# Patient Record
Sex: MALE | Race: WHITE | Employment: OTHER | URBAN - METROPOLITAN AREA
[De-identification: names, ages, dates, MRNs, and addresses within clinical notes are randomized per-mention and may not be internally consistent; named-entity substitution may affect disease eponyms.]

---

## 2023-01-09 ENCOUNTER — HOSPITAL ENCOUNTER (OUTPATIENT)
Age: 83
Setting detail: SPECIMEN
Discharge: HOME | End: 2023-01-09

## 2023-01-09 LAB
ALBUMIN SERPL-MCNC: 2.8 GM/DL (ref 3.4–5)
ALP BLD-CCNC: 138 IU/L (ref 40–129)
ALT SERPL-CCNC: 89 U/L (ref 10–40)
ANION GAP SERPL CALCULATED.3IONS-SCNC: 16 MMOL/L (ref 4–16)
AST SERPL-CCNC: 52 IU/L (ref 15–37)
BILIRUB SERPL-MCNC: 0.7 MG/DL (ref 0–1)
BILIRUBIN DIRECT: 0.2 MG/DL (ref 0–0.3)
BILIRUBIN, INDIRECT: 0.5 MG/DL (ref 0–0.7)
BUN BLDV-MCNC: 31 MG/DL (ref 6–23)
CALCIUM SERPL-MCNC: 8.7 MG/DL (ref 8.3–10.6)
CHLORIDE BLD-SCNC: 98 MMOL/L (ref 99–110)
CHOLESTEROL: 87 MG/DL
CO2: 20 MMOL/L (ref 21–32)
CREAT SERPL-MCNC: 1 MG/DL (ref 0.9–1.3)
ESTIMATED AVERAGE GLUCOSE: 146 MG/DL
GFR SERPL CREATININE-BSD FRML MDRD: >60 ML/MIN/1.73M2
GLUCOSE BLD-MCNC: 82 MG/DL (ref 70–99)
HBA1C MFR BLD: 6.7 % (ref 4.2–6.3)
HCT VFR BLD CALC: 41.3 % (ref 42–52)
HDLC SERPL-MCNC: 40 MG/DL
HEMOGLOBIN: 13 GM/DL (ref 13.5–18)
LDL CHOLESTEROL CALCULATED: 34 MG/DL
MCH RBC QN AUTO: 31.3 PG (ref 27–31)
MCHC RBC AUTO-ENTMCNC: 31.5 % (ref 32–36)
MCV RBC AUTO: 99.3 FL (ref 78–100)
PDW BLD-RTO: 13.7 % (ref 11.7–14.9)
PLATELET # BLD: 176 K/CU MM (ref 140–440)
PMV BLD AUTO: 10.1 FL (ref 7.5–11.1)
POTASSIUM SERPL-SCNC: 4.8 MMOL/L (ref 3.5–5.1)
RBC # BLD: 4.16 M/CU MM (ref 4.6–6.2)
SODIUM BLD-SCNC: 134 MMOL/L (ref 135–145)
TOTAL PROTEIN: 5.6 GM/DL (ref 6.4–8.2)
TRIGL SERPL-MCNC: 66 MG/DL
WBC # BLD: 4.7 K/CU MM (ref 4–10.5)

## 2023-01-09 PROCEDURE — 80061 LIPID PANEL: CPT

## 2023-01-09 PROCEDURE — 82248 BILIRUBIN DIRECT: CPT

## 2023-01-09 PROCEDURE — 83036 HEMOGLOBIN GLYCOSYLATED A1C: CPT

## 2023-01-09 PROCEDURE — 36415 COLL VENOUS BLD VENIPUNCTURE: CPT

## 2023-01-09 PROCEDURE — 80053 COMPREHEN METABOLIC PANEL: CPT

## 2023-01-09 PROCEDURE — 85027 COMPLETE CBC AUTOMATED: CPT

## 2023-01-11 ENCOUNTER — HOSPITAL ENCOUNTER (OUTPATIENT)
Age: 83
Setting detail: SPECIMEN
Discharge: HOME | End: 2023-01-11

## 2023-01-11 LAB
ALBUMIN SERPL-MCNC: 3.2 GM/DL (ref 3.4–5)
ALP BLD-CCNC: 137 IU/L (ref 40–129)
ALT SERPL-CCNC: 72 U/L (ref 10–40)
AST SERPL-CCNC: 39 IU/L (ref 15–37)
BILIRUB SERPL-MCNC: 0.7 MG/DL (ref 0–1)
BILIRUBIN DIRECT: 0.2 MG/DL (ref 0–0.3)
BILIRUBIN, INDIRECT: 0.5 MG/DL (ref 0–0.7)
GAMMA GLUTAMYL TRANSFERASE: 39 IU/L (ref 8–61)
TOTAL PROTEIN: 5.7 GM/DL (ref 6.4–8.2)

## 2023-01-11 PROCEDURE — 36415 COLL VENOUS BLD VENIPUNCTURE: CPT

## 2023-01-11 PROCEDURE — 80076 HEPATIC FUNCTION PANEL: CPT

## 2023-01-11 PROCEDURE — 82977 ASSAY OF GGT: CPT

## 2023-01-19 ENCOUNTER — HOSPITAL ENCOUNTER (OUTPATIENT)
Age: 83
Setting detail: SPECIMEN
Discharge: HOME | End: 2023-01-19
Payer: MEDICARE

## 2023-01-19 LAB
ALBUMIN SERPL-MCNC: 3.3 GM/DL (ref 3.4–5)
ALP BLD-CCNC: 124 IU/L (ref 40–129)
ALT SERPL-CCNC: 44 U/L (ref 10–40)
AST SERPL-CCNC: 36 IU/L (ref 15–37)
BILIRUB SERPL-MCNC: 0.6 MG/DL (ref 0–1)
BILIRUBIN DIRECT: 0.2 MG/DL (ref 0–0.3)
BILIRUBIN, INDIRECT: 0.4 MG/DL (ref 0–0.7)
TOTAL PROTEIN: 5.9 GM/DL (ref 6.4–8.2)

## 2023-01-19 PROCEDURE — 36415 COLL VENOUS BLD VENIPUNCTURE: CPT

## 2023-01-19 PROCEDURE — 80076 HEPATIC FUNCTION PANEL: CPT

## 2023-01-27 ENCOUNTER — APPOINTMENT (OUTPATIENT)
Dept: CT IMAGING | Age: 83
DRG: 689 | End: 2023-01-27
Payer: MEDICARE

## 2023-01-27 ENCOUNTER — APPOINTMENT (OUTPATIENT)
Dept: GENERAL RADIOLOGY | Age: 83
DRG: 689 | End: 2023-01-27
Payer: MEDICARE

## 2023-01-27 ENCOUNTER — HOSPITAL ENCOUNTER (INPATIENT)
Age: 83
LOS: 6 days | Discharge: NURSING, REHAB OR LONG-TERM CARE | DRG: 689 | End: 2023-02-02
Attending: INTERNAL MEDICINE
Payer: MEDICARE

## 2023-01-27 DIAGNOSIS — R53.1 GENERALIZED WEAKNESS: ICD-10-CM

## 2023-01-27 DIAGNOSIS — R31.9 URINARY TRACT INFECTION WITH HEMATURIA, SITE UNSPECIFIED: Primary | ICD-10-CM

## 2023-01-27 DIAGNOSIS — R33.9 URINARY RETENTION: ICD-10-CM

## 2023-01-27 DIAGNOSIS — N39.0 URINARY TRACT INFECTION WITH HEMATURIA, SITE UNSPECIFIED: Primary | ICD-10-CM

## 2023-01-27 DIAGNOSIS — S09.90XA CLOSED HEAD INJURY, INITIAL ENCOUNTER: ICD-10-CM
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
## 2023-01-27 LAB
ALBUMIN SERPL-MCNC: 3.4 GM/DL (ref 3.4–5)
ALP BLD-CCNC: 114 IU/L (ref 40–129)
ALT SERPL-CCNC: 39 U/L (ref 10–40)
AMMONIA: 25 UMOL/L (ref 16–60)
ANION GAP SERPL CALCULATED.3IONS-SCNC: 11 MMOL/L (ref 4–16)
AST SERPL-CCNC: 35 IU/L (ref 15–37)
BACTERIA: ABNORMAL /HPF
BASOPHILS ABSOLUTE: 0 K/CU MM
BASOPHILS RELATIVE PERCENT: 0.4 % (ref 0–1)
BILIRUB SERPL-MCNC: 0.6 MG/DL (ref 0–1)
BILIRUBIN URINE: NEGATIVE MG/DL
BLOOD, URINE: ABNORMAL
BUN BLDV-MCNC: 81 MG/DL (ref 6–23)
CALCIUM SERPL-MCNC: 9.1 MG/DL (ref 8.3–10.6)
CHLORIDE BLD-SCNC: 102 MMOL/L (ref 99–110)
CLARITY: CLEAR
CO2: 25 MMOL/L (ref 21–32)
COLOR: YELLOW
CREAT SERPL-MCNC: 1.4 MG/DL (ref 0.9–1.3)
DIFFERENTIAL TYPE: ABNORMAL
EKG ATRIAL RATE: 66 BPM
EKG DIAGNOSIS: NORMAL
EKG P AXIS: 74 DEGREES
EKG P-R INTERVAL: 228 MS
EKG Q-T INTERVAL: 494 MS
EKG QRS DURATION: 180 MS
EKG QTC CALCULATION (BAZETT): 517 MS
EKG R AXIS: -61 DEGREES
EKG T AXIS: 54 DEGREES
EKG VENTRICULAR RATE: 66 BPM
EOSINOPHILS ABSOLUTE: 0.2 K/CU MM
EOSINOPHILS RELATIVE PERCENT: 2.9 % (ref 0–3)
FOLATE: >20 NG/ML (ref 3.1–17.5)
GFR SERPL CREATININE-BSD FRML MDRD: 50 ML/MIN/1.73M2
GLUCOSE BLD-MCNC: 141 MG/DL
GLUCOSE BLD-MCNC: 141 MG/DL (ref 70–99)
GLUCOSE BLD-MCNC: 142 MG/DL (ref 70–99)
GLUCOSE, URINE: NEGATIVE MG/DL
HCT VFR BLD CALC: 41.6 % (ref 42–52)
HEMOGLOBIN: 13.3 GM/DL (ref 13.5–18)
IMMATURE NEUTROPHIL %: 0.7 % (ref 0–0.43)
KETONES, URINE: NEGATIVE MG/DL
LEUKOCYTE ESTERASE, URINE: ABNORMAL
LYMPHOCYTES ABSOLUTE: 1.2 K/CU MM
LYMPHOCYTES RELATIVE PERCENT: 17.8 % (ref 24–44)
MCH RBC QN AUTO: 31.4 PG (ref 27–31)
MCHC RBC AUTO-ENTMCNC: 32 % (ref 32–36)
MCV RBC AUTO: 98.3 FL (ref 78–100)
MONOCYTES ABSOLUTE: 0.9 K/CU MM
MONOCYTES RELATIVE PERCENT: 12.3 % (ref 0–4)
NITRITE URINE, QUANTITATIVE: POSITIVE
NUCLEATED RBC %: 0 %
PDW BLD-RTO: 15.1 % (ref 11.7–14.9)
PH, URINE: 7 (ref 5–8)
PLATELET # BLD: 156 K/CU MM (ref 140–440)
PMV BLD AUTO: 10.2 FL (ref 7.5–11.1)
POTASSIUM SERPL-SCNC: 4.5 MMOL/L (ref 3.5–5.1)
PROTEIN UA: NEGATIVE MG/DL
RBC # BLD: 4.23 M/CU MM (ref 4.6–6.2)
RBC URINE: 6 /HPF (ref 0–3)
SEGMENTED NEUTROPHILS ABSOLUTE COUNT: 4.6 K/CU MM
SEGMENTED NEUTROPHILS RELATIVE PERCENT: 65.9 % (ref 36–66)
SODIUM BLD-SCNC: 138 MMOL/L (ref 135–145)
SPECIFIC GRAVITY UA: <1.005 (ref 1–1.03)
TOTAL CK: 36 IU/L (ref 38–174)
TOTAL IMMATURE NEUTOROPHIL: 0.05 K/CU MM
TOTAL NUCLEATED RBC: 0 K/CU MM
TOTAL PROTEIN: 6.3 GM/DL (ref 6.4–8.2)
TRICHOMONAS: ABNORMAL /HPF
TROPONIN T: <0.01 NG/ML
TSH HIGH SENSITIVITY: 0.56 UIU/ML (ref 0.27–4.2)
UROBILINOGEN, URINE: 0.2 MG/DL (ref 0.2–1)
VITAMIN B-12: 1235 PG/ML (ref 211–911)
WBC # BLD: 6.9 K/CU MM (ref 4–10.5)
WBC CLUMP: ABNORMAL /HPF
WBC UA: 9 /HPF (ref 0–2)

## 2023-01-27 PROCEDURE — 6360000002 HC RX W HCPCS: Performed by: PHYSICIAN ASSISTANT

## 2023-01-27 PROCEDURE — 2060000000 HC ICU INTERMEDIATE R&B

## 2023-01-27 PROCEDURE — 6370000000 HC RX 637 (ALT 250 FOR IP): Performed by: NURSE PRACTITIONER

## 2023-01-27 PROCEDURE — 93005 ELECTROCARDIOGRAM TRACING: CPT | Performed by: PHYSICIAN ASSISTANT

## 2023-01-27 PROCEDURE — 84484 ASSAY OF TROPONIN QUANT: CPT

## 2023-01-27 PROCEDURE — 72125 CT NECK SPINE W/O DYE: CPT

## 2023-01-27 PROCEDURE — 51702 INSERT TEMP BLADDER CATH: CPT

## 2023-01-27 PROCEDURE — 82607 VITAMIN B-12: CPT

## 2023-01-27 PROCEDURE — 2580000003 HC RX 258: Performed by: PHYSICIAN ASSISTANT

## 2023-01-27 PROCEDURE — 87040 BLOOD CULTURE FOR BACTERIA: CPT

## 2023-01-27 PROCEDURE — 81003 URINALYSIS AUTO W/O SCOPE: CPT

## 2023-01-27 PROCEDURE — 82962 GLUCOSE BLOOD TEST: CPT

## 2023-01-27 PROCEDURE — 74176 CT ABD & PELVIS W/O CONTRAST: CPT

## 2023-01-27 PROCEDURE — 82746 ASSAY OF FOLIC ACID SERUM: CPT

## 2023-01-27 PROCEDURE — 72170 X-RAY EXAM OF PELVIS: CPT

## 2023-01-27 PROCEDURE — 99285 EMERGENCY DEPT VISIT HI MDM: CPT

## 2023-01-27 PROCEDURE — 96365 THER/PROPH/DIAG IV INF INIT: CPT

## 2023-01-27 PROCEDURE — 96361 HYDRATE IV INFUSION ADD-ON: CPT

## 2023-01-27 PROCEDURE — 80053 COMPREHEN METABOLIC PANEL: CPT

## 2023-01-27 PROCEDURE — 93010 ELECTROCARDIOGRAM REPORT: CPT | Performed by: INTERNAL MEDICINE

## 2023-01-27 PROCEDURE — 51798 US URINE CAPACITY MEASURE: CPT

## 2023-01-27 PROCEDURE — 85025 COMPLETE CBC W/AUTO DIFF WBC: CPT

## 2023-01-27 PROCEDURE — 71045 X-RAY EXAM CHEST 1 VIEW: CPT

## 2023-01-27 PROCEDURE — 36415 COLL VENOUS BLD VENIPUNCTURE: CPT

## 2023-01-27 PROCEDURE — 82550 ASSAY OF CK (CPK): CPT

## 2023-01-27 PROCEDURE — 82140 ASSAY OF AMMONIA: CPT

## 2023-01-27 PROCEDURE — 70450 CT HEAD/BRAIN W/O DYE: CPT

## 2023-01-27 PROCEDURE — 2580000003 HC RX 258: Performed by: NURSE PRACTITIONER

## 2023-01-27 PROCEDURE — 84443 ASSAY THYROID STIM HORMONE: CPT

## 2023-01-27 RX ORDER — ATORVASTATIN CALCIUM 40 MG/1
40 TABLET, FILM COATED ORAL NIGHTLY
COMMUNITY

## 2023-01-27 RX ORDER — NITROGLYCERIN 0.4 MG/1
0.4 TABLET SUBLINGUAL EVERY 5 MIN PRN
COMMUNITY

## 2023-01-27 RX ORDER — DEXTROSE MONOHYDRATE 100 MG/ML
INJECTION, SOLUTION INTRAVENOUS CONTINUOUS PRN
Status: DISCONTINUED | OUTPATIENT
Start: 2023-01-27 | End: 2023-02-02 | Stop reason: HOSPADM

## 2023-01-27 RX ORDER — DOCUSATE SODIUM 100 MG/1
100 CAPSULE, LIQUID FILLED ORAL DAILY
Status: DISCONTINUED | OUTPATIENT
Start: 2023-01-27 | End: 2023-01-28

## 2023-01-27 RX ORDER — UREA 15 G
30 POWDER IN PACKET (EA) ORAL 2 TIMES DAILY
Status: ON HOLD | COMMUNITY
End: 2023-02-02 | Stop reason: HOSPADM

## 2023-01-27 RX ORDER — BACITRACIN, NEOMYCIN, POLYMYXIN B 400; 3.5; 5 [USP'U]/G; MG/G; [USP'U]/G
OINTMENT TOPICAL 2 TIMES DAILY
Status: DISCONTINUED | OUTPATIENT
Start: 2023-01-27 | End: 2023-02-02 | Stop reason: HOSPADM

## 2023-01-27 RX ORDER — SODIUM CHLORIDE 9 MG/ML
INJECTION, SOLUTION INTRAVENOUS CONTINUOUS
Status: DISCONTINUED | OUTPATIENT
Start: 2023-01-27 | End: 2023-01-28

## 2023-01-27 RX ORDER — POLYETHYLENE GLYCOL 3350 17 G/17G
17 POWDER, FOR SOLUTION ORAL 2 TIMES DAILY PRN
COMMUNITY

## 2023-01-27 RX ORDER — ATORVASTATIN CALCIUM 40 MG/1
40 TABLET, FILM COATED ORAL NIGHTLY
Status: DISCONTINUED | OUTPATIENT
Start: 2023-01-27 | End: 2023-02-02 | Stop reason: HOSPADM

## 2023-01-27 RX ORDER — 0.9 % SODIUM CHLORIDE 0.9 %
1000 INTRAVENOUS SOLUTION INTRAVENOUS ONCE
Status: COMPLETED | OUTPATIENT
Start: 2023-01-27 | End: 2023-01-27

## 2023-01-27 RX ORDER — ONDANSETRON 2 MG/ML
4 INJECTION INTRAMUSCULAR; INTRAVENOUS EVERY 6 HOURS PRN
Status: DISCONTINUED | OUTPATIENT
Start: 2023-01-27 | End: 2023-02-02 | Stop reason: HOSPADM

## 2023-01-27 RX ORDER — LISINOPRIL 2.5 MG/1
2.5 TABLET ORAL NIGHTLY
Status: ON HOLD | COMMUNITY
End: 2023-02-02 | Stop reason: HOSPADM

## 2023-01-27 RX ORDER — ACETAMINOPHEN 650 MG/1
650 SUPPOSITORY RECTAL EVERY 6 HOURS PRN
Status: DISCONTINUED | OUTPATIENT
Start: 2023-01-27 | End: 2023-02-02 | Stop reason: HOSPADM

## 2023-01-27 RX ORDER — OMEPRAZOLE 20 MG/1
20 CAPSULE, DELAYED RELEASE ORAL DAILY
COMMUNITY

## 2023-01-27 RX ORDER — SODIUM CHLORIDE 0.9 % (FLUSH) 0.9 %
5-40 SYRINGE (ML) INJECTION EVERY 12 HOURS SCHEDULED
Status: DISCONTINUED | OUTPATIENT
Start: 2023-01-27 | End: 2023-02-02 | Stop reason: HOSPADM

## 2023-01-27 RX ORDER — SODIUM CHLORIDE 0.9 % (FLUSH) 0.9 %
5-40 SYRINGE (ML) INJECTION PRN
Status: DISCONTINUED | OUTPATIENT
Start: 2023-01-27 | End: 2023-02-02 | Stop reason: HOSPADM

## 2023-01-27 RX ORDER — TRAZODONE HYDROCHLORIDE 50 MG/1
50 TABLET ORAL DAILY
Status: ON HOLD | COMMUNITY
End: 2023-02-02 | Stop reason: HOSPADM

## 2023-01-27 RX ORDER — VITAMIN B COMPLEX
1 CAPSULE ORAL DAILY
COMMUNITY

## 2023-01-27 RX ORDER — PANTOPRAZOLE SODIUM 40 MG/1
40 TABLET, DELAYED RELEASE ORAL
Status: DISCONTINUED | OUTPATIENT
Start: 2023-01-28 | End: 2023-02-02 | Stop reason: HOSPADM

## 2023-01-27 RX ORDER — MIRTAZAPINE 7.5 MG/1
7.5 TABLET, FILM COATED ORAL NIGHTLY
Status: ON HOLD | COMMUNITY
End: 2023-02-02 | Stop reason: HOSPADM

## 2023-01-27 RX ORDER — DOCUSATE SODIUM 100 MG/1
100 CAPSULE, LIQUID FILLED ORAL DAILY
COMMUNITY

## 2023-01-27 RX ORDER — POLYETHYLENE GLYCOL 3350 17 G/17G
17 POWDER, FOR SOLUTION ORAL DAILY PRN
Status: DISCONTINUED | OUTPATIENT
Start: 2023-01-27 | End: 2023-02-02 | Stop reason: HOSPADM

## 2023-01-27 RX ORDER — MIRTAZAPINE 15 MG/1
7.5 TABLET, FILM COATED ORAL NIGHTLY
Status: DISCONTINUED | OUTPATIENT
Start: 2023-01-27 | End: 2023-02-02 | Stop reason: HOSPADM

## 2023-01-27 RX ORDER — CHLORAL HYDRATE 500 MG
1000 CAPSULE ORAL DAILY
COMMUNITY

## 2023-01-27 RX ORDER — SODIUM CHLORIDE 9 MG/ML
INJECTION, SOLUTION INTRAVENOUS PRN
Status: DISCONTINUED | OUTPATIENT
Start: 2023-01-27 | End: 2023-02-02 | Stop reason: HOSPADM

## 2023-01-27 RX ORDER — ONDANSETRON 4 MG/1
4 TABLET, ORALLY DISINTEGRATING ORAL EVERY 8 HOURS PRN
Status: DISCONTINUED | OUTPATIENT
Start: 2023-01-27 | End: 2023-02-02 | Stop reason: HOSPADM

## 2023-01-27 RX ORDER — TAMSULOSIN HYDROCHLORIDE 0.4 MG/1
0.4 CAPSULE ORAL NIGHTLY
COMMUNITY

## 2023-01-27 RX ORDER — TRAZODONE HYDROCHLORIDE 50 MG/1
50 TABLET ORAL DAILY
Status: DISCONTINUED | OUTPATIENT
Start: 2023-01-27 | End: 2023-02-02 | Stop reason: HOSPADM

## 2023-01-27 RX ORDER — TAMSULOSIN HYDROCHLORIDE 0.4 MG/1
0.4 CAPSULE ORAL NIGHTLY
Status: DISCONTINUED | OUTPATIENT
Start: 2023-01-27 | End: 2023-02-02 | Stop reason: HOSPADM

## 2023-01-27 RX ORDER — ACETAMINOPHEN 325 MG/1
650 TABLET ORAL EVERY 6 HOURS PRN
Status: DISCONTINUED | OUTPATIENT
Start: 2023-01-27 | End: 2023-02-02 | Stop reason: HOSPADM

## 2023-01-27 RX ADMIN — MIRTAZAPINE 7.5 MG: 15 TABLET, FILM COATED ORAL at 21:15

## 2023-01-27 RX ADMIN — TRAZODONE HYDROCHLORIDE 50 MG: 50 TABLET ORAL at 21:15

## 2023-01-27 RX ADMIN — SODIUM CHLORIDE: 9 INJECTION, SOLUTION INTRAVENOUS at 21:23

## 2023-01-27 RX ADMIN — DOCUSATE SODIUM 100 MG: 100 CAPSULE, LIQUID FILLED ORAL at 21:15

## 2023-01-27 RX ADMIN — SODIUM CHLORIDE 1000 ML: 9 INJECTION, SOLUTION INTRAVENOUS at 11:20

## 2023-01-27 RX ADMIN — TAMSULOSIN HYDROCHLORIDE 0.4 MG: 0.4 CAPSULE ORAL at 21:15

## 2023-01-27 RX ADMIN — CEFTRIAXONE SODIUM 1000 MG: 1 INJECTION, POWDER, FOR SOLUTION INTRAMUSCULAR; INTRAVENOUS at 12:10

## 2023-01-27 RX ADMIN — ATORVASTATIN CALCIUM 40 MG: 40 TABLET, FILM COATED ORAL at 21:15

## 2023-01-27 RX ADMIN — Medication 10 ML: at 21:00

## 2023-01-27 NOTE — ED NOTES
Medication History  [facility]    Patient Name: Viki Barbour 1940     Medication history has been completed by: Bernard Merritt CPhT    Source(s) of information: [facility] provided by [facility]     Primary Care Physician: Kia Zuniga:     Allergies as of 01/27/2023    (No Known Allergies)        Prior to Admission medications    Medication Sig Start Date End Date Taking?  Authorizing Provider   Probiotic Product (ACIDOPHILUS PEARLS PO) Take 1 capsule by mouth daily   Yes Historical Provider, MD   atorvastatin (LIPITOR) 40 MG tablet Take 40 mg by mouth nightly   Yes Historical Provider, MD   docusate sodium (COLACE) 100 MG capsule Take 100 mg by mouth daily   Yes Historical Provider, MD   apixaban (ELIQUIS) 5 MG TABS tablet Take 5 mg by mouth 2 times daily   Yes Historical Provider, MD   Multiple Vitamins-Minerals (EYE MULTIVITAMIN/SODIUM PO) Take 1 tablet by mouth 2 times daily   Yes Historical Provider, MD   Omega-3 Fatty Acids (FISH OIL) 1000 MG capsule Take 1,000 mg by mouth daily   Yes Historical Provider, MD   lisinopril (PRINIVIL;ZESTRIL) 2.5 MG tablet Take 2.5 mg by mouth nightly   Yes Historical Provider, MD   metFORMIN (GLUCOPHAGE) 500 MG tablet Take 500 mg by mouth 2 times daily (with meals)   Yes Historical Provider, MD   mirtazapine (REMERON) 7.5 MG tablet Take 7.5 mg by mouth nightly   Yes Historical Provider, MD   omeprazole (PRILOSEC) 20 MG delayed release capsule Take 20 mg by mouth daily   Yes Historical Provider, MD   Calcium Carb-Cholecalciferol (OYSTER SHELL CALCIUM + D PO) Take 1 tablet by mouth daily Receives 500/200 mg tablets   Yes Historical Provider, MD   tamsulosin (FLOMAX) 0.4 MG capsule Take 0.4 mg by mouth nightly   Yes Historical Provider, MD   traZODone (DESYREL) 50 MG tablet Take 50 mg by mouth daily   Yes Historical Provider, MD   urea (URE-NA) 15 g PACK packet Take 30 g by mouth 2 times daily   Yes Historical Provider, MD b complex vitamins capsule Take 1 capsule by mouth daily   Yes Historical Provider, MD   nitroGLYCERIN (NITROSTAT) 0.4 MG SL tablet Place 0.4 mg under the tongue every 5 minutes as needed for Chest pain up to max of 3 total doses. If no relief after 1 dose, call 911. Yes Historical Provider, MD   polyethylene glycol (GLYCOLAX) 17 g packet Take 17 g by mouth 2 times daily as needed for Constipation   Yes Historical Provider, MD     Medications added or changed (ex.  new medication, dosage change, interval change, formulation change):  See medication list as stated above    Comments:  [facility]     To my knowledge the above medication history is accurate as of 1/27/2023 1:17 PM.   Nabila Kline CPhT   1/27/2023 1:17 PM

## 2023-01-27 NOTE — ED NOTES
Patient wanted lower dentures out. Patient took them out and I placed them in a denture cup with lid. Placed denture cup on patient's beside table.

## 2023-01-27 NOTE — ED NOTES
Pt tried to provide urine sample but states he has a hard time using urinals. Pt was straight cathed and tolerated well.       Marcial Arciniega, RN  01/27/23 8226

## 2023-01-27 NOTE — H&P
History and Physical      Name:  Lou Jones /Age/Sex: 1940  (80 y.o. male)   MRN & CSN:  [id number] & [id number] Admission Date/Time: 2023  8:37 AM   Location:  ED27/ED-27 PCP: Enrike Ni Day: 1           Assessment and Plan:   Lou Jones is a 80 y.o. male  with history of CAD status post CABG, CVA, SAH, persistent atrial fibrillation status post cardioversion on AC, essential hypertension, diabetes mellitus, BEBETO on CPAP, former tobacco abuse who presents from [facility] for confusion and a fall. # Acute encephalopathy in setting of UTI/Hx Dementia -CT head nonacute. Patient appears to be at his baseline mental status able to state name place and president, reorients to a year otherwise no acute focal deficits on exam.  -Continue neurochecks overnight, will check TSH folate B12, ammonia, continue treatment of UTI with IV Ceftriaxone, recent history of Klebsiella UTI which was sensitive to cephalosporins, f/u urine and bcx's and adj abx per sensitivity. # Hypotension -improving, possibly due to fluid volume status,systolic BP 15X initially, improved to low 100s after 1 L IV fluids given in ED. IV hydration, hold current antihypertensives until BP more stable    # Acute kidney injury with acute urinary retention/hx BPH -urinary retention and hypotension contributing to JOSH. Patient was unable to void in ED, straight cath was unsuccessful, bladder scan 600 mL Wall cath placement in ED.   -We will hold patient's ACE inhibitor and metformin. Avoid nephrotoxins, NSAIDs, IV contrast studies, renal dose medications for current GFR, continue IV hydration x1 L monitor need for further IV hydration, keep MAP greater than 65 we will continue patient's Flomax, recommend voiding trial in 1 to 2 days, possibly will need urology eval.     # Head abrasion s/p recent fall -patient had mechanical fall last evening at the [facility] living community.   The patient apparently rolled out of bed. CT head cervical spine abdomen pelvis were nonacute performed in ED today.   -local wound care, fall precautions, PT/OT    # CAD status post CABG 2012-patient denies chest pain. EKG showing RBBBTroponin negative x1. Patient managed on statin and ACE inhibitor not on antiplatelets or beta-blocker, monitor on telemetry    # Persistent atrial fibrillation status post cardioversion on [facility] -patient managed on Eliquis, not on rate control medications, heart rate currently controlled, telemetry monitoring    Other chronic medical conditions resume home medications unless contraindicated   # Hx CVA/SAH -reportedly no residual deficits. On statin  # Essential hypertension-holding antihypertensives until BP more stable and renal function stable  # Diabetes mellitus type 2-hold metformin, monitor blood glucose before meals and at bedtime, manage with corrective insulin, monitor need for basal insulin, ADA diet  # BEBETO on CPAP-resume CPAP per home settings  # Mixed hyperlipidemia on statin  # Hx  Dementia - managed on Remeron  # Chronic anemia - Hgb stable, monitor    Present on Admission:   Urinary tract infection             Diet No diet orders on file   DVT Prophylaxis [] Lovenox, []  Heparin, [] SCDs, [] Ambulation  [x] Long term AC   GI Prophylaxis [x] PPI,  [] H2 Blocker,  [] Carafate,  [] Diet/Tube Feeds   Code Status Full code    Disposition Admit to .     Patient plans to return home upon discharge  Expected length of stay 2 days       -Patient assessment and plan discussed and reviewed with admitting physician: Karyna Fernandez MD.       Chief Complaint: Fall      History obtained from EHR and patient and Masonic Supervisor   History of Present Illness:   Shereen Ryder is a 80 y.o. male  with history of CAD status post CABG, CVA, SAH, persistent atrial fibrillation status post cardioversion on AC, essential hypertension, diabetes mellitus, BEBETO on CPAP, former tobacco abuse who presents from [address] assisted living for confusion and a fall. The patient had been in his usual state of health until last evening he had a reported mechanical fall sustaining a head abrasion. This morning he was noted to have increased confusion from his baseline, low blood pressure and difficulty ambulating therefore he was sent to ED for evaluation. Per nursing supervisor no reports of recent infections fever chills or cough. He had been eating and drinking well. The patient denies chest pain or shortness of breath. He reports he had some lower abdominal discomfort which improved once Wall cath was placed in ED. He presented to the emergency department afebrile pulse 67 respiration 16 blood pressure 92/64 O2 sat 97% on room air. He had systolic BP in the 30W which improved with 1 L of normal saline given in ED. CT of the head cervical spine performed was nonacute. CT of the abdomen pelvis showed no acute pathology. He he was unable to void in ED and was bladder scanned for greater than 600 mL, therefore a Wall cath was placed. Urinalysis was positive for UTI. Chemistry panel showed a BUN 81 creatinine 1.4 GFR 50 glucose 142 otherwise unremarkable. CK 36. EKG showed sinus rhythm with right bundle branch block, troponin negative x1. LFTs unremarkable. WBC 6.9, hemoglobin 13.3, hematocrit 41.6, platelets 136. The patient was given IV ceftriaxone blood cultures x2 obtained. He has been admitted for further management. Ten point ROS: reviewed and are negative, unless as noted in above HPI. Objective:   No intake or output data in the 24 hours ending 01/27/23 1605     Vitals:   Vitals:    01/27/23 1326 01/27/23 1329 01/27/23 1529 01/27/23 1532   BP: (!) 100/54 (!) 100/54  106/64   Pulse: 68 66 62 61   Resp: 25 21 23 21   Temp:       TempSrc:       SpO2: 97% 96% 100% 94%   Weight:       Height:           Physical Exam: 01/27/23     GEN -Awake  appearing male, in NAD.   Appears given age.  EYES -anicteric, conjunctiva pink. HENT -right forehead abrasion . MM are moist. No evidence of thrush. NECK -Supple, no apparent thyromegaly or masses. RESP -CTA, no wheezes, rales or rhonchi. Symmetric chest movement   C/V -S1/S2 auscultated. RRR without appreciable M/R/G. No JVD. Cap refill <3 sec. trace edema. GI -Abdomen is soft non distended, non tender to palpation. + BS. No masses or guarding.  -No CVA/ flank tenderness. Wall cath with liberty urine present  LYMPH- No petechiae or ecchymoses. MS -No gross joint deformities. SKIN -scattered petechiae noted in the lower extremities normal coloration, warm, dry. NEURO-Cranial nerves appear grossly intact, normal speech, no lateralizing weakness. PSYC-Awake, alert, oriented to self and place reorients to time  . Appropriate affect. Past Medical History:      Past Medical History:   Diagnosis Date    Atrial fibrillation ([facility] [address]     Cerebral artery occlusion with cerebral infarction ([facility] [address]     Diabetes mellitus ([facility] [address]     Hyperlipidemia     Hypertension    CAD, BPH  BEBETO on CPAP    PMH reviewed  Past Surgical  History:    has no past surgical history on file. CABG  Surgical history reviewed  Family  History:   family history is not on file. Father -, Mother -     Family history reviewed  Social History:     Social History     Socioeconomic History    Marital status:      Spouse name: None    Number of children: None    Years of education: None    Highest education level: None   Tobacco Use    Smoking status: Former     Types: Cigarettes    Smokeless tobacco: Former   Substance and Sexual Activity    Alcohol use: Not Currently    Drug use: Never    Sexual activity: Not Currently      reports that he has quit smoking. His smoking use included cigarettes. He has quit using smokeless tobacco.   reports that he does not currently use alcohol. reports no history of drug use.     Social history reviewed  Allergies:   No Known Allergies    Home Medications:     Prior to Admission medications    Medication Sig Start Date End Date Taking? Authorizing Provider   Probiotic Product (ACIDOPHILUS PEARLS PO) Take 1 capsule by mouth daily   Yes Historical Provider, MD   atorvastatin (LIPITOR) 40 MG tablet Take 40 mg by mouth nightly   Yes Historical Provider, MD   docusate sodium (COLACE) 100 MG capsule Take 100 mg by mouth daily   Yes Historical Provider, MD   apixaban (ELIQUIS) 5 MG TABS tablet Take 5 mg by mouth 2 times daily   Yes Historical Provider, MD   Multiple Vitamins-Minerals (EYE MULTIVITAMIN/SODIUM PO) Take 1 tablet by mouth 2 times daily   Yes Historical Provider, MD   Omega-3 Fatty Acids (FISH OIL) 1000 MG capsule Take 1,000 mg by mouth daily   Yes Historical Provider, MD   lisinopril (PRINIVIL;ZESTRIL) 2.5 MG tablet Take 2.5 mg by mouth nightly   Yes Historical Provider, MD   metFORMIN (GLUCOPHAGE) 500 MG tablet Take 500 mg by mouth 2 times daily (with meals)   Yes Historical Provider, MD   mirtazapine (REMERON) 7.5 MG tablet Take 7.5 mg by mouth nightly   Yes Historical Provider, MD   omeprazole (PRILOSEC) 20 MG delayed release capsule Take 20 mg by mouth daily   Yes Historical Provider, MD   Calcium Carb-Cholecalciferol (OYSTER SHELL CALCIUM + D PO) Take 1 tablet by mouth daily Receives 500/200 mg tablets   Yes Historical Provider, MD   tamsulosin (FLOMAX) 0.4 MG capsule Take 0.4 mg by mouth nightly   Yes Historical Provider, MD   traZODone (DESYREL) 50 MG tablet Take 50 mg by mouth daily   Yes Historical Provider, MD   urea (URE-NA) 15 g PACK packet Take 30 g by mouth 2 times daily   Yes Historical Provider, MD   b complex vitamins capsule Take 1 capsule by mouth daily   Yes Historical Provider, MD   nitroGLYCERIN (NITROSTAT) 0.4 MG SL tablet Place 0.4 mg under the tongue every 5 minutes as needed for Chest pain up to max of 3 total doses. If no relief after 1 dose, call 911.    Yes Historical Provider, MD   polyethylene glycol (GLYCOLAX) 17 g packet Take 17 g by mouth 2 times daily as needed for Constipation   Yes Historical Provider, MD         Medications:   Medications:    Infusions:   PRN Meds:     Data:     Laboratory this visit:  Reviewed  Recent Labs     01/27/23  0847   WBC 6.9   HGB 13.3*   HCT 41.6*         Recent Labs     01/27/23  0847      K 4.5      CO2 25   BUN 81*   CREATININE 1.4*     Recent Labs     01/27/23  0847   AST 35   ALT 39   BILITOT 0.6   ALKPHOS 114     No results for input(s): INR in the last 72 hours. Radiology this visit:  Reviewed. CT ABDOMEN PELVIS WO CONTRAST Additional Contrast? None    Result Date: 1/27/2023  EXAMINATION: CT OF THE ABDOMEN AND PELVIS WITHOUT CONTRAST 1/27/2023 1:58 pm TECHNIQUE: CT of the abdomen and pelvis was performed without the administration of intravenous contrast. Multiplanar reformatted images are provided for review. Automated exposure control, iterative reconstruction, and/or weight based adjustment of the mA/kV was utilized to reduce the radiation dose to as low as reasonably achievable. COMPARISON: None. HISTORY: ORDERING SYSTEM PROVIDED HISTORY: JOSH, UTI, confusion; fell of SNF bed last night TECHNOLOGIST PROVIDED HISTORY: Reason for exam:->JOSH, UTI, confusion; fell of SNF bed last night Additional Contrast?->None Decision Support Exception - unselect if not a suspected or confirmed emergency medical condition->Emergency Medical Condition (MA) Reason for Exam: JOSH, UTI, confusion; fell of SNF bed last night Relevant Medical/Surgical History: none FINDINGS: Lower Chest: Lung bases are clear. Coronary artery disease is appreciated. Organs: The solid organs are limited in evaluation due to lack of use of intravenous contrast. Cholelithiasis is identified. Multiple bilateral renal cysts are identified more numerous and larger on the right. No hydronephrosis is identified. GI/Bowel: The bowel is normal in caliber. No free air is identified. The appendix is normal.  Mild diverticulosis is identified without evidence of acute diverticulitis. Pelvis: The urinary bladder is intact. The prostate gland is enlarged and measures 6.5 cm in width. Peritoneum/Retroperitoneum: No lymphadenopathy is appreciated. Moderate aortic atherosclerosis is identified. Bones/Soft Tissues: Right total hip arthroplasty appears uncomplicated. Osteopenia is noted. No acute osseous abnormality is identified. No acute abnormality within the abdomen or pelvis. Numerous bilateral renal cysts without evidence for hydronephrosis. Osteopenia. No gross acute fracture. XR PELVIS (1-2 VIEWS)    Result Date: 1/27/2023  EXAMINATION: ONE XRAY VIEW OF THE PELVIS 1/27/2023 9:17 am COMPARISON: None. HISTORY: ORDERING SYSTEM PROVIDED HISTORY: fell out of bed; remote h/o of right hip fracture TECHNOLOGIST PROVIDED HISTORY: Reason for exam:->fell out of bed; remote h/o of right hip fracture Reason for Exam: fell out of bed; remote h/o of right hip fracture FINDINGS: The bones are osteopenic. There is a right hip arthroplasty. No acute fractures or dislocations are seen. There is no diastases of the pubic symphysis or sacroiliac joints. There are degenerative changes involving the left hip. 1. No acute abnormality involving the pelvis. CT HEAD WO CONTRAST    Result Date: 1/27/2023  EXAMINATION: CT OF THE HEAD WITHOUT CONTRAST; CT OF THE CERVICAL SPINE WITHOUT CONTRAST 1/27/2023 9:29 am TECHNIQUE: CT of the head was performed without the administration of intravenous contrast. Automated exposure control, iterative reconstruction, and/or weight based adjustment of the mA/kV was utilized to reduce the radiation dose to as low as reasonably achievable.; CT of the cervical spine was performed without the administration of intravenous contrast. Multiplanar reformatted images are provided for review.  Automated exposure control, iterative reconstruction, and/or weight based adjustment of the mA/kV was utilized to reduce the radiation dose to as low as reasonably achievable. COMPARISON: None. HISTORY: ORDERING SYSTEM PROVIDED HISTORY: fell out of bed; head injury/scalp hematoma; possible ams, generalized  weakness TECHNOLOGIST PROVIDED HISTORY: Reason for exam:->fell out of bed; head injury/scalp hematoma; possible ams, generalized  weakness Has a \"code stroke\" or \"stroke alert\" been called? ->No Decision Support Exception - unselect if not a suspected or confirmed emergency medical condition->Emergency Medical Condition (MA) Reason for Exam: fell out of bed; head injury/scalp hematoma; possible ams, generalized  weakness Additional signs and symptoms: unknown Relevant Medical/Surgical History: poor historian FINDINGS: CT SCAN HEAD: BRAIN/VENTRICLES: There is no acute intracranial hemorrhage, mass effect or midline shift. No abnormal extra-axial fluid collection. The gray-white differentiation is maintained without evidence of an acute infarct. There is no evidence of hydrocephalus. The cerebral sulci and ventricles are enlarged compatible with diffuse cerebral atrophy. There is low-attenuation in the periventricular white matter and deep white matter of the brain representing changes of chronic small vessel ischemic disease. ORBITS: The visualized portion of the orbits demonstrate no acute abnormality. SINUSES: The visualized paranasal sinuses and mastoid air cells demonstrate no acute abnormality. There is mucosal disease involving the maxillary and ethmoid sinuses. SOFT TISSUES/SKULL:  No acute abnormality involving the visualized skull or soft tissues. CT SCAN CERVICAL SPINE: BONES/ALIGNMENT: The alignment of the cervical spine is within normal limits. No acute fractures or dislocations are seen. DEGENERATIVE CHANGES: There is multilevel degenerative disease of the cervical spine. SOFT TISSUES: There is no prevertebral soft tissue swelling.      1. No acute intracranial abnormality. 2. No acute traumatic abnormality involving the cervical spine. CT CERVICAL SPINE WO CONTRAST    Result Date: 1/27/2023  EXAMINATION: CT OF THE HEAD WITHOUT CONTRAST; CT OF THE CERVICAL SPINE WITHOUT CONTRAST 1/27/2023 9:29 am TECHNIQUE: CT of the head was performed without the administration of intravenous contrast. Automated exposure control, iterative reconstruction, and/or weight based adjustment of the mA/kV was utilized to reduce the radiation dose to as low as reasonably achievable.; CT of the cervical spine was performed without the administration of intravenous contrast. Multiplanar reformatted images are provided for review. Automated exposure control, iterative reconstruction, and/or weight based adjustment of the mA/kV was utilized to reduce the radiation dose to as low as reasonably achievable. COMPARISON: None. HISTORY: ORDERING SYSTEM PROVIDED HISTORY: fell out of bed; head injury/scalp hematoma; possible ams, generalized  weakness TECHNOLOGIST PROVIDED HISTORY: Reason for exam:->fell out of bed; head injury/scalp hematoma; possible ams, generalized  weakness Has a \"code stroke\" or \"stroke alert\" been called? ->No Decision Support Exception - unselect if not a suspected or confirmed emergency medical condition->Emergency Medical Condition (MA) Reason for Exam: fell out of bed; head injury/scalp hematoma; possible ams, generalized  weakness Additional signs and symptoms: unknown Relevant Medical/Surgical History: poor historian FINDINGS: CT SCAN HEAD: BRAIN/VENTRICLES: There is no acute intracranial hemorrhage, mass effect or midline shift. No abnormal extra-axial fluid collection. The gray-white differentiation is maintained without evidence of an acute infarct. There is no evidence of hydrocephalus. The cerebral sulci and ventricles are enlarged compatible with diffuse cerebral atrophy.  There is low-attenuation in the periventricular white matter and deep white matter of the brain representing changes of chronic small vessel ischemic disease. ORBITS: The visualized portion of the orbits demonstrate no acute abnormality. SINUSES: The visualized paranasal sinuses and mastoid air cells demonstrate no acute abnormality. There is mucosal disease involving the maxillary and ethmoid sinuses. SOFT TISSUES/SKULL:  No acute abnormality involving the visualized skull or soft tissues. CT SCAN CERVICAL SPINE: BONES/ALIGNMENT: The alignment of the cervical spine is within normal limits. No acute fractures or dislocations are seen. DEGENERATIVE CHANGES: There is multilevel degenerative disease of the cervical spine. SOFT TISSUES: There is no prevertebral soft tissue swelling. 1. No acute intracranial abnormality. 2. No acute traumatic abnormality involving the cervical spine. XR CHEST PORTABLE    Result Date: 1/27/2023  EXAMINATION: ONE XRAY VIEW OF THE CHEST 1/27/2023 9:17 am COMPARISON: None. HISTORY: ORDERING SYSTEM PROVIDED HISTORY: fell out of bed last night; confusion; TECHNOLOGIST PROVIDED HISTORY: Reason for exam:->fell out of bed last night; confusion; Reason for Exam: fell out of bed last night; confusion; FINDINGS: There are postsurgical changes of median sternotomy. The heart size and pulmonary vasculature are within normal limits. No acute infiltrates are seen. There are calcified lymph nodes and granulomas. There is elevation of the right hemidiaphragm. There are degenerative changes involving the shoulders. 1. Elevation of the right hemidiaphragm of uncertain etiology. Otherwise, no active pulmonary disease.            EKG this visit:  personally reviewed         Electronically signed by KALI Delgado CNP on 1/27/2023 at 4:05 PM

## 2023-01-27 NOTE — ED NOTES
ED TO INPATIENT SBAR HANDOFF    Patient Name: Robin Kebede   :  1940  80 y.o. MRN:  [id number]  Preferred Name    ED Room #:  ED27/ED-27  Family/Caregiver Present no   Restraints no   Sitter no   Sepsis Risk Score Sepsis Risk Score: 2.25    Situation  Code Status: Full Code No additional code details. Allergies: Patient has no known allergies. Weight: Patient Vitals for the past 96 hrs (Last 3 readings):   Weight   23 0844 260 lb (117.9 kg)     Arrived from: nursing home  Chief Complaint:   Chief Complaint   Patient presents with    [facility] Problem/Diagnosis:  Principal Problem:    Urinary tract infection  Resolved Problems:    * No resolved hospital problems. *    Imaging:   CT ABDOMEN PELVIS WO CONTRAST Additional Contrast? None   Final Result   No acute abnormality within the abdomen or pelvis. Numerous bilateral renal cysts without evidence for hydronephrosis. Osteopenia. No gross acute fracture. CT CERVICAL SPINE WO CONTRAST   Final Result   1. No acute intracranial abnormality. 2. No acute traumatic abnormality involving the cervical spine. CT HEAD WO CONTRAST   Final Result   1. No acute intracranial abnormality. 2. No acute traumatic abnormality involving the cervical spine. XR PELVIS (1-2 VIEWS)   Final Result   1. No acute abnormality involving the pelvis. XR CHEST PORTABLE   Final Result   1. Elevation of the right hemidiaphragm of uncertain etiology. Otherwise, no   active pulmonary disease.            Abnormal labs:   Abnormal Labs Reviewed   CK - Abnormal; Notable for the following components:       Result Value    Total CK 36 (*)     All other components within normal limits   CBC WITH AUTO DIFFERENTIAL - Abnormal; Notable for the following components:    RBC 4.23 (*)     Hemoglobin 13.3 (*)     Hematocrit 41.6 (*)     MCH 31.4 (*)     RDW 15.1 (*)     Lymphocytes % 17.8 (*)     Monocytes % 12.3 (*)     Immature Neutrophil % 0.7 (*)     All other components within normal limits   COMPREHENSIVE METABOLIC PANEL - Abnormal; Notable for the following components:    BUN 81 (*)     Creatinine 1.4 (*)     Est, Glom Filt Rate 50 (*)     Glucose 142 (*)     Total Protein 6.3 (*)     All other components within normal limits   URINALYSIS WITH REFLEX TO CULTURE - Abnormal; Notable for the following components:    Blood, Urine MODERATE NUMBER OR AMOUNT OBSERVED (*)     Nitrite Urine, Quantitative POSITIVE (*)     Leukocyte Esterase, Urine SMALL NUMBER OR AMOUNT OBSERVED (*)     RBC, UA 6 (*)     WBC, UA 9 (*)     Bacteria, UA OCCASIONAL (*)     All other components within normal limits   POCT GLUCOSE - Abnormal; Notable for the following components:    POC Glucose 141 (*)     All other components within normal limits     Critical values: no     Abnormal Assessment Findings: Laceration on head     Background  History:   Past Medical History:   Diagnosis Date    Atrial fibrillation (HCC)     Cerebral artery occlusion with cerebral infarction ([facility] [address]     Diabetes mellitus ([facility]     Hyperlipidemia     Hypertension        Assessment    Vitals/MEWS: MEWS Score: 1  Level of Consciousness: Alert (0)   Vitals:    01/27/23 1803 01/27/23 1804 01/27/23 1809 01/27/23 1818   BP: 101/68   101/68   Pulse: 82 84 85 86   Resp: 18 17 18 20   Temp:  98.8 °F (37.1 °C)  100.1 °F (37.8 °C)   TempSrc:    Oral   SpO2:    93%   Weight:       Height:         FiO2 (%):   O2 Flow Rate:      Cardiac Rhythm: NSR  Pain Assessment:  [] Verbal [] Artelia Jake Scale  Pain Scale:    Last documented pain score (0-10 scale)    Last documented pain medication administered:   Mental Status: disoriented  NIH Score: NIH     C-SSRS: Risk of Suicide: No Risk  Bedside swallow:    Tere Coma Scale (GCS): Tere Coma Scale  Eye Opening: Spontaneous  Best Verbal Response: Oriented  Best Motor Response: Obeys commands  Tere Coma Scale Score: 15  Active LDA's:   Peripheral IV 01/27/23 Left Forearm (Active)   Site Assessment Clean, dry & intact 01/27/23 0859   Line Status Blood return noted;Specimen collected 01/27/23 0859     PO Status: Regular  Pertinent or High Risk Medications/Drips: no   If Yes, please provide details:   Pending Blood Product Administration: no     You may also review the ED PT Care Timeline found under the Summary Nursing Index tab. Recommendation    Pending orders   Plan for Discharge (if known):    Additional Comments:    If any further questions, please call Sending RN at [phone]    Electronically signed by: Electronically signed by Luciano Fisher RN on 1/27/2023 at 6:19 PM      Marques Valdivia RN  01/27/23 4896

## 2023-01-27 NOTE — ED NOTES
Pt attempted to urinated 3 times without success, bladder scan revealed 608 ml of retained urine. Pt had vance placed per Tahira QURESHI/ pt tolerated well. Large amount of blood/clots in urine.       Reshma Goode RN  01/27/23 0024

## 2023-01-27 NOTE — ED NOTES
ED TO INPATIENT SBAR HANDOFF    Patient Name: Fatuma Simon   :  1940  80 y.o. MRN:  [id number]  Preferred Name  Judi Solorzano  ED Room #:  ED27/ED-27  Family/Caregiver Present no   Restraints no   Sitter no   Sepsis Risk Score Sepsis Risk Score: 2.25    Situation  Code Status: Full Code No additional code details. Allergies: Patient has no known allergies. Weight: Patient Vitals for the past 96 hrs (Last 3 readings):   Weight   23 0844 260 lb (117.9 kg)     Arrived from: nursing home  Chief Complaint:   Chief Complaint   Patient presents with   [facility] Problem/Diagnosis:  Principal Problem:    Urinary tract infection  Resolved Problems:    * No resolved hospital problems. *    Imaging:   CT ABDOMEN PELVIS WO CONTRAST Additional Contrast? None   Final Result   No acute abnormality within the abdomen or pelvis. Numerous bilateral renal cysts without evidence for hydronephrosis. Osteopenia. No gross acute fracture. CT CERVICAL SPINE WO CONTRAST   Final Result   1. No acute intracranial abnormality. 2. No acute traumatic abnormality involving the cervical spine. CT HEAD WO CONTRAST   Final Result   1. No acute intracranial abnormality. 2. No acute traumatic abnormality involving the cervical spine. XR PELVIS (1-2 VIEWS)   Final Result   1. No acute abnormality involving the pelvis. XR CHEST PORTABLE   Final Result   1. Elevation of the right hemidiaphragm of uncertain etiology. Otherwise, no   active pulmonary disease.            Abnormal labs:   Abnormal Labs Reviewed   CK - Abnormal; Notable for the following components:       Result Value    Total CK 36 (*)     All other components within normal limits   CBC WITH AUTO DIFFERENTIAL - Abnormal; Notable for the following components:    RBC 4.23 (*)     Hemoglobin 13.3 (*)     Hematocrit 41.6 (*)     MCH 31.4 (*)     RDW 15.1 (*)     Lymphocytes % 17.8 (*)     Monocytes % 12.3 (*)     Immature Neutrophil % 0.7 (*)     All other components within normal limits   COMPREHENSIVE METABOLIC PANEL - Abnormal; Notable for the following components:    BUN 81 (*)     Creatinine 1.4 (*)     Est, Glom Filt Rate 50 (*)     Glucose 142 (*)     Total Protein 6.3 (*)     All other components within normal limits   URINALYSIS WITH REFLEX TO CULTURE - Abnormal; Notable for the following components:    Blood, Urine MODERATE NUMBER OR AMOUNT OBSERVED (*)     Nitrite Urine, Quantitative POSITIVE (*)     Leukocyte Esterase, Urine SMALL NUMBER OR AMOUNT OBSERVED (*)     RBC, UA 6 (*)     WBC, UA 9 (*)     Bacteria, UA OCCASIONAL (*)     All other components within normal limits   POCT GLUCOSE - Abnormal; Notable for the following components:    POC Glucose 141 (*)     All other components within normal limits     Critical values: no     Abnormal Assessment Findings: pt has abrasion to right side of forehead and bruising to right upper thigh/buttcheek     Background  History:   Past Medical History:   Diagnosis Date    Atrial fibrillation (HCC)     [facility] ([facility]     Diabetes mellitus ([facility]     Hyperlipidemia     Hypertension        Assessment    Vitals/MEWS:        Vitals:    01/27/23 1702 01/27/23 1803 01/27/23 1804 01/27/23 1809   BP: (!) 140/82 101/68     Pulse: 79 82 84 85   Resp: 15 18 17 18   Temp:   98.8 °F (37.1 °C)    TempSrc:       SpO2:       Weight:       Height:         FiO2 (%): room air  O2 Flow Rate:      Cardiac Rhythm: NSR  Pain Assessment: 4 [x] Verbal [] Donzell Peasant Scale  Pain Scale:    Last documented pain score (0-10 scale)    Last documented pain medication administered:   Mental Status: oriented and alert  NIH Score: NIH     C-SSRS: Risk of Suicide: No Risk  Bedside swallow:    Tere Coma Scale (GCS): Tere Coma Scale  Eye Opening: Spontaneous  Best Verbal Response: Oriented  Best Motor Response: Obeys commands  Celina Coma Scale Score: 15  Active LDA's:   Peripheral IV 01/27/23 Left Forearm (Active)   Site Assessment Clean, dry & intact 01/27/23 0859   Line Status Blood return noted;Specimen collected 01/27/23 0859     PO Status: NPO  Pertinent or High Risk Medications/Drips: no   o If Yes, please provide details:   o   Pending Blood Product Administration: no     You may also review the ED PT Care Timeline found under the Summary Nursing Index tab. Recommendation    Pending orders   Plan for Discharge (if known): Additional Comments: Pt is alert and oriented to self/situation. Pt does have memory issues according to daughter and takes some frequent reminders. Pt has not pulled at vance/IV since in ED. Pt is able to stand up with assistance for short periods of time. Daughter is first contact and in chart.  Pt has had low BP since arrival  If any further questions, please call Sending RN at [phone]    Electronically signed by: Electronically signed by Terrance Mac RN on 1/27/2023 at 6:18 PM     Terrance Mac RN  01/27/23 0426

## 2023-01-27 NOTE — ED PROVIDER NOTES
EKG: Sinus rhythm with first-degree AV block, bifascicular block; prior bundle branch block/LAFB  Ventricular rate of 66  No STEMI  Similar to previous EKG from [facility]  EKG interpreted by me pending cardiology report.      [address] [facility],[address],   01/27/23 5587

## 2023-01-27 NOTE — ED TRIAGE NOTES
Pt presents to ED from [facility] home for a fall last night out  of bed according to staff, unknown if there was a LOC. Pt has dressing on right side of forehead with laceration.  Pt is on blood thinners, pt is A&O x3

## 2023-01-27 NOTE — ED PROVIDER NOTES
[address] [facility] ICU STEPDOWN  EMERGENCY DEPARTMENT ENCOUNTER        Pt Name: Bard Pereira  MRN: [id number]  [place] 1940  Date of evaluation: 1/27/2023  Provider: Erica Parsons PA-C  PCP: Giovanna ONEIL. I have evaluated this patient. My supervising physician was available for consultation. CHIEF COMPLAINT       Chief Complaint   Patient presents with    Fall       HISTORY OF PRESENT ILLNESS: 1 or more Elements     History from : Patient, EMS, and SNF transfer Report    Limitations to history : possible AMS     Bard Pereira is a 80 y.o. male who presents via EMS from [address] [place] [address] living [facility]. Reportedly patient fell out of bed last night, the anticoagulation for history of A. fib. Transfer report mentioning patient has since been having low blood pressure readings, and increased confusion. EMS reports that patient has been alert and oriented for him, and no reported prolonged downtime, loss of consciousness, nausea vomiting, or speech changes or focal neurodeficits. Nursing Notes were all reviewed and agreed with or any disagreements were addressed in the HPI. REVIEW OF SYSTEMS :      Review of Systems   Constitutional:  Negative for chills and fever. Cardiovascular:  Negative for chest pain. Gastrointestinal:  Positive for abdominal pain. Genitourinary:  Positive for difficulty urinating. Neurological:  Negative for syncope, speech difficulty and headaches. Psychiatric/Behavioral:  Positive for confusion. Positives and Pertinent negatives as per HPI. SURGICAL HISTORY   History reviewed. No pertinent surgical history.     [address]       Current Discharge Medication List        CONTINUE these medications which have NOT CHANGED    Details   Probiotic Product (ACIDOPHILUS PEARLS PO) Take 1 capsule by mouth daily      atorvastatin (LIPITOR) 40 MG tablet Take 40 mg by mouth nightly      docusate sodium (COLACE) 100 MG capsule Take 100 mg by mouth daily      apixaban (ELIQUIS) 5 MG TABS tablet Take 5 mg by mouth 2 times daily      Multiple Vitamins-Minerals (EYE MULTIVITAMIN/SODIUM PO) Take 1 tablet by mouth 2 times daily      Omega-3 Fatty Acids (FISH OIL) 1000 MG capsule Take 1,000 mg by mouth daily      lisinopril (PRINIVIL;ZESTRIL) 2.5 MG tablet Take 2.5 mg by mouth nightly      metFORMIN (GLUCOPHAGE) 500 MG tablet Take 500 mg by mouth 2 times daily (with meals)      mirtazapine (REMERON) 7.5 MG tablet Take 7.5 mg by mouth nightly      omeprazole (PRILOSEC) 20 MG delayed release capsule Take 20 mg by mouth daily      Calcium Carb-Cholecalciferol (OYSTER SHELL CALCIUM + D PO) Take 1 tablet by mouth daily Receives 500/200 mg tablets      tamsulosin (FLOMAX) 0.4 MG capsule Take 0.4 mg by mouth nightly      traZODone (DESYREL) 50 MG tablet Take 50 mg by mouth daily      urea (URE-NA) 15 g PACK packet Take 30 g by mouth 2 times daily      b complex vitamins capsule Take 1 capsule by mouth daily      nitroGLYCERIN (NITROSTAT) 0.4 MG SL tablet Place 0.4 mg under the tongue every 5 minutes as needed for Chest pain up to max of 3 total doses. If no relief after 1 dose, call 911. polyethylene glycol (GLYCOLAX) 17 g packet Take 17 g by mouth 2 times daily as needed for Constipation             ALLERGIES     Patient has no known allergies. FAMILYHISTORY     History reviewed. No pertinent family history.      SOCIAL HISTORY       Social History     Tobacco Use    Smoking status: Former     Types: Cigarettes    Smokeless tobacco: Former   Substance Use Topics    Alcohol use: Not Currently    Drug use: Never       SCREENINGS        Tere Coma Scale  Eye Opening: Spontaneous  Best Verbal Response: Confused  Best Motor Response: Obeys commands  Tere Coma Scale Score: 14                CIWA Assessment  BP: 125/74  Heart Rate: 81               PHYSICAL EXAM  1 or more Elements     ED Triage Vitals   BP Temp Temp Source Heart Rate Resp SpO2 Height Weight   01/27/23 0830 01/27/23 0830 01/27/23 0830 01/27/23 0830 01/27/23 0830 -- 01/27/23 0844 01/27/23 0844   92/64 98.1 °F (36.7 °C) Oral 67 16  6' 1.5\" (1.867 m) 260 lb (117.9 kg)       Physical Exam  HENT:      Head: Normocephalic. Abrasion (right forehead) and contusion present. No raccoon eyes or Arrington's sign. Nose: No rhinorrhea. Cardiovascular:      Rate and Rhythm: Normal rate. Pulses: Normal pulses. Pulmonary:      Effort: No respiratory distress. Abdominal:      General: There is no distension. Tenderness: There is no right CVA tenderness, left CVA tenderness or guarding. Musculoskeletal:         General: No tenderness. Cervical back: No tenderness. Right lower leg: No edema. Left lower leg: No edema. Skin:     General: Skin is warm. Neurological:      Mental Status: He is alert. Mental status is at baseline. Psychiatric:         Mood and Affect: Mood normal.         Behavior: Behavior normal.       EMERGENCY DEPARTMENT COURSE and DIFFERENTIAL DIAGNOSIS/MDM:     Vitals:    01/28/23 0318 01/28/23 0839 01/28/23 1005 01/28/23 1109   BP: 109/84 105/71 102/65 125/74   Pulse: 72 79 78 81   Resp: 18 16 28 20   Temp: 97.7 °F (36.5 °C) 97.9 °F (36.6 °C)  97.7 °F (36.5 °C)   TempSrc: Oral Oral  Oral   SpO2: 94% 99%  98%   Weight:       Height:           Pt is a 80 y.o. male who presents with above HPI. Nursing notes and vital signs reviewed. Pt seen upon arrival to his room. Alert no distress. Answering my questions appropriately. He tells me he fell out of bed last night. Not sure why. He tells me he was able to walk to breakfast today. Patient is a 80-year-old male arrives via EMS from [address] [place] [address]. Reportedly patient fell out of bed last night, the anticoagulation for history of A. fib. Transfer report mentioning patient has since been having low blood pressure readings, and increased confusion.   EMS reports that patient has been alert and oriented for him, and no reported prolonged downtime, loss of consciousness, nausea vomiting, or speech changes or focal neurodeficits. Patient apparently has a history of frequent falls and some generalized weakness. Reviewing EMR, patient admitted to [facility] approximately 1 month ago for hyponatremia unsteady gait, frequent falls, and cellulitis of right elbow. He is evaluated by PT OT, who advised for admission. I saw patient upon his arrival to exam room. He is alert and oriented no acute distress, mild hard of hearing, but is answering questions appropriately. Noted scalp hematoma, no midline cervical tenderness. Lung sounds clear. No unilateral weakness. Emergent conditions considered. Work-up initiated.    @ 2:42 PM EST:  daughter at bedside providing more history. She mentions patient has been more weak lately, and confused over past few days. Additionally, patient has noticed some gross hematuria, and is having some mild suprapubic discomfort. Given concern for UTI, will plan for antibiotics, and given some generalized weakness, and intermittent confusion, plan for admission. @ 3:34 PM EST: Patient discussed with and accepted by hospitalist Onofre Henriquez NP    @ 3:54 PM EST:  Patient had been c/o of worsening suprapubic pain. With minimal urine output and gross hematuria. Bladderscan ordered.   Per RN Brian Hsieh, >600cc, vance placed           Patient was given thefollowing medications:  Medications   sodium chloride flush 0.9 % injection 5-40 mL (5 mLs IntraVENous Not Given 1/28/23 0916)   sodium chloride flush 0.9 % injection 5-40 mL (has no administration in time range)   0.9 % sodium chloride infusion (has no administration in time range)   ondansetron (ZOFRAN-ODT) disintegrating tablet 4 mg (has no administration in time range)     Or   ondansetron (ZOFRAN) injection 4 mg (has no administration in time range)   polyethylene glycol (GLYCOLAX) packet 17 g (has no administration in time range)   acetaminophen (TYLENOL) tablet 650 mg (has no administration in time range)     Or   acetaminophen (TYLENOL) suppository 650 mg (has no administration in time range)   cefTRIAXone (ROCEPHIN) 1,000 mg in sodium chloride 0.9 % 50 mL IVPB (mini-bag) (1,000 mg IntraVENous Not Given 1/27/23 1809)   apixaban (ELIQUIS) tablet 5 mg (5 mg Oral Not Given 1/28/23 0916)   atorvastatin (LIPITOR) tablet 40 mg (40 mg Oral Given 1/27/23 2115)   mirtazapine (REMERON) tablet 7.5 mg ( Oral Automatically Held 2/2/23 2100)   pantoprazole (PROTONIX) tablet 40 mg (40 mg Oral Given 1/28/23 0639)   tamsulosin (FLOMAX) capsule 0.4 mg (0.4 mg Oral Given 1/27/23 2115)   traZODone (DESYREL) tablet 50 mg (50 mg Oral Given 1/27/23 2115)   neomycin-bacitracin-polymyxin (NEOSPORIN) ointment ( Topical Given 1/28/23 1141)   glucose chewable tablet 16 g (has no administration in time range)   dextrose bolus 10% 125 mL (has no administration in time range)     Or   dextrose bolus 10% 250 mL (has no administration in time range)   glucagon (rDNA) injection 1 mg (has no administration in time range)   dextrose 10 % infusion (has no administration in time range)   docusate sodium (COLACE) capsule 100 mg (has no administration in time range)   0.9 % sodium chloride bolus (0 mLs IntraVENous Stopped 1/27/23 1529)   cefTRIAXone (ROCEPHIN) 1,000 mg in sodium chloride 0.9 % 50 mL IVPB (mini-bag) (0 mg IntraVENous Stopped 1/27/23 1326)             CONSULTS: (Who and What was discussed, see also below)  None        CC/HPI SUMMARY, DDX, ED COURSE, AND REASSESSMENT, MDM:     Patient with history as above presented with above history. History obtained from History from : Patient, EMS, and SNF/FCI , transfer report, and daughter  Patient was nontoxic, stable. Exam as above  EKG reviewed. On my interpretation, show afib , rate controlled. No st elevation or evidence of acute ischemia  Labs reviewed.    I independently reviewed imaging. However please refer to final radiologist's report for definitive impression and findings. CXR-  On my interpretation : no obvious consolidation  Reviewed external records. Differential diagnosis considered. Overall presentation is consistent with UTI, with h/o dementia, also possible concussion. Low suspicion for sepsis. patient has h/o afib, ekg show no acute changes. Normal troponin  Patient was treated with IV abx and fluids   Discussed case with hospitalist  who agreed to admit patient. DDx:dementia, tia, cva, ICH, SAH, closed head injury, encephalopathy, generalized weakness, hip fracture, concussion, renal colic/stone , seizure, acs,     Disposition Considerations (tests considered but not done, Admit vs D/C, Shared Decision Making, Pt Expectation of Test or Tx.): none (unless indicated in ED Course, Summary, Reassessment, or MDM     Patient to be admitted       Is this patient to be included in the SEP-1 Core Measure due to severe sepsis or septic shock? No   Exclusion criteria - the patient is NOT to be included for SEP-1 Core Measure due to:  2+ SIRS criteria are not met    Chronic Conditions affecting care:    has a past medical history of Atrial fibrillation ([facility] [address] Cerebral artery occlusion with cerebral infarction ([facility] [address] Diabetes mellitus ([facility] [address] Hyperlipidemia, and Hypertension. Records Reviewed : Care Everywhere    I am the Primary Clinician of Record.       DIAGNOSTIC RESULTS   LABS:    Labs Reviewed   CK - Abnormal; Notable for the following components:       Result Value    Total CK 36 (*)     All other components within normal limits   CBC WITH AUTO DIFFERENTIAL - Abnormal; Notable for the following components:    RBC 4.23 (*)     Hemoglobin 13.3 (*)     Hematocrit 41.6 (*)     MCH 31.4 (*)     RDW 15.1 (*)     Lymphocytes % 17.8 (*)     Monocytes % 12.3 (*)     Immature Neutrophil % 0.7 (*)     All other components within normal limits   COMPREHENSIVE METABOLIC PANEL - Abnormal; Notable for the following components:    BUN 81 (*)     Creatinine 1.4 (*)     Est, Glom Filt Rate 50 (*)     Glucose 142 (*)     Total Protein 6.3 (*)     All other components within normal limits   URINALYSIS WITH REFLEX TO CULTURE - Abnormal; Notable for the following components:    Blood, Urine MODERATE NUMBER OR AMOUNT OBSERVED (*)     Nitrite Urine, Quantitative POSITIVE (*)     Leukocyte Esterase, Urine SMALL NUMBER OR AMOUNT OBSERVED (*)     RBC, UA 6 (*)     WBC, UA 9 (*)     Bacteria, UA OCCASIONAL (*)     All other components within normal limits   VITAMIN B12 & FOLATE - Abnormal; Notable for the following components:    Vitamin B-12 1235 (*)     Folate >20.0 (*)     All other components within normal limits   BASIC METABOLIC PANEL W/ REFLEX TO MG FOR LOW K - Abnormal; Notable for the following components:    BUN 54 (*)     Glucose 107 (*)     All other components within normal limits   CBC WITH AUTO DIFFERENTIAL - Abnormal; Notable for the following components:    RBC 3.94 (*)     Hemoglobin 12.5 (*)     Hematocrit 38.5 (*)     MCH 31.7 (*)     RDW 15.2 (*)     Segs Relative 83.4 (*)     Lymphocytes % 8.7 (*)     Monocytes % 6.3 (*)     All other components within normal limits   VITAMIN B12 & FOLATE - Abnormal; Notable for the following components:    Vitamin B-12 1085 (*)     All other components within normal limits   POCT GLUCOSE - Abnormal; Notable for the following components:    POC Glucose 141 (*)     All other components within normal limits   POCT GLUCOSE - Normal   CULTURE, BLOOD 1   CULTURE, BLOOD 2   TROPONIN   TSH   AMMONIA   TSH       When ordered only abnormal lab results are displayed. All other labs were within normal range or not returned as of this dictation. EKG: When ordered, EKG's are interpreted by the Emergency Department Physician in the absence of a cardiologist.  Please see their note for interpretation of EKG.     RADIOLOGY:   Non-plain film images such as CT, Ultrasound and MRI are read by the radiologist. Plain radiographic images are visualized and preliminarily interpreted by the ED Provider with the below findings:      Interpretation per the Radiologist below, if available at the time of this note:    No results found. CT ABDOMEN PELVIS WO CONTRAST Additional Contrast? None   Final Result   No acute abnormality within the abdomen or pelvis. Numerous bilateral renal cysts without evidence for hydronephrosis. Osteopenia. No gross acute fracture. CT CERVICAL SPINE WO CONTRAST   Final Result   1. No acute intracranial abnormality. 2. No acute traumatic abnormality involving the cervical spine. CT HEAD WO CONTRAST   Final Result   1. No acute intracranial abnormality. 2. No acute traumatic abnormality involving the cervical spine. XR PELVIS (1-2 VIEWS)   Final Result   1. No acute abnormality involving the pelvis. XR CHEST PORTABLE   Final Result   1. Elevation of the right hemidiaphragm of uncertain etiology. Otherwise, no   active pulmonary disease. No results found. No results found. PROCEDURES   Unless otherwise noted below, none     Procedures    CRITICAL CARE TIME (.cctime)       PAST MEDICAL HISTORY      has a past medical history of Atrial fibrillation ([address] Cerebral artery occlusion with cerebral infarction ([address] Diabetes mellitus ([address] Hyperlipidemia, and Hypertension. FINAL IMPRESSION      1. Urinary tract infection with hematuria, site unspecified    2. Generalized weakness    3. Fall from bed, initial encounter    4. Closed head injury, initial encounter    5. Urinary retention          DISPOSITION/PLAN     DISPOSITION Admitted 01/27/2023 03:44:13 PM      PATIENT REFERRED TO:  No follow-up provider specified.     DISCHARGE MEDICATIONS:  Current Discharge Medication List          DISCONTINUED MEDICATIONS:  Current Discharge Medication List                 (Please note that portions of this note were completed with a voice recognition program.  Efforts were made to edit the dictations but occasionally words are mis-transcribed.)    Kristen Bain PA-C (electronically signed)       Kristen Bain PA-C  01/28/23 1978 Jr Cook PA-C  01/28/23 0971

## 2023-01-28 LAB
ANION GAP SERPL CALCULATED.3IONS-SCNC: 9 MMOL/L (ref 4–16)
BASOPHILS ABSOLUTE: 0 K/CU MM
BASOPHILS RELATIVE PERCENT: 0.3 % (ref 0–1)
BUN BLDV-MCNC: 54 MG/DL (ref 6–23)
CALCIUM SERPL-MCNC: 8.4 MG/DL (ref 8.3–10.6)
CHLORIDE BLD-SCNC: 106 MMOL/L (ref 99–110)
CO2: 25 MMOL/L (ref 21–32)
CREAT SERPL-MCNC: 1.2 MG/DL (ref 0.9–1.3)
DIFFERENTIAL TYPE: ABNORMAL
EOSINOPHILS ABSOLUTE: 0.1 K/CU MM
EOSINOPHILS RELATIVE PERCENT: 0.9 % (ref 0–3)
FOLATE: 9.1 NG/ML (ref 3.1–17.5)
GFR SERPL CREATININE-BSD FRML MDRD: >60 ML/MIN/1.73M2
GLUCOSE BLD-MCNC: 107 MG/DL (ref 70–99)
HCT VFR BLD CALC: 38.5 % (ref 42–52)
HEMOGLOBIN: 12.5 GM/DL (ref 13.5–18)
IMMATURE NEUTROPHIL %: 0.4 % (ref 0–0.43)
LYMPHOCYTES ABSOLUTE: 0.7 K/CU MM
LYMPHOCYTES RELATIVE PERCENT: 8.7 % (ref 24–44)
MCH RBC QN AUTO: 31.7 PG (ref 27–31)
MCHC RBC AUTO-ENTMCNC: 32.5 % (ref 32–36)
MCV RBC AUTO: 97.7 FL (ref 78–100)
MONOCYTES ABSOLUTE: 0.5 K/CU MM
MONOCYTES RELATIVE PERCENT: 6.3 % (ref 0–4)
NUCLEATED RBC %: 0 %
PDW BLD-RTO: 15.2 % (ref 11.7–14.9)
PLATELET # BLD: 143 K/CU MM (ref 140–440)
PMV BLD AUTO: 10.3 FL (ref 7.5–11.1)
POTASSIUM SERPL-SCNC: 4.4 MMOL/L (ref 3.5–5.1)
RBC # BLD: 3.94 M/CU MM (ref 4.6–6.2)
SEGMENTED NEUTROPHILS ABSOLUTE COUNT: 6.5 K/CU MM
SEGMENTED NEUTROPHILS RELATIVE PERCENT: 83.4 % (ref 36–66)
SODIUM BLD-SCNC: 140 MMOL/L (ref 135–145)
TOTAL IMMATURE NEUTOROPHIL: 0.03 K/CU MM
TOTAL NUCLEATED RBC: 0 K/CU MM
TSH HIGH SENSITIVITY: 0.36 UIU/ML (ref 0.27–4.2)
VITAMIN B-12: 1085 PG/ML (ref 211–911)
WBC # BLD: 7.8 K/CU MM (ref 4–10.5)

## 2023-01-28 PROCEDURE — 97166 OT EVAL MOD COMPLEX 45 MIN: CPT

## 2023-01-28 PROCEDURE — 6360000002 HC RX W HCPCS: Performed by: NURSE PRACTITIONER

## 2023-01-28 PROCEDURE — 2580000003 HC RX 258: Performed by: NURSE PRACTITIONER

## 2023-01-28 PROCEDURE — 97530 THERAPEUTIC ACTIVITIES: CPT

## 2023-01-28 PROCEDURE — 97163 PT EVAL HIGH COMPLEX 45 MIN: CPT

## 2023-01-28 PROCEDURE — 2060000000 HC ICU INTERMEDIATE R&B

## 2023-01-28 PROCEDURE — 97116 GAIT TRAINING THERAPY: CPT

## 2023-01-28 PROCEDURE — 94761 N-INVAS EAR/PLS OXIMETRY MLT: CPT

## 2023-01-28 PROCEDURE — 80048 BASIC METABOLIC PNL TOTAL CA: CPT

## 2023-01-28 PROCEDURE — 85025 COMPLETE CBC W/AUTO DIFF WBC: CPT

## 2023-01-28 PROCEDURE — 6370000000 HC RX 637 (ALT 250 FOR IP): Performed by: STUDENT IN AN ORGANIZED HEALTH CARE EDUCATION/TRAINING PROGRAM

## 2023-01-28 PROCEDURE — 6370000000 HC RX 637 (ALT 250 FOR IP): Performed by: NURSE PRACTITIONER

## 2023-01-28 RX ORDER — DOCUSATE SODIUM 100 MG/1
100 CAPSULE, LIQUID FILLED ORAL 2 TIMES DAILY PRN
Status: DISCONTINUED | OUTPATIENT
Start: 2023-01-28 | End: 2023-02-02 | Stop reason: HOSPADM

## 2023-01-28 RX ORDER — QUETIAPINE FUMARATE 25 MG/1
25 TABLET, FILM COATED ORAL ONCE
Status: COMPLETED | OUTPATIENT
Start: 2023-01-29 | End: 2023-01-28

## 2023-01-28 RX ADMIN — BACITRACIN, NEOMYCIN, POLYMYXIN B: 400; 3.5; 5 OINTMENT TOPICAL at 11:41

## 2023-01-28 RX ADMIN — APIXABAN 5 MG: 5 TABLET, FILM COATED ORAL at 19:48

## 2023-01-28 RX ADMIN — CEFTRIAXONE 1000 MG: 1 INJECTION, POWDER, FOR SOLUTION INTRAMUSCULAR; INTRAVENOUS at 18:02

## 2023-01-28 RX ADMIN — ATORVASTATIN CALCIUM 40 MG: 40 TABLET, FILM COATED ORAL at 19:48

## 2023-01-28 RX ADMIN — TAMSULOSIN HYDROCHLORIDE 0.4 MG: 0.4 CAPSULE ORAL at 19:48

## 2023-01-28 RX ADMIN — BACITRACIN, NEOMYCIN, POLYMYXIN B: 400; 3.5; 5 OINTMENT TOPICAL at 19:47

## 2023-01-28 RX ADMIN — TRAZODONE HYDROCHLORIDE 50 MG: 50 TABLET ORAL at 20:53

## 2023-01-28 RX ADMIN — QUETIAPINE FUMARATE 25 MG: 25 TABLET ORAL at 23:49

## 2023-01-28 RX ADMIN — SODIUM CHLORIDE: 9 INJECTION, SOLUTION INTRAVENOUS at 18:00

## 2023-01-28 RX ADMIN — Medication 10 ML: at 19:49

## 2023-01-28 RX ADMIN — PANTOPRAZOLE SODIUM 40 MG: 40 TABLET, DELAYED RELEASE ORAL at 06:39

## 2023-01-28 RX ADMIN — BACITRACIN, NEOMYCIN, POLYMYXIN B: 400; 3.5; 5 OINTMENT TOPICAL at 00:00

## 2023-01-28 ASSESSMENT — ENCOUNTER SYMPTOMS: ABDOMINAL PAIN: 1

## 2023-01-28 NOTE — PLAN OF CARE
Problem: Discharge Planning  Goal: Discharge to home or other facility with appropriate resources  Outcome: Progressing     Problem: Pain  Goal: Verbalizes/displays adequate comfort level or baseline comfort level  Outcome: Progressing     Problem: Confusion  Goal: Confusion, delirium, dementia, or psychosis is improved or at baseline  Description: INTERVENTIONS:  1. Assess for possible contributors to thought disturbance, including medications, impaired vision or hearing, underlying metabolic abnormalities, dehydration, psychiatric diagnoses, and notify attending LIP  2. [place] high risk fall precautions, as indicated  3. Provide frequent short contacts to provide reality reorientation, refocusing and direction  4. Decrease environmental stimuli, including noise as appropriate  5. Monitor and intervene to maintain adequate nutrition, hydration, elimination, sleep and activity  6. If unable to ensure safety without constant attention obtain sitter and review sitter guidelines with assigned personnel  7. Initiate Psychosocial CNS and Spiritual Care consult, as indicated  Outcome: Progressing     Problem: Skin/Tissue Integrity  Goal: Absence of new skin breakdown  Description: 1. Monitor for areas of redness and/or skin breakdown  2. Assess vascular access sites hourly  3. Every 4-6 hours minimum:  Change oxygen saturation probe site  4. Every 4-6 hours:  If on nasal continuous positive airway pressure, respiratory therapy assess nares and determine need for appliance change or resting period.   Outcome: Progressing     Problem: ABCDS Injury Assessment  Goal: Absence of physical injury  Outcome: Progressing     Problem: Safety - Adult  Goal: Free from fall injury  Outcome: Progressing  Sherry Yin RN

## 2023-01-28 NOTE — PROGRESS NOTES
Occupational Therapy  [facility] ACUTE CARE OCCUPATIONAL THERAPY EVALUATION    Fatuma Simon, 1940, 2016/2016-A, 1/28/2023    Discharge Recommendation: Shayne Garza      History:  [place]:  The primary encounter diagnosis was Urinary tract infection with hematuria, site unspecified. Diagnoses of Generalized weakness, Fall from bed, initial encounter, Closed head injury, initial encounter, and Urinary retention were also pertinent to this visit.   Past Medical History:   Diagnosis Date    Atrial fibrillation ([facility] [address]     Cerebral artery occlusion with cerebral infarction ([facility] [address]     Diabetes mellitus ([facility] [address]     Hyperlipidemia     Hypertension        Subjective:  Patient states: \"I love fishing until I can't catch fish\"  Pain:  denies  Communication with other providers: co-eval w/ PT, handoff to RN  Restrictions: General Precautions, Fall Risk    Home Setup/Prior level of function:  Social/Functional History  Lives With: Alone  Type of Home: Assisted living  Bathroom Shower/Tub: Walk-in shower  Bathroom Toilet: Handicap height  Home Equipment: Alert Button, Rollator  Has the patient had two or more falls in the past year or any fall with injury in the past year?: Yes  ADL Assistance: Needs assistance  Homemaking Responsibilities: No  Ambulation Assistance: Independent  Transfer Assistance: Independent     Examination:  Observation: Seated EOB upon arrival, agreeable to therapy eval.  Vision: WFL  Hearing: WFL  Vitals: Stable vitals throughout session on room air      [address] and functions:  ROM: WFL   Strength: B UE grossly 4/5 across all major joints   Sensation: WFL  Tone: Normal  Coordination: WFL  Perception: WNL      Cognitive and Psychosocial Functioning:  Overall cognitive status: alert, oriented to person only  Affect: Normal   Arousal/Alertness Appropriate responses to stimuli   Following Commands Follows one step commands with repetition   Attention Span Attends with cues to redirect   Memory Decreased short term memory; Decreased long term memory   Safety Judgement Decreased awareness of need for safety; Decreased awareness of need for assistance   Problem Solving Decreased awareness of errors   Insights Not aware of deficits   Initiation Requires cues for some   Sequencing Requires cues for some       Functional Mobility:  Bed mobility:  NT  Sitting balance:  SBA    Transfers: STS to/from EOB and to/from recliner w/ CGA  Standing balance:  CGA static, CGA-min A dynamic  Functional Mobility: ambulated functional household distance using RW w/ CGA, LOB x2 during turning requiring mod A to correct. Vcs for sequencing/safety and RW mgmt + pathway negotiation  Toilet/Shower Transfers: STS to/from standard toilet w/ CGA        Activities of Daily Living (ADLs):  Feeding: set up A  Grooming: CGA  UB bathing: min A  LB bathing: mod A  UB dressing: min A  LB dressing: mod A  Toileting: min A    *ADL determined per observation of functional mobility, balance, activity tolerance, cognition, or actual ADL performance. AM-PAC 6 click short form for inpatient daily activity:   How much help from another person does the patient currently need. .. Unable  Dep A Lot  Max A A Lot   Mod A A Little  Min A A Little   CGA  SBA None   Mod I  Indep  Sup   1. Putting on and taking off regular lower body clothing? [] 1    [] 2   [x] 2   [] 3   [] 3   [] 4      2. Bathing (including washing, rinsing, drying)? [] 1   [] 2   [x] 2 [] 3 [] 3 [] 4   3. Toileting, which includes using toilet, bedpan, or urinal? [] 1    [] 2   [] 2   [x] 3   [] 3   [] 4     4. Putting on and taking off regular upper body clothing? [] 1   [] 2   [] 2   [x] 3   [] 3    [] 4      5. Taking care of personal grooming such as brushing teeth? [] 1   [] 2    [] 2 [] 3    [x] 3   [] 4      6. Eating meals?    [] 1   [] 2   [] 2   [] 3   [] 3   [x] 4        Raw Score:  17     [24=0% impaired(CH), 23=1-19%(CI), 20-22=20-39%(CJ), 15-19=40-59%(CK), 10-14=60-79%(CL), 7-9=80-99%(CM), 6=100%(CN)]     Treatment:    Therapeutic Activity Training:   Therapeutic activity training was instructed today. Cues were given for safety, sequence, UE/LE placement, awareness, and balance. Activities performed today included bed mobility training, sup-sit, sit-stand, ambulation. Educated pt on role of OT, therapy POC and functional goals, progression w/ ADLs and transfers, importance of movement and OOB activity, d/c recommendations     Safety Measures: Gait belt used, Left in recliner, Alarm in place  Recommendations for NURSING activity:  Up to chair for all 3 meals and up to bathroom for all toileting needs     Assessment:  Pt is an 80 y o M admitted d/t UTI. Pt at baseline has assistance for ADLs, assistance for high level IADLs, and mod I for functional transfers/mobility w/ rollator. Pt currently presents w/ deficits in ADL and high level IADL independence, functional ADL transfers, strength, and functional activity tolerance. Continued OT services recommended to increase safety and independence with ADL routine and to address remaining functional deficits. Pt would benefit from continued acute care OT services w/ discharge to SNF. Complexity: Moderate  Prognosis: Good, no significant barriers to participation at this time. Occupational Therapy Plan  Times Per Week: 3+  Current Treatment Recommendations: Strengthening, ROM, Functional mobility training, Cognitive reorientation, Safety education & training, Pain management, Patient/Caregiver education & training, Self-Care / ADL, Equipment evaluation, education, & procurement, Cognitive/Perceptual training         Goals:  Pt will complete all aspects of bed mobility for EOB/OOB ADLs w/ mod I. Pt will complete UB ADLs w/ supervision. Pt will complete LB ADLs w/ min A. Pt will complete all functional transfers to and from bed, chair, toilet, shower chair w/ supervision.   Pt will ambulate functional household distance w/ supervision. Pt will complete all aspects of toileting task w/ supervision. Pt will perform therex/theract in order to increase strength and functional activity tolerance necessary for increased independence w/ ADL routine.     Pt goal: go home, get stronger  Time Frame for STGs: discharge    Equipment: Continue to assess at next LOC    Time:   Time in: 1137  Time out: 1210  Total time: 33  Timed treatment minutes: 23        Electronically signed by:      MAGALY Staton/ROLANDO  [id number]

## 2023-01-28 NOTE — PROGRESS NOTES
Physician Progress Note      PATIENTLaree Cabot  CSN #:                  [id number]  :                       1940  ADMIT DATE:       2023 8:37 AM  DISCH DATE:  Shamika Montes  PROVIDER #:        Jessika Gates MD          QUERY TEXT:    Pt admitted with UTI and has encephalopathy documented. If possible, please   document in progress notes and discharge summary further specificity regarding   the type of encephalopathy:    The medical record reflects the following:  Risk Factors:  fall, UTI, advanced age  Clinical Indicators: Pt presents with UTI, diagnosed with encephalopathy. Per   attestation in H&P by Dr. Paola Darnell, \"Acute encephalopathy, likely concussion   from fall. Also has evidence of UTI with urinary retention. \" Also per H&P by   Ra Arce NP, \"Acute encephalopathy in setting of UTI. Head CT   nonacute. \" CT HEAD: negative for acute process. Treatment: IV ABX, IVF, labs, imaging    Thank you,  Anette Panda, RN  [phone]  Options provided:  -- Metabolic encephalopathy  -- Other - I will add my own diagnosis  -- Disagree - Not applicable / Not valid  -- Disagree - Clinically unable to determine / Unknown  -- Refer to Clinical Documentation Reviewer    PROVIDER RESPONSE TEXT:    This patient has metabolic encephalopathy.     Query created by: Liberty Valles on 2023 8:18 AM      Electronically signed by:  Jessika Gates MD 2023 10:54 AM

## 2023-01-28 NOTE — PROGRESS NOTES
V2.0  [facility] [place]      Name:  Sherri Cowart /Age/Sex: 1940  (80 y.o. male)   MRN & CSN:  [id number] & [id number] Encounter Date/Time: 2023 5:02 PM EST    Location:  -A PCP: Esme Abraham Day: 2    Assessment and Plan:   Sherri Cowart is a 80 y.o. male with past medical history of coronary artery disease status post CABG, CVA, SAH, persistent atrial fibrillation status post cardioversion on anticoagulation, hypertension, diabetes mellitus, obstructive sleep apnea on CPAP, tobacco abuse who presented after mechanical fall from assisted living facility. Mechanical fall  Acute metabolic encephalopathy  Likely secondary to complicated urinary tract infection  Appears to be at baseline-alert oriented x3. Denies loss of consciousness. Ammonia level 25  TSH within normal limits, vitamin B12 elevated  CT brain, CT cervical spine, x-ray pelvis without any acute abnormalities  EKG with normal sinus rhythm, no high-grade AV blocks. Reports multiple falls over the past 3 months without loss of consciousness. No stool or urinary incontinence noticed on examination  PT/OT has been consulted    Complicated urinary tract infection  No prior culture data available  Started on IV Rocephin  Target based on culture and sensitivity    Acute kidney injury  Likely secondary to obstructive uropathy  Improving  Monitor renal function parameters avoid  Avoid nephrotoxic agents  Bladder scan every 6 hours  Continue Flomax    Persistent atrial fibrillation status post cardioversion  Currently normal sinus rhythm  Continue Eliquis, will discuss risk-benefit with family members    Hypertension  Low normal blood pressure trend  Hold antihypertensive medications      Diet ADULT DIET; Regular;  Low Sodium (2 gm)   DVT Prophylaxis [] Lovenox, []  Heparin, [] SCDs, [] Ambulation,  [x] Eliquis, [] Xarelto  [] Coumadin   Code Status Full Code   Disposition From: AUGUST  Expected Disposition: AUGUST  Estimated Date of Discharge: 2-3 days  Patient requires continued admission due to PT OT, urine culture   Surrogate Decision Maker/ PEDRITO Sanchez     Subjective:     Chief Complaint: Rin Albarran is a 80 y.o. male who presents with confusion and after sustaining a fall at assisted living facility. Patient was seen and evaluated bedside earlier this morning. Patient was alert oriented x3. During my encounter he did forget where he came from. Left frontoparietal skull abrasion noticed. Objective: Intake/Output Summary (Last 24 hours) at 1/28/2023 1702  Last data filed at 1/28/2023 0657  Gross per 24 hour   Intake --   Output 2050 ml   Net -2050 ml        Vitals:   Vitals:    01/28/23 1604   BP:    Pulse: 78   Resp:    Temp:    SpO2:        Physical Exam:   Physical Exam  Vitals reviewed. Constitutional:       Appearance: Normal appearance. He is obese. HENT:      Head: Normocephalic and atraumatic. Nose: Nose normal.      Mouth/Throat:      Mouth: Mucous membranes are dry. Pharynx: Oropharynx is clear. Eyes:      General: No scleral icterus. Conjunctiva/sclera: Conjunctivae normal.   Cardiovascular:      Rate and Rhythm: Normal rate and regular rhythm. Pulses: Normal pulses. Heart sounds: Normal heart sounds. No murmur heard. Pulmonary:      Effort: Pulmonary effort is normal.      Breath sounds: Normal breath sounds. No wheezing, rhonchi or rales. Abdominal:      General: Abdomen is flat. Bowel sounds are normal. There is no distension. Palpations: Abdomen is soft. Tenderness: There is no abdominal tenderness. Musculoskeletal:         General: No deformity. Normal range of motion. Cervical back: Neck supple. No rigidity. Right lower leg: No edema. Left lower leg: No edema. Skin:     Coloration: Skin is not jaundiced or pale. Findings: Bruising present. Neurological:      General: No focal deficit present. Mental Status: He is alert and oriented to person, place, and time. Mental status is at baseline.           Medications:   Medications:    sodium chloride flush  5-40 mL IntraVENous 2 times per day    cefTRIAXone (ROCEPHIN) IV  1,000 mg IntraVENous Q24H    apixaban  5 mg Oral BID    atorvastatin  40 mg Oral Nightly    [Held by provider] mirtazapine  7.5 mg Oral Nightly    pantoprazole  40 mg Oral QAM AC    tamsulosin  0.4 mg Oral Nightly    traZODone  50 mg Oral Daily    neomycin-bacitracin-polymyxin   Topical BID      Infusions:    sodium chloride      dextrose       PRN Meds: docusate sodium, 100 mg, BID PRN  sodium chloride flush, 5-40 mL, PRN  sodium chloride, , PRN  ondansetron, 4 mg, Q8H PRN   Or  ondansetron, 4 mg, Q6H PRN  polyethylene glycol, 17 g, Daily PRN  acetaminophen, 650 mg, Q6H PRN   Or  acetaminophen, 650 mg, Q6H PRN  glucose, 4 tablet, PRN  dextrose bolus, 125 mL, PRN   Or  dextrose bolus, 250 mL, PRN  glucagon (rDNA), 1 mg, PRN  dextrose, , Continuous PRN        Labs      Recent Results (from the past 24 hour(s))   Ammonia    Collection Time: 01/27/23 11:07 PM   Result Value Ref Range    Ammonia 25 16 - 60 UMOL/L   Vitamin B12 & Folate    Collection Time: 01/27/23 11:07 PM   Result Value Ref Range    Vitamin B-12 1085 (H) 211 - 911 pg/ml    Folate 9.1 3.1 - 17.5 NG/ML   TSH    Collection Time: 01/27/23 11:07 PM   Result Value Ref Range    TSH, High Sensitivity 0.360 0.270 - 4.20 uIu/ml   Basic Metabolic Panel w/ Reflex to MG    Collection Time: 01/28/23 12:08 AM   Result Value Ref Range    Sodium 140 135 - 145 MMOL/L    Potassium 4.4 3.5 - 5.1 MMOL/L    Chloride 106 99 - 110 mMol/L    CO2 25 21 - 32 MMOL/L    Anion Gap 9 4 - 16    BUN 54 (H) 6 - 23 MG/DL    Creatinine 1.2 0.9 - 1.3 MG/DL    Est, Glom Filt Rate >60 >60 mL/min/1.73m2    Glucose 107 (H) 70 - 99 MG/DL    Calcium 8.4 8.3 - 10.6 MG/DL   CBC with Auto Differential    Collection Time: 01/28/23 12:08 AM   Result Value Ref Range    WBC 7.8 4.0 - 10.5 K/CU MM    RBC 3.94 (L) 4.6 - 6.2 M/CU MM    Hemoglobin 12.5 (L) 13.5 - 18.0 GM/DL    Hematocrit 38.5 (L) 42 - 52 %    MCV 97.7 78 - 100 FL    MCH 31.7 (H) 27 - 31 PG    MCHC 32.5 32.0 - 36.0 %    RDW 15.2 (H) 11.7 - 14.9 %    Platelets 852 541 - 015 K/CU MM    MPV 10.3 7.5 - 11.1 FL    Differential Type AUTOMATED DIFFERENTIAL     Segs Relative 83.4 (H) 36 - 66 %    Lymphocytes % 8.7 (L) 24 - 44 %    Monocytes % 6.3 (H) 0 - 4 %    Eosinophils % 0.9 0 - 3 %    Basophils % 0.3 0 - 1 %    Segs Absolute 6.5 K/CU MM    Lymphocytes Absolute 0.7 K/CU MM    Monocytes Absolute 0.5 K/CU MM    Eosinophils Absolute 0.1 K/CU MM    Basophils Absolute 0.0 K/CU MM    Nucleated RBC % 0.0 %    Total Nucleated RBC 0.0 K/CU MM    Total Immature Neutrophil 0.03 K/CU MM    Immature Neutrophil % 0.4 0 - 0.43 %        Imaging/Diagnostics Last 24 Hours   CT ABDOMEN PELVIS WO CONTRAST Additional Contrast? None    Result Date: 1/27/2023  EXAMINATION: CT OF THE ABDOMEN AND PELVIS WITHOUT CONTRAST 1/27/2023 1:58 pm TECHNIQUE: CT of the abdomen and pelvis was performed without the administration of intravenous contrast. Multiplanar reformatted images are provided for review. Automated exposure control, iterative reconstruction, and/or weight based adjustment of the mA/kV was utilized to reduce the radiation dose to as low as reasonably achievable. COMPARISON: None. HISTORY: ORDERING SYSTEM PROVIDED HISTORY: JOSH, UTI, confusion; fell of SNF bed last night TECHNOLOGIST PROVIDED HISTORY: Reason for exam:->JOSH, UTI, confusion; fell of SNF bed last night Additional Contrast?->None Decision Support Exception - unselect if not a suspected or confirmed emergency medical condition->Emergency Medical Condition (MA) Reason for Exam: JOSH, UTI, confusion; fell of SNF bed last night Relevant Medical/Surgical History: none FINDINGS: Lower Chest: Lung bases are clear. Coronary artery disease is appreciated. Organs:  The solid organs are limited in evaluation due to lack of use of intravenous contrast. Cholelithiasis is identified. Multiple bilateral renal cysts are identified more numerous and larger on the right. No hydronephrosis is identified. GI/Bowel: The bowel is normal in caliber. No free air is identified. The appendix is normal.  Mild diverticulosis is identified without evidence of acute diverticulitis. Pelvis: The urinary bladder is intact. The prostate gland is enlarged and measures 6.5 cm in width. Peritoneum/Retroperitoneum: No lymphadenopathy is appreciated. Moderate aortic atherosclerosis is identified. Bones/Soft Tissues: Right total hip arthroplasty appears uncomplicated. Osteopenia is noted. No acute osseous abnormality is identified. No acute abnormality within the abdomen or pelvis. Numerous bilateral renal cysts without evidence for hydronephrosis. Osteopenia. No gross acute fracture. XR PELVIS (1-2 VIEWS)    Result Date: 1/27/2023  EXAMINATION: ONE XRAY VIEW OF THE PELVIS 1/27/2023 9:17 am COMPARISON: None. HISTORY: ORDERING SYSTEM PROVIDED HISTORY: fell out of bed; remote h/o of right hip fracture TECHNOLOGIST PROVIDED HISTORY: Reason for exam:->fell out of bed; remote h/o of right hip fracture Reason for Exam: fell out of bed; remote h/o of right hip fracture FINDINGS: The bones are osteopenic. There is a right hip arthroplasty. No acute fractures or dislocations are seen. There is no diastases of the pubic symphysis or sacroiliac joints. There are degenerative changes involving the left hip. 1. No acute abnormality involving the pelvis.      CT HEAD WO CONTRAST    Result Date: 1/27/2023  EXAMINATION: CT OF THE HEAD WITHOUT CONTRAST; CT OF THE CERVICAL SPINE WITHOUT CONTRAST 1/27/2023 9:29 am TECHNIQUE: CT of the head was performed without the administration of intravenous contrast. Automated exposure control, iterative reconstruction, and/or weight based adjustment of the mA/kV was utilized to reduce the radiation dose to as low as reasonably achievable.; CT of the cervical spine was performed without the administration of intravenous contrast. Multiplanar reformatted images are provided for review. Automated exposure control, iterative reconstruction, and/or weight based adjustment of the mA/kV was utilized to reduce the radiation dose to as low as reasonably achievable. COMPARISON: None. HISTORY: ORDERING SYSTEM PROVIDED HISTORY: fell out of bed; head injury/scalp hematoma; possible ams, generalized  weakness TECHNOLOGIST PROVIDED HISTORY: Reason for exam:->fell out of bed; head injury/scalp hematoma; possible ams, generalized  weakness Has a \"code stroke\" or \"stroke alert\" been called? ->No Decision Support Exception - unselect if not a suspected or confirmed emergency medical condition->Emergency Medical Condition (MA) Reason for Exam: fell out of bed; head injury/scalp hematoma; possible ams, generalized  weakness Additional signs and symptoms: unknown Relevant Medical/Surgical History: poor historian FINDINGS: CT SCAN HEAD: BRAIN/VENTRICLES: There is no acute intracranial hemorrhage, mass effect or midline shift. No abnormal extra-axial fluid collection. The gray-white differentiation is maintained without evidence of an acute infarct. There is no evidence of hydrocephalus. The cerebral sulci and ventricles are enlarged compatible with diffuse cerebral atrophy. There is low-attenuation in the periventricular white matter and deep white matter of the brain representing changes of chronic small vessel ischemic disease. ORBITS: The visualized portion of the orbits demonstrate no acute abnormality. SINUSES: The visualized paranasal sinuses and mastoid air cells demonstrate no acute abnormality. There is mucosal disease involving the maxillary and ethmoid sinuses. SOFT TISSUES/SKULL:  No acute abnormality involving the visualized skull or soft tissues.  CT SCAN CERVICAL SPINE: BONES/ALIGNMENT: The alignment of the cervical spine is within normal limits. No acute fractures or dislocations are seen. DEGENERATIVE CHANGES: There is multilevel degenerative disease of the cervical spine. SOFT TISSUES: There is no prevertebral soft tissue swelling. 1. No acute intracranial abnormality. 2. No acute traumatic abnormality involving the cervical spine. CT CERVICAL SPINE WO CONTRAST    Result Date: 1/27/2023  EXAMINATION: CT OF THE HEAD WITHOUT CONTRAST; CT OF THE CERVICAL SPINE WITHOUT CONTRAST 1/27/2023 9:29 am TECHNIQUE: CT of the head was performed without the administration of intravenous contrast. Automated exposure control, iterative reconstruction, and/or weight based adjustment of the mA/kV was utilized to reduce the radiation dose to as low as reasonably achievable.; CT of the cervical spine was performed without the administration of intravenous contrast. Multiplanar reformatted images are provided for review. Automated exposure control, iterative reconstruction, and/or weight based adjustment of the mA/kV was utilized to reduce the radiation dose to as low as reasonably achievable. COMPARISON: None. HISTORY: ORDERING SYSTEM PROVIDED HISTORY: fell out of bed; head injury/scalp hematoma; possible ams, generalized  weakness TECHNOLOGIST PROVIDED HISTORY: Reason for exam:->fell out of bed; head injury/scalp hematoma; possible ams, generalized  weakness Has a \"code stroke\" or \"stroke alert\" been called? ->No Decision Support Exception - unselect if not a suspected or confirmed emergency medical condition->Emergency Medical Condition (MA) Reason for Exam: fell out of bed; head injury/scalp hematoma; possible ams, generalized  weakness Additional signs and symptoms: unknown Relevant Medical/Surgical History: poor historian FINDINGS: CT SCAN HEAD: BRAIN/VENTRICLES: There is no acute intracranial hemorrhage, mass effect or midline shift. No abnormal extra-axial fluid collection.   The gray-white differentiation is maintained without evidence of an acute infarct. There is no evidence of hydrocephalus. The cerebral sulci and ventricles are enlarged compatible with diffuse cerebral atrophy. There is low-attenuation in the periventricular white matter and deep white matter of the brain representing changes of chronic small vessel ischemic disease. ORBITS: The visualized portion of the orbits demonstrate no acute abnormality. SINUSES: The visualized paranasal sinuses and mastoid air cells demonstrate no acute abnormality. There is mucosal disease involving the maxillary and ethmoid sinuses. SOFT TISSUES/SKULL:  No acute abnormality involving the visualized skull or soft tissues. CT SCAN CERVICAL SPINE: BONES/ALIGNMENT: The alignment of the cervical spine is within normal limits. No acute fractures or dislocations are seen. DEGENERATIVE CHANGES: There is multilevel degenerative disease of the cervical spine. SOFT TISSUES: There is no prevertebral soft tissue swelling. 1. No acute intracranial abnormality. 2. No acute traumatic abnormality involving the cervical spine. XR CHEST PORTABLE    Result Date: 1/27/2023  EXAMINATION: ONE XRAY VIEW OF THE CHEST 1/27/2023 9:17 am COMPARISON: None. HISTORY: ORDERING SYSTEM PROVIDED HISTORY: fell out of bed last night; confusion; TECHNOLOGIST PROVIDED HISTORY: Reason for exam:->fell out of bed last night; confusion; Reason for Exam: fell out of bed last night; confusion; FINDINGS: There are postsurgical changes of median sternotomy. The heart size and pulmonary vasculature are within normal limits. No acute infiltrates are seen. There are calcified lymph nodes and granulomas. There is elevation of the right hemidiaphragm. There are degenerative changes involving the shoulders. 1. Elevation of the right hemidiaphragm of uncertain etiology. Otherwise, no active pulmonary disease.        Electronically signed by Ellen Stewart MD on 1/28/2023 at 5:02 PM

## 2023-01-29 PROCEDURE — 6370000000 HC RX 637 (ALT 250 FOR IP): Performed by: STUDENT IN AN ORGANIZED HEALTH CARE EDUCATION/TRAINING PROGRAM

## 2023-01-29 PROCEDURE — 2060000000 HC ICU INTERMEDIATE R&B

## 2023-01-29 PROCEDURE — 2580000003 HC RX 258: Performed by: NURSE PRACTITIONER

## 2023-01-29 PROCEDURE — 6360000002 HC RX W HCPCS: Performed by: NURSE PRACTITIONER

## 2023-01-29 PROCEDURE — 2500000003 HC RX 250 WO HCPCS: Performed by: STUDENT IN AN ORGANIZED HEALTH CARE EDUCATION/TRAINING PROGRAM

## 2023-01-29 PROCEDURE — 6370000000 HC RX 637 (ALT 250 FOR IP): Performed by: NURSE PRACTITIONER

## 2023-01-29 PROCEDURE — 94761 N-INVAS EAR/PLS OXIMETRY MLT: CPT

## 2023-01-29 RX ORDER — LANOLIN ALCOHOL/MO/W.PET/CERES
6 CREAM (GRAM) TOPICAL NIGHTLY PRN
Status: DISCONTINUED | OUTPATIENT
Start: 2023-01-29 | End: 2023-02-02 | Stop reason: HOSPADM

## 2023-01-29 RX ORDER — OLANZAPINE 10 MG/1
2.5 INJECTION, POWDER, LYOPHILIZED, FOR SOLUTION INTRAMUSCULAR ONCE
Status: COMPLETED | OUTPATIENT
Start: 2023-01-29 | End: 2023-01-29

## 2023-01-29 RX ADMIN — BACITRACIN, NEOMYCIN, POLYMYXIN B: 400; 3.5; 5 OINTMENT TOPICAL at 10:16

## 2023-01-29 RX ADMIN — OLANZAPINE 2.5 MG: 10 INJECTION, POWDER, FOR SOLUTION INTRAMUSCULAR at 05:32

## 2023-01-29 RX ADMIN — Medication 10 ML: at 10:18

## 2023-01-29 RX ADMIN — APIXABAN 5 MG: 5 TABLET, FILM COATED ORAL at 19:38

## 2023-01-29 RX ADMIN — TRAZODONE HYDROCHLORIDE 50 MG: 50 TABLET ORAL at 20:26

## 2023-01-29 RX ADMIN — Medication 10 ML: at 19:38

## 2023-01-29 RX ADMIN — CEFTRIAXONE 1000 MG: 1 INJECTION, POWDER, FOR SOLUTION INTRAMUSCULAR; INTRAVENOUS at 19:40

## 2023-01-29 RX ADMIN — TAMSULOSIN HYDROCHLORIDE 0.4 MG: 0.4 CAPSULE ORAL at 19:37

## 2023-01-29 RX ADMIN — APIXABAN 5 MG: 5 TABLET, FILM COATED ORAL at 10:17

## 2023-01-29 RX ADMIN — ATORVASTATIN CALCIUM 40 MG: 40 TABLET, FILM COATED ORAL at 19:38

## 2023-01-29 RX ADMIN — BACITRACIN, NEOMYCIN, POLYMYXIN B: 400; 3.5; 5 OINTMENT TOPICAL at 19:38

## 2023-01-29 RX ADMIN — PANTOPRAZOLE SODIUM 40 MG: 40 TABLET, DELAYED RELEASE ORAL at 05:32

## 2023-01-29 RX ADMIN — Medication 6 MG: at 21:24

## 2023-01-29 ASSESSMENT — PAIN SCALES - GENERAL
PAINLEVEL_OUTOF10: 0

## 2023-01-29 NOTE — PLAN OF CARE
Problem: Safety - Adult    Goal: Free from fall injury    Outcome: Progressing         Problem: Musculoskeletal - Adult    Goal: Return mobility to safest level of function    Outcome: Progressing

## 2023-01-29 NOTE — PROGRESS NOTES
Physical Therapy  Facility/Department: [address] [place] [address] ICU STEPDOWN  Physical Therapy Initial Assessment    Name: Fatuma Simon  : 1940  MRN: [id number]  Date of Service: 2023    Discharge Recommendations:  Subacute/Skilled Nursing Facility              Assessment   Assessment: Pt is an 80 y.o. male with medical history, surgical history, co-morbidities, and personal factors including atrial fibrillation ([facility] Cerebral artery occlusion with cerebral infarction ([facility] [address] Diabetes mellitus ([facility] [address] Hyperlipidemia, and Hypertension with admission for Urinary tract infection with hematuria, Generalized weakness, Fall from bed, Closed head injury, and Urinary retention. Prior to admission, pt was independent with functional mobility and required assistance with ADLs. Examination of body systems reveals decreased strength, decreased balance, decreased aerobic capacity, impaired cognition, and decreased independence with functional mobility. Therapy Prognosis: Good  Decision Making: High Complexity  Clinical Presentation: unpredictable characteristics  Requires PT Follow-Up: Yes  Activity Tolerance  Activity Tolerance: Patient tolerated evaluation without incident     Plan   Physcial Therapy Plan  General Plan: 3-5 times per week  Current Treatment Recommendations: Strengthening, ROM, Balance training, Functional mobility training, Transfer training, ADL/Self-care training, IADL training, Cognitive/Perceptual training, Endurance training, Equipment evaluation, education, & procurement, Pain management, Gait training, Stair training, Home exercise program, Positioning, Neuromuscular re-education, Safety education & training, Therapeutic activities, Patient/Caregiver education & training, Cognitive reorientation  Safety Devices  Type of Devices:  All fall risk precautions in place, Patient at risk for falls, Call light within reach, Left in chair, Chair alarm in place, Gait belt, Nurse notified Restrictions  Restrictions/Precautions  Restrictions/Precautions: General Precautions, Fall Risk     Subjective   General  Chart Reviewed: Yes  Patient assessed for rehabilitation services?: Yes  Family / Caregiver Present: No  Follows Commands: Impaired  Subjective  Subjective: pain: denies; 0/10         Social/Functional History  Social/Functional History  Lives With: Alone  Type of Home: Assisted living  Bathroom Shower/Tub: Walk-in shower  Bathroom Toilet: Handicap height  Home Equipment: Alert Button, Rollator  Has the patient had two or more falls in the past year or any fall with injury in the past year?: Yes  ADL Assistance: Needs assistance  Homemaking Responsibilities: No  Ambulation Assistance: Independent  Transfer Assistance: Independent  Vision/Hearing  Vision  Vision: Within Functional Limits  Hearing  Hearing: Within functional limits    Cognition   Orientation  Overall Orientation Status: Impaired  Orientation Level: Disoriented to time;Oriented to person  Cognition  Overall Cognitive Status: Exceptions  Arousal/Alertness: Appropriate responses to stimuli  Following Commands: Follows one step commands with repetition  Attention Span: Attends with cues to redirect  Memory: Decreased short term memory;Decreased long term memory  Safety Judgement: Decreased awareness of need for safety;Decreased awareness of need for assistance  Problem Solving: Decreased awareness of errors  Insights: Not aware of deficits  Initiation: Requires cues for some  Sequencing: Requires cues for some     Objective           Gross Assessment  Sensation: Intact (BLEs)        Strength RLE  Comment: knee extension: 4-/5, ankle DF: 4/5  Strength LLE  Comment: knee extension: 4-/5, ankle DF: 4/5           Bed mobility  Supine to Sit: Minimal assistance  Transfers  Sit to Stand: Minimal Assistance (with verbal cues to push through bed/chair/grab bar and avoid pulling on walker)  Stand to Sit: Minimal Assistance; Moderate Assistance (with verbal cues to feel chair/toilet against back of legs, reach back, and sit slowly)  Ambulation  Surface: Level tile  Device: Rolling Walker  Assistance: Minimal assistance; Moderate assistance  Quality of Gait: decreased gait speed, decreased step length bilaterally, intermittent retropulsion, unsteady gait  Distance: 35 feet + 25 feet + 10 feet  Comments: with verbal and tactile cues for BLE placement, walker placement, and sequence throughout ambulation; with verbal and tactile cues to maintain upright posture in order to avoid COM shifting outside of SERINA; with verbal cues for directions in order to successfully navigate hallway and return to correct room     Balance  Posture: Fair  Sitting - Static: Good  Sitting - Dynamic: Good  Standing - Static: Poor;+  Standing - Dynamic: Poor;+         Gait Training:  Cues were given for safety, sequence, device management, balance, posture, awareness, path. Therapeutic Activity Training:   Therapeutic activity training was instructed today. Cues were given for safety, sequence, UE/LE placement, awareness, and balance. Activities performed today included bed mobility training, sup-sit, sit-stand. AM-PAC Score  AM-PAC Inpatient Mobility Raw Score : 13 (01/28/23 2031)  AM-PAC Inpatient T-Scale Score : 36.74 (01/28/23 2031)  Mobility Inpatient CMS 0-100% Score: 64.91 (01/28/23 2031)  Mobility Inpatient CMS G-Code Modifier : CL (01/28/23 2031)            Goals  Long Term Goals  Time Frame for Long Term Goals : In one week:  Long Term Goal 1: Pt will complete all bed mobility with SBAx1  Long Term Goal 2: Pt will complete sit <> stand transfers with SBAx1  Long Term Goal 3: Pt will ambulate 250 feet with CGAx1 with LRAD  Long Term Goal 4: Pt will independently complete 3 sets of 10 reps of BLE AROM exercises in available and allowed ROM       Education  Patient Education  Education Given To: Patient  Education Provided: Role of Therapy; Energy Conservation; Fall Prevention Strategies; Plan of Care;IADL Safety; ADL Adaptive Strategies; Equipment;Transfer Training  Education Method: Demonstration;Verbal  Education Outcome: Verbalized understanding;Demonstrated understanding;Continued education needed      Time In: 1137  Time Out: 1211  Total Treatment Time: 34  Timed Code Treatment Minutes: [address] Ariana Méndez PT, DPT  License #: [id number]

## 2023-01-29 NOTE — PLAN OF CARE
Problem: Discharge Planning  Goal: Discharge to home or other facility with appropriate resources  Outcome: Progressing     Problem: Pain  Goal: Verbalizes/displays adequate comfort level or baseline comfort level  Outcome: Progressing     Problem: Confusion  Goal: Confusion, delirium, dementia, or psychosis is improved or at baseline  Description: INTERVENTIONS:  1. Assess for possible contributors to thought disturbance, including medications, impaired vision or hearing, underlying metabolic abnormalities, dehydration, psychiatric diagnoses, and notify attending LIP  2. [place] high risk fall precautions, as indicated  3. Provide frequent short contacts to provide reality reorientation, refocusing and direction  4. Decrease environmental stimuli, including noise as appropriate  5. Monitor and intervene to maintain adequate nutrition, hydration, elimination, sleep and activity  6. If unable to ensure safety without constant attention obtain sitter and review sitter guidelines with assigned personnel  7. Initiate Psychosocial CNS and Spiritual Care consult, as indicated  Outcome: Progressing     Problem: Skin/Tissue Integrity  Goal: Absence of new skin breakdown  Description: 1. Monitor for areas of redness and/or skin breakdown  2. Assess vascular access sites hourly  3. Every 4-6 hours minimum:  Change oxygen saturation probe site  4. Every 4-6 hours:  If on nasal continuous positive airway pressure, respiratory therapy assess nares and determine need for appliance change or resting period.   Outcome: Progressing

## 2023-01-29 NOTE — PROGRESS NOTES
V2.0  [facility] Hospitalist Progress Note      Name:  Sherri Cowart /Age/Sex: 1940  (80 y.o. male)   MRN & CSN:  [id number] & [id number] Encounter Date/Time: 2023 5:02 PM EST    Location:  -A PCP: Esme Abraham Day: 3    Assessment and Plan:   Sherri Cowart is a 80 y.o. male with past medical history of coronary artery disease status post CABG, CVA, SAH, persistent atrial fibrillation status post cardioversion on anticoagulation, hypertension, diabetes mellitus, obstructive sleep apnea on CPAP, tobacco abuse who presented after mechanical fall from assisted living facility. Mechanical fall  Acute metabolic encephalopathy  Likely secondary to complicated urinary tract infection and underlying delirium with Dementia  Appears to be at baseline-alert oriented x3. Denies loss of consciousness. Ammonia level 25  TSH within normal limits, vitamin B12 elevated  CT brain, CT cervical spine, x-ray pelvis without any acute abnormalities  EKG with normal sinus rhythm, no high-grade AV blocks. Reports multiple falls over the past 3 months without loss of consciousness. No stool or urinary incontinence noticed on examination  PT/OT has been consulted  Delirium precautions    Complicated urinary tract infection  No prior culture data available  Started on IV Rocephin  Target based on culture and sensitivity    Acute kidney injury  Likely secondary to obstructive uropathy  Improving  Monitor renal function parameters avoid  Avoid nephrotoxic agents  Bladder scan every 6 hours  Continue Flomax    Persistent atrial fibrillation status post cardioversion  Currently normal sinus rhythm  Continue Eliquis, will discuss risk-benefit with family members    Hypertension  Low normal blood pressure trend  Hold antihypertensive medications      Diet ADULT DIET; Regular;  Low Sodium (2 gm)   DVT Prophylaxis [] Lovenox, []  Heparin, [] SCDs, [] Ambulation,  [x] Eliquis, [] Xarelto  [] Coumadin   Code Status Full Code   Disposition From: custodial  Expected Disposition: AUGUST  Estimated Date of Discharge: 2-3 days  Patient requires continued admission due to PT OT, urine culture   Surrogate Decision Maker/ PEDRITO Enrique     Subjective:     Chief Complaint: Kirsten Haynes is a 80 y.o. male who presents with confusion and after sustaining a fall at assisted living facility. Patient was seen and evaluated bedside earlier this morning. Patient was alert oriented x1. Left frontoparietal skull abrasion noticed. Objective: Intake/Output Summary (Last 24 hours) at 1/29/2023 1801  Last data filed at 1/29/2023 0610  Gross per 24 hour   Intake --   Output 1500 ml   Net -1500 ml        Vitals:   Vitals:    01/29/23 1601   BP: 117/75   Pulse:    Resp:    Temp: 98.7 °F (37.1 °C)   SpO2:        Physical Exam:   Physical Exam  Vitals reviewed. Constitutional:       Appearance: Normal appearance. He is obese. HENT:      Head: Normocephalic and atraumatic. Nose: Nose normal.      Mouth/Throat:      Mouth: Mucous membranes are dry. Pharynx: Oropharynx is clear. Eyes:      General: No scleral icterus. Conjunctiva/sclera: Conjunctivae normal.   Cardiovascular:      Rate and Rhythm: Normal rate and regular rhythm. Pulses: Normal pulses. Heart sounds: Normal heart sounds. No murmur heard. Pulmonary:      Effort: Pulmonary effort is normal.      Breath sounds: Normal breath sounds. No wheezing, rhonchi or rales. Abdominal:      General: Abdomen is flat. Bowel sounds are normal. There is no distension. Palpations: Abdomen is soft. Tenderness: There is no abdominal tenderness. Musculoskeletal:         General: No deformity. Normal range of motion. Cervical back: Neck supple. No rigidity. Right lower leg: No edema. Left lower leg: No edema. Skin:     Coloration: Skin is not jaundiced or pale. Findings: Bruising present.    Neurological:      General: No focal deficit present. Mental Status: He is alert. He is disoriented. Medications:   Medications:    sodium chloride flush  5-40 mL IntraVENous 2 times per day    cefTRIAXone (ROCEPHIN) IV  1,000 mg IntraVENous Q24H    apixaban  5 mg Oral BID    atorvastatin  40 mg Oral Nightly    [Held by provider] mirtazapine  7.5 mg Oral Nightly    pantoprazole  40 mg Oral QAM AC    tamsulosin  0.4 mg Oral Nightly    traZODone  50 mg Oral Daily    neomycin-bacitracin-polymyxin   Topical BID      Infusions:    sodium chloride 5 mL/hr at 01/28/23 1800    dextrose       PRN Meds: docusate sodium, 100 mg, BID PRN  sodium chloride flush, 5-40 mL, PRN  sodium chloride, , PRN  ondansetron, 4 mg, Q8H PRN   Or  ondansetron, 4 mg, Q6H PRN  polyethylene glycol, 17 g, Daily PRN  acetaminophen, 650 mg, Q6H PRN   Or  acetaminophen, 650 mg, Q6H PRN  glucose, 4 tablet, PRN  dextrose bolus, 125 mL, PRN   Or  dextrose bolus, 250 mL, PRN  glucagon (rDNA), 1 mg, PRN  dextrose, , Continuous PRN      Labs      No results found for this or any previous visit (from the past 24 hour(s)). Imaging/Diagnostics Last 24 Hours   CT ABDOMEN PELVIS WO CONTRAST Additional Contrast? None    Result Date: 1/27/2023  EXAMINATION: CT OF THE ABDOMEN AND PELVIS WITHOUT CONTRAST 1/27/2023 1:58 pm TECHNIQUE: CT of the abdomen and pelvis was performed without the administration of intravenous contrast. Multiplanar reformatted images are provided for review. Automated exposure control, iterative reconstruction, and/or weight based adjustment of the mA/kV was utilized to reduce the radiation dose to as low as reasonably achievable. COMPARISON: None.  HISTORY: ORDERING SYSTEM PROVIDED HISTORY: JOSH, UTI, confusion; fell of SNF bed last night TECHNOLOGIST PROVIDED HISTORY: Reason for exam:->JOSH, UTI, confusion; fell of SNF bed last night Additional Contrast?->None Decision Support Exception - unselect if not a suspected or confirmed emergency medical condition->Emergency Medical Condition (MA) Reason for Exam: JOSH, UTI, confusion; fell of SNF bed last night Relevant Medical/Surgical History: none FINDINGS: Lower Chest: Lung bases are clear. Coronary artery disease is appreciated. Organs: The solid organs are limited in evaluation due to lack of use of intravenous contrast. Cholelithiasis is identified. Multiple bilateral renal cysts are identified more numerous and larger on the right. No hydronephrosis is identified. GI/Bowel: The bowel is normal in caliber. No free air is identified. The appendix is normal.  Mild diverticulosis is identified without evidence of acute diverticulitis. Pelvis: The urinary bladder is intact. The prostate gland is enlarged and measures 6.5 cm in width. Peritoneum/Retroperitoneum: No lymphadenopathy is appreciated. Moderate aortic atherosclerosis is identified. Bones/Soft Tissues: Right total hip arthroplasty appears uncomplicated. Osteopenia is noted. No acute osseous abnormality is identified. No acute abnormality within the abdomen or pelvis. Numerous bilateral renal cysts without evidence for hydronephrosis. Osteopenia. No gross acute fracture. XR PELVIS (1-2 VIEWS)    Result Date: 1/27/2023  EXAMINATION: ONE XRAY VIEW OF THE PELVIS 1/27/2023 9:17 am COMPARISON: None. HISTORY: ORDERING SYSTEM PROVIDED HISTORY: fell out of bed; remote h/o of right hip fracture TECHNOLOGIST PROVIDED HISTORY: Reason for exam:->fell out of bed; remote h/o of right hip fracture Reason for Exam: fell out of bed; remote h/o of right hip fracture FINDINGS: The bones are osteopenic. There is a right hip arthroplasty. No acute fractures or dislocations are seen. There is no diastases of the pubic symphysis or sacroiliac joints. There are degenerative changes involving the left hip. 1. No acute abnormality involving the pelvis.      CT HEAD WO CONTRAST    Result Date: 1/27/2023  EXAMINATION: CT OF THE HEAD WITHOUT CONTRAST; CT OF THE CERVICAL SPINE WITHOUT CONTRAST 1/27/2023 9:29 am TECHNIQUE: CT of the head was performed without the administration of intravenous contrast. Automated exposure control, iterative reconstruction, and/or weight based adjustment of the mA/kV was utilized to reduce the radiation dose to as low as reasonably achievable.; CT of the cervical spine was performed without the administration of intravenous contrast. Multiplanar reformatted images are provided for review. Automated exposure control, iterative reconstruction, and/or weight based adjustment of the mA/kV was utilized to reduce the radiation dose to as low as reasonably achievable. COMPARISON: None. HISTORY: ORDERING SYSTEM PROVIDED HISTORY: fell out of bed; head injury/scalp hematoma; possible ams, generalized  weakness TECHNOLOGIST PROVIDED HISTORY: Reason for exam:->fell out of bed; head injury/scalp hematoma; possible ams, generalized  weakness Has a \"code stroke\" or \"stroke alert\" been called? ->No Decision Support Exception - unselect if not a suspected or confirmed emergency medical condition->Emergency Medical Condition (MA) Reason for Exam: fell out of bed; head injury/scalp hematoma; possible ams, generalized  weakness Additional signs and symptoms: unknown Relevant Medical/Surgical History: poor historian FINDINGS: CT SCAN HEAD: BRAIN/VENTRICLES: There is no acute intracranial hemorrhage, mass effect or midline shift. No abnormal extra-axial fluid collection. The gray-white differentiation is maintained without evidence of an acute infarct. There is no evidence of hydrocephalus. The cerebral sulci and ventricles are enlarged compatible with diffuse cerebral atrophy. There is low-attenuation in the periventricular white matter and deep white matter of the brain representing changes of chronic small vessel ischemic disease. ORBITS: The visualized portion of the orbits demonstrate no acute abnormality.  SINUSES: The visualized paranasal sinuses and mastoid air cells demonstrate no acute abnormality.  There is mucosal disease involving the maxillary and ethmoid sinuses. SOFT TISSUES/SKULL:  No acute abnormality involving the visualized skull or soft tissues. CT SCAN CERVICAL SPINE: BONES/ALIGNMENT: The alignment of the cervical spine is within normal limits. No acute fractures or dislocations are seen. DEGENERATIVE CHANGES: There is multilevel degenerative disease of the cervical spine. SOFT TISSUES: There is no prevertebral soft tissue swelling.     1. No acute intracranial abnormality. 2. No acute traumatic abnormality involving the cervical spine.     CT CERVICAL SPINE WO CONTRAST    Result Date: 1/27/2023  EXAMINATION: CT OF THE HEAD WITHOUT CONTRAST; CT OF THE CERVICAL SPINE WITHOUT CONTRAST 1/27/2023 9:29 am TECHNIQUE: CT of the head was performed without the administration of intravenous contrast. Automated exposure control, iterative reconstruction, and/or weight based adjustment of the mA/kV was utilized to reduce the radiation dose to as low as reasonably achievable.; CT of the cervical spine was performed without the administration of intravenous contrast. Multiplanar reformatted images are provided for review. Automated exposure control, iterative reconstruction, and/or weight based adjustment of the mA/kV was utilized to reduce the radiation dose to as low as reasonably achievable. COMPARISON: None. HISTORY: ORDERING SYSTEM PROVIDED HISTORY: fell out of bed; head injury/scalp hematoma; possible ams, generalized  weakness TECHNOLOGIST PROVIDED HISTORY: Reason for exam:->fell out of bed; head injury/scalp hematoma; possible ams, generalized  weakness Has a \"code stroke\" or \"stroke alert\" been called?->No Decision Support Exception - unselect if not a suspected or confirmed emergency medical condition->Emergency Medical Condition (MA) Reason for Exam: fell out of bed; head injury/scalp hematoma; possible ams, generalized  weakness Additional signs and  symptoms: unknown Relevant Medical/Surgical History: poor historian FINDINGS: CT SCAN HEAD: BRAIN/VENTRICLES: There is no acute intracranial hemorrhage, mass effect or midline shift. No abnormal extra-axial fluid collection. The gray-white differentiation is maintained without evidence of an acute infarct. There is no evidence of hydrocephalus. The cerebral sulci and ventricles are enlarged compatible with diffuse cerebral atrophy. There is low-attenuation in the periventricular white matter and deep white matter of the brain representing changes of chronic small vessel ischemic disease. ORBITS: The visualized portion of the orbits demonstrate no acute abnormality. SINUSES: The visualized paranasal sinuses and mastoid air cells demonstrate no acute abnormality. There is mucosal disease involving the maxillary and ethmoid sinuses. SOFT TISSUES/SKULL:  No acute abnormality involving the visualized skull or soft tissues. CT SCAN CERVICAL SPINE: BONES/ALIGNMENT: The alignment of the cervical spine is within normal limits. No acute fractures or dislocations are seen. DEGENERATIVE CHANGES: There is multilevel degenerative disease of the cervical spine. SOFT TISSUES: There is no prevertebral soft tissue swelling. 1. No acute intracranial abnormality. 2. No acute traumatic abnormality involving the cervical spine. XR CHEST PORTABLE    Result Date: 1/27/2023  EXAMINATION: ONE XRAY VIEW OF THE CHEST 1/27/2023 9:17 am COMPARISON: None. HISTORY: ORDERING SYSTEM PROVIDED HISTORY: fell out of bed last night; confusion; TECHNOLOGIST PROVIDED HISTORY: Reason for exam:->fell out of bed last night; confusion; Reason for Exam: fell out of bed last night; confusion; FINDINGS: There are postsurgical changes of median sternotomy. The heart size and pulmonary vasculature are within normal limits. No acute infiltrates are seen. There are calcified lymph nodes and granulomas. There is elevation of the right hemidiaphragm. There are degenerative changes involving the shoulders. 1. Elevation of the right hemidiaphragm of uncertain etiology. Otherwise, no active pulmonary disease.        Electronically signed by Silva Kwong MD on 1/29/2023 at 6:01 PM

## 2023-01-30 PROBLEM — I69.898 OTHER SEQUELAE OF OTHER CEREBROVASCULAR DISEASE: Status: ACTIVE | Noted: 2023-01-30

## 2023-01-30 PROBLEM — F05 DELIRIUM DUE TO MEDICAL CONDITION WITH BEHAVIORAL DISTURBANCE: Status: ACTIVE | Noted: 2023-01-30

## 2023-01-30 PROBLEM — F41.1 GAD (GENERALIZED ANXIETY DISORDER): Status: ACTIVE | Noted: 2023-01-30

## 2023-01-30 PROBLEM — F01.C0: Status: ACTIVE | Noted: 2023-01-30

## 2023-01-30 PROCEDURE — 6370000000 HC RX 637 (ALT 250 FOR IP): Performed by: NURSE PRACTITIONER

## 2023-01-30 PROCEDURE — 6360000002 HC RX W HCPCS: Performed by: NURSE PRACTITIONER

## 2023-01-30 PROCEDURE — 93005 ELECTROCARDIOGRAM TRACING: CPT | Performed by: NURSE PRACTITIONER

## 2023-01-30 PROCEDURE — 2060000000 HC ICU INTERMEDIATE R&B

## 2023-01-30 PROCEDURE — 97530 THERAPEUTIC ACTIVITIES: CPT

## 2023-01-30 PROCEDURE — 2580000003 HC RX 258: Performed by: NURSE PRACTITIONER

## 2023-01-30 PROCEDURE — 2500000003 HC RX 250 WO HCPCS: Performed by: STUDENT IN AN ORGANIZED HEALTH CARE EDUCATION/TRAINING PROGRAM

## 2023-01-30 RX ORDER — DIVALPROEX SODIUM 500 MG/1
500 TABLET, DELAYED RELEASE ORAL NIGHTLY
Status: DISCONTINUED | OUTPATIENT
Start: 2023-01-30 | End: 2023-02-02 | Stop reason: HOSPADM

## 2023-01-30 RX ORDER — DIVALPROEX SODIUM 125 MG/1
125 CAPSULE, COATED PELLETS ORAL EVERY 8 HOURS PRN
Status: DISCONTINUED | OUTPATIENT
Start: 2023-01-30 | End: 2023-02-02 | Stop reason: HOSPADM

## 2023-01-30 RX ORDER — DIVALPROEX SODIUM 125 MG/1
125 CAPSULE, COATED PELLETS ORAL
Status: DISCONTINUED | OUTPATIENT
Start: 2023-01-30 | End: 2023-02-02 | Stop reason: HOSPADM

## 2023-01-30 RX ORDER — OLANZAPINE 10 MG/1
2.5 INJECTION, POWDER, LYOPHILIZED, FOR SOLUTION INTRAMUSCULAR ONCE
Status: COMPLETED | OUTPATIENT
Start: 2023-01-30 | End: 2023-01-30

## 2023-01-30 RX ADMIN — OLANZAPINE 2.5 MG: 10 INJECTION, POWDER, FOR SOLUTION INTRAMUSCULAR at 01:15

## 2023-01-30 RX ADMIN — PANTOPRAZOLE SODIUM 40 MG: 40 TABLET, DELAYED RELEASE ORAL at 11:05

## 2023-01-30 RX ADMIN — BACITRACIN, NEOMYCIN, POLYMYXIN B: 400; 3.5; 5 OINTMENT TOPICAL at 11:07

## 2023-01-30 RX ADMIN — APIXABAN 5 MG: 5 TABLET, FILM COATED ORAL at 11:05

## 2023-01-30 RX ADMIN — DIVALPROEX SODIUM 500 MG: 500 TABLET, DELAYED RELEASE ORAL at 20:52

## 2023-01-30 RX ADMIN — CEFTRIAXONE 1000 MG: 1 INJECTION, POWDER, FOR SOLUTION INTRAMUSCULAR; INTRAVENOUS at 20:18

## 2023-01-30 RX ADMIN — BACITRACIN, NEOMYCIN, POLYMYXIN B: 400; 3.5; 5 OINTMENT TOPICAL at 20:53

## 2023-01-30 RX ADMIN — TAMSULOSIN HYDROCHLORIDE 0.4 MG: 0.4 CAPSULE ORAL at 20:52

## 2023-01-30 RX ADMIN — APIXABAN 5 MG: 5 TABLET, FILM COATED ORAL at 20:52

## 2023-01-30 RX ADMIN — Medication 10 ML: at 11:06

## 2023-01-30 RX ADMIN — DIVALPROEX SODIUM 125 MG: 125 CAPSULE, COATED PELLETS ORAL at 17:09

## 2023-01-30 RX ADMIN — ATORVASTATIN CALCIUM 40 MG: 40 TABLET, FILM COATED ORAL at 20:53

## 2023-01-30 RX ADMIN — Medication 20 ML: at 20:53

## 2023-01-30 ASSESSMENT — ENCOUNTER SYMPTOMS
VOICE CHANGE: 0
ABDOMINAL PAIN: 0
NAUSEA: 0
BACK PAIN: 0
COLOR CHANGE: 0
COUGH: 0
SINUS PAIN: 0
SORE THROAT: 0
SHORTNESS OF BREATH: 0
CHEST TIGHTNESS: 0
CONSTIPATION: 0
WHEEZING: 0
VOMITING: 0
SINUS PRESSURE: 0
DIARRHEA: 0
TROUBLE SWALLOWING: 0

## 2023-01-30 ASSESSMENT — PAIN SCALES - GENERAL: PAINLEVEL_OUTOF10: 0
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
## 2023-01-30 NOTE — CONSULTS
Initial Psychiatric History and Physical    [facility]  [id number]  1/27/2023 01/30/23    ID: Patient is a 80 yrs y.o. male    CC: none noted    HPI: Patient is an 80year old male with PMHx CAD status post CABG, CVA, SAH, persistent atrial fibrillation status post cardioversion on AC, essential hypertension, diabetes mellitus, BEBETO on CPAP, former tobacco abuse who presents from [facility] living for confusion and a fall. UTI +( IV Rocephin)  increase of falls over last 3 months. Psychiatry consulted by Dr Oswaldo Tena due to \"agitation and behavioral problems. Worsening dementia. Has prolonged QTC. \"    Per Nursing notes- Patient got oob today and threw his walker at the sitter. [facility] 2016- Is pt able to have something for restleness? The trazodone did not help. Last night, hospitalist ordered 1x dose of Seroquel, it did not work. Also 1x dose of Zyprexa did not work as well. Are we able to try melatonin or ativan? Read: 2117 2118: I ordered melatonin     2358: pt has become extremely aggressive/confused. Read: 0408     1200: I ordered Zyprexa     0358: pt has become extremely aggressive, he threw his walker at sitter. We have done everything we can for this pt including bath and redirection Read 0118 8064: It is risky to give him more meds as his QTC was prolonged on admission. If we can get ECG (not sure if it is possible given his agitation) then we can have updated QTC. If he is dangerous to himself/others would have to restrain    EKG 1/27/23- QTC elevated at 517      Met with patient at bedside, as well as sitter and patient's daughter \"Luana\" Patient is sitting quietly and conversing in a disorganized manner, restless and pulling at vance. He is alert and oriented to name, month and year. He is disoriented to hospital and situation, though after some time he was able to place provider in the hospital. Pt is limited in response at this time.   Pt does not display any homicidal or suicidal ideations or auditory or visual hallucinations at the moment, but per staff he has been agitated and angry requiring calming medications. Per his daughter he has been telling her about the \"blue field\" he saw as well as \"dalmatian puppies\" this am. She also notes that he becomes agitated around 4 pm and will get calls from [facility] or hospital.  He has not been sleeping at [facility] and trazodone was a recent trial.         CT SCAN HEAD: 1/27/23       BRAIN/VENTRICLES: There is no acute intracranial hemorrhage, mass effect or   midline shift. No abnormal extra-axial fluid collection. The gray-white   differentiation is maintained without evidence of an acute infarct. There is   no evidence of hydrocephalus. The cerebral sulci and ventricles are enlarged   compatible with diffuse cerebral atrophy. There is low-attenuation in the   periventricular white matter and deep white matter of the brain representing   changes of chronic small vessel ischemic disease. ORBITS: The visualized portion of the orbits demonstrate no acute abnormality. SINUSES: The visualized paranasal sinuses and mastoid air cells demonstrate   no acute abnormality. There is mucosal disease involving the maxillary and   ethmoid sinuses. SOFT TISSUES/SKULL:  No acute abnormality involving the visualized skull or   soft tissues. Past Psychiatric History:   Patient recently dx with Major neurocognitive disorder and was moved to [facility] as he could no longer live alone. Substance Use history    Social History  He was /. They had David Cancer and a younger son. 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 Patient recently dx with Major neurocognitive disorder and was moved to [facility] as he could no longer live alone. Family Psychiatric History:   History reviewed. No pertinent family history.      Allergies:  No Known Allergies     OBJECTIVE  Vital Signs:  Vitals:    01/29/23 2350   BP: 114/79   Pulse:    Resp: 18   Temp: 97.9 °F (36.6 °C)   SpO2:        Labs:  No results found for this or any previous visit (from the past 48 hour(s)). Review of Systems:  Reports of no current cardiovascular, respiratory, gastrointestinal, genitourinary, integumentary, neurological, muscuoskeletal, or immunological symptoms today. PSYCHIATRIC: See HPI above. PSYCHIATRIC EXAMINATION / MENTAL STATUS EXAM    CONSTITUTIONAL:    Vitals:   Vitals:    01/29/23 2350   BP: 114/79   Pulse:    Resp: 18   Temp: 97.9 °F (36.6 °C)   SpO2:       General appearance: [x] appears age, []  appears older than stated age,               [x]  adequately dressed and groomed, [] disheveled,               [x]  in no acute distress, [] appears mildly distressed, [] other           MUSCULOSKELETAL:   Gait:   [] normal, [] antalgic, [] unsteady, [] gait not evaluated   Station:             [] erect, [x] sitting, [] recumbent, [] other        Strength/tone:  [x] muscle strength and tone appear consistent with age and                                        condition     [] atrophy      [] abnormal movements     Vitals: Blood pressure 114/79, pulse 74, temperature 97.9 °F (36.6 °C), temperature source Oral, resp. rate 18, height 6' 1.5\" (1.867 m), weight 260 lb (117.9 kg), SpO2 97 %. CONSTITUTIONAL:    Appearance: appears stated age. alert and oriented to person,  time disoriented to place & situation. no acute distress. Adequate grooming and hygeine. Fair eye contact. No prominent physical abnormalities. Attitude: Manner is cooperative and pleasant but confused  Motor: Noted psychomotor agitation- restless and difficulty staying away from vance, no retardation or abnormal movements noted  Speech: Not always Clearly articulated; normal rate, volume, tone & amount.   Language: intact understanding and production  Mood: \"fine\"  Affect: flat  non-labile, congruent with mood and content of speech  Thought Production: Spontaneous. Thought Form: Coherent, not always linear, logical or goal-directed. No tangentiality or circumstantiality. Noted flight of ideas or loosening of associations. Thought Content/Perceptions: No PADMA, no AVH, no delusion  Insight: impaired  Judgment- impaired  Memory: Immediate, recent, and remote appear impaired, though not formally tested. 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  Other sequelae of cerebrovascular disease    Problem List:   Urinary tract infection    Plan:  Standard Delirium precautions:  Redirect / reorient / reassure the patient  o Early mobilization (please allow patient to walk halls with assistance if needed)   o Reduce noise and provide adequate daytime light in the room; eula-side room preferable if available  o Prevent sleep deprivation  o Minimize use of anticholinergic drugs, benzodiazepines, and narcotics  o Use of eyeglasses and hearing aids  o Treat volume depletion  o Bowel regimens  o Avoid lines or catheters if possible  o Monitor for unintentional self-harm  o Assess fall risk    Recommend depakote sprinkles 125 mg po at 1500 to target agitation  Recommend depakote sprinkles 500 mg po at bedtime for agitation and insomnia- goal is not to sedate. If too sedated please decrease dose in half  Would like to start lexapro 5 mg po daily but not until QTC has decreased. for ongoing anxiety and possible depression   Hold trazodone at this time due to qtc elevation  Recommend continued monitoring of QTC which is currently 537. Please avoid antipsychotics  or other QTC prolonging medications until <500.    If agitated would recommend depakote sprinkles 125 mg po q8  hours    Electronically signed by KALI Howe CNP on 1/30/2023 at 10:26 AM

## 2023-01-30 NOTE — PROGRESS NOTES
2117 [phone] From [address] [place] [address] Routine RE: [facility] 2016- Is pt able to have something for restleness? The trazodone did not help. Last night, hospitalist ordered 1x dose of Seroquel, it did not work. Also 1x dose of Zyprexa did not work as well. Are we able to try melatonin or ativan? Read: 2117 2118: I ordered melatonin    2358: pt has become extremely aggressive/confused. Read: 9942    1200: I ordered Zyprexa    0358: pt has become extremely aggressive, he threw his walker at sitter. We have done everything we can for this pt including bath and redirection Read 0352 7494: It is risky to give him more meds as his QTC was prolonged on admission. If we can get ECG (not sure if it is possible given his agitation) then we can have updated QTC.  If he is dangerous to himself/others would have to restrain

## 2023-01-30 NOTE — DISCHARGE INSTR - COC
Continuity of Care Form    Patient Name: Chauncey Juares   :  1940  MRN:  [id number]    Admit date:  2023  Discharge date:  2022    Code Status Order: Full Code   Advance Directives:     Admitting Physician:  Gianna Rdz MD  PCP: Lacho Kasper    Discharging Nurse: [facility] Unit/Room#: -A  Discharging Unit Phone Number: [phone]    Emergency Contact:   Extended Emergency Contact Information  Primary Emergency Contact: Nadya Cook Phone: [phone]  Relation: Child  Preferred language: English  Secondary Emergency Contact: Onesimo Polanco [address] Phone: [phone]  Relation: Other  Preferred language: English    Past Surgical History:  History reviewed. No pertinent surgical history. Immunization History: There is no immunization history on file for this patient. Active Problems:  Patient Active Problem List   Diagnosis Code    Urinary tract infection N39.0       Isolation/Infection:   Isolation            No Isolation          Patient Infection Status       None to display            Nurse Assessment:  Last Vital Signs: /79   Pulse 74   Temp 97.9 °F (36.6 °C) (Oral)   Resp 18   Ht 6' 1.5\" (1.867 m)   Wt 260 lb (117.9 kg)   SpO2 97%   BMI 33.84 kg/m²     Last documented pain score (0-10 scale): Pain Level: 0  Last Weight:   Wt Readings from Last 1 Encounters:   23 260 lb (117.9 kg)     Mental Status:  disoriented and alert    IV Access:  - None    Nursing Mobility/ADLs:  Walking   Assisted  Transfer  Assisted  Bathing  Assisted  Dressing  Assisted  Toileting  Assisted  Feeding  Independent  Med Admin  Assisted  Med Delivery   whole    Wound Care Documentation and Therapy:        Elimination:  Continence:    Bowel: Yes  Bladder: Yes  Urinary Catheter: None   Colostomy/Ileostomy/Ileal Conduit: No       Date of Last BM:2023        Intake/Output Summary (Last 24 hours) at 2023 1020  Last data filed at 1/30/2023 0801  Gross per 24 hour   Intake 320 ml   Output 600 ml   Net -280 ml     I/O last 3 completed shifts:  In: -   Out: 1600 [Urine:1600]    Safety Concerns: At Risk for Falls    Impairments/Disabilities:      Hearing      Patient's personal belongings (please select all that are sent with patient):  Glasses, Dentures upper and lower    RN SIGNATURE:  Electronically signed by Reid Olivares RN on 2/2/23 at 2:29 PM EST    CASE MANAGEMENT/SOCIAL WORK SECTION    Inpatient Status Date: ***    Readmission Risk Assessment Score:  Readmission Risk              Risk of Unplanned Readmission:  14           Discharging to Facility/ Agency   Name: [facility]   Address:  [facility]:[phone]  Fax:[phone]    / signature: Electronically signed by Kadie Weiss RN on 2/2/23 at 2:39 PM EST    PHYSICIAN SECTION    Prognosis: Good    Condition at Discharge: Stable    Rehab Potential (if transferring to Rehab): Good    Nutrition Therapy:  Current Nutrition Therapy:   - Oral Diet:  General    Routes of Feeding: Oral  Liquids: Thin Liquids  Daily Fluid Restriction: no  Last Modified Barium Swallow with Video (Video Swallowing Test): not done    Treatments at the Time of Hospital Discharge:   Respiratory Treatments: none  Oxygen Therapy:  is not on home oxygen therapy. Ventilator:    - No ventilator support    Rehab Therapies: Physical Therapy and Occupational Therapy  Weight Bearing Status/Restrictions: No weight bearing restrictions  Other Medical Equipment (for information only, NOT a DME order):  wheelchair, cane, walker, bath bench, bedside commode, and hospital bed  Other Treatments: none    Recommended Labs or Other Treatments After Discharge: none    Physician Certification: I certify the above information and transfer of Kaye Aguilar  is necessary for the continuing treatment of the diagnosis listed and that he requires [facility] for greater 30 days.      Update Admission H&P: No change in H&P    PHYSICIAN SIGNATURE:  Electronically signed by Nate Birch MD on 2/2/23 at 12:40 PM EST

## 2023-01-30 NOTE — PROGRESS NOTES
Occupational Therapy      Occupational Therapy Treatment Note    Name: Vinnie Gold MRN: [id number] :   1940   Date:  2023   Admission Date: 2023 Room:  -A     Primary Problem:  UTI    Restrictions/Precautions:  Restrictions/Precautions  Restrictions/Precautions: General Precautions, Fall Risk       Sitter    Communication with other providers: Nursing handoff, Sitter    Subjective:  Patient states:  Pt requiring mod re-direction to conversation/task  Pain:   Location, Type, Intensity (0/10 to 10/10):  No    Objective:    Observation: Pt received supine in bed, sitter in room, agreeable to therapy   Objective Measures:  WFL    Treatment, including education:  Self Care Training:   Cues were given for safety, sequence, UE/LE placement, visual cues, and balance. Activities performed today included grooming    Therapeutic Activity Training:   Therapeutic activity training was instructed today. Cues were given for safety, sequence, UE/LE placement, awareness, and balance. Activities performed today included bed mobility training, sup-sit, sit-stand, stand to sit, sit to supine, scooting to HOB, rolling L/R    Supine to sit modA, sitting EOB CGA, STS from EOB up to stand modA, in stand ~30 seconds, stand to sit Sumaya. STS from EOB up to stand modA, in stand ~1 minute, becoming nervous/agitated difficulty following directions when nervous,stand to sit modA. Sit to supine Sumaya, scooting to HOB modA x 2, rolling L/R SBA    Pt supine in bed, bed alarm on, call light at side, sitter in room, nursing notified.        Assessment / Impression:    Patient's tolerance of treatment: Well  Adverse Reaction: None  Significant change in status and impact: Improved from initial evaluation  Barriers to improvement: None noted      Plan for Next Session:    Continue OT POC    Time in:  1345  Time out:  1358  Timed treatment minutes:  13 minutes  Total treatment time:  13 minutes      Electronically signed by:    Shweta MCKENZIE/ROLANDO [id number]  2:17 PM,1/30/2023

## 2023-01-30 NOTE — PLAN OF CARE
Problem: Discharge Planning  Goal: Discharge to home or other facility with appropriate resources  Outcome: Progressing     Problem: Pain  Goal: Verbalizes/displays adequate comfort level or baseline comfort level  Outcome: Progressing     Problem: Confusion  Goal: Confusion, delirium, dementia, or psychosis is improved or at baseline  Description: INTERVENTIONS:  1. Assess for possible contributors to thought disturbance, including medications, impaired vision or hearing, underlying metabolic abnormalities, dehydration, psychiatric diagnoses, and notify attending LIP  2. [place] high risk fall precautions, as indicated  3. Provide frequent short contacts to provide reality reorientation, refocusing and direction  4. Decrease environmental stimuli, including noise as appropriate  5. Monitor and intervene to maintain adequate nutrition, hydration, elimination, sleep and activity  6. If unable to ensure safety without constant attention obtain sitter and review sitter guidelines with assigned personnel  7.  Initiate Psychosocial CNS and Spiritual Care consult, as indicated  Outcome: Progressing

## 2023-01-30 NOTE — CARE COORDINATION
Chart reviewed. Patient is from the [facility] campus. Will reach out to [facility] admissions for return needs. [address]; spoke to [facility]. Patient is from [address] [place] [address] Road. He is usually A/O x2; some issues with confusion. He is very [place] and has visual issues. Patient has a walker and handicap accessible living conditions. If SNF level is warranted- precert needed.  Tamar Mendes RN

## 2023-01-30 NOTE — PLAN OF CARE
Problem: Skin/Tissue Integrity  Goal: Absence of new skin breakdown  Description: 1. Monitor for areas of redness and/or skin breakdown  2. Assess vascular access sites hourly  3. Every 4-6 hours minimum:  Change oxygen saturation probe site  4. Every 4-6 hours:  If on nasal continuous positive airway pressure, respiratory therapy assess nares and determine need for appliance change or resting period.   Outcome: Progressing       Problem: Safety - Adult  Goal: Free from fall injury  Outcome: Progressing       Problem: Musculoskeletal - Adult  Goal: Return mobility to safest level of function  Outcome: Progressing

## 2023-01-30 NOTE — PROGRESS NOTES
V2.0  [facility] Hospitalist Progress Note      Name:  Landy Peña /Age/Sex: 1940  (80 y.o. male)   MRN & CSN:  [id number] & [id number] Encounter Date/Time: 2023 2:03 PM EST    Location:  -A PCP: Nikolay Omer Day: 4    Assessment and Plan:   Landy Peña is a 80 y.o. male with pmh of CAD s/p CABG , persistent atrial fibrillation s/p cardioversion on Eliquis, type 2 diabetes, essential hypertension presenting from assisted living due to mechanical fall and confusion and found to have UTI      Plan:    Acute encephalopathy  Mechanical fall  Likely multifactorial including urinary tract infection on top of his known dementia. CT head was okay. Treat UTI  Psychiatry consulted  Patient to be started on Depakote  Patient has a prolonged QTC. Avoid QTC prolonging agents. Please do not give spot doses of antipsychotics    Urinary tract infection  Patient with UA consistent with infection. Continue Rocephin  Follow-up urinary culture  Follow-up blood culture    JOSH patient with urinary retention  Postobstructive in nature from urinary retention creatinine elevated presentation 1.4. Patient does have history of BPH  Wall catheter in place  Consult urology  Improved with IV fluids  Avoid nephrotoxic agents    Persistent A. fib  Patient had previously undergone a cardioversion. continue Eliquis    Hypotension  In the setting of infection. Low normal BP  The hold antihypertensive medications    Diet ADULT DIET; Regular;  Low Sodium (2 gm)   DVT Prophylaxis [] Lovenox, []  Heparin, [] SCDs, [] Ambulation,    [x] Eliquis, [] Xarelto  [] Coumadin [] other   Code Status Full Code   Disposition From: Home  Expected Disposition: Home  Estimated Date of Discharge: 1 to 3 days  Patient requires continued admission due to UTI and altered mental status   Surrogate Decision Maker/ POA      Subjective:     Chief Complaint: Fall       Patient does remain confused and was combative the night prior. Per the patient's family, he has been not quite himself for the past week and this may align timeline wise with his UTI. Today he was seen by psychiatry and was started on Depakote. Review of Systems:    Review of Systems   Constitutional:  Negative for activity change, appetite change, chills, fatigue and fever. HENT:  Negative for congestion, hearing loss, sinus pressure, sinus pain, sore throat, trouble swallowing and voice change. Eyes:  Negative for visual disturbance. Respiratory:  Negative for cough, chest tightness, shortness of breath and wheezing. Cardiovascular:  Negative for chest pain, palpitations and leg swelling. Gastrointestinal:  Negative for abdominal pain, constipation, diarrhea, nausea and vomiting. Endocrine: Negative for cold intolerance, heat intolerance and polyuria. Genitourinary:  Negative for dysuria. Musculoskeletal:  Negative for arthralgias, back pain and gait problem. Skin:  Negative for color change. Neurological:  Negative for dizziness, weakness and headaches. Psychiatric/Behavioral:  Positive for confusion. Negative for agitation. The patient is not nervous/anxious. Objective: Intake/Output Summary (Last 24 hours) at 1/30/2023 1403  Last data filed at 1/30/2023 0801  Gross per 24 hour   Intake 320 ml   Output 600 ml   Net -280 ml        Vitals:   Vitals:    01/29/23 2350   BP: 114/79   Pulse:    Resp: 18   Temp: 97.9 °F (36.6 °C)   SpO2:        Physical Exam:     Physical Exam  Constitutional:       General: He is not in acute distress. Appearance: Normal appearance. HENT:      Head: Normocephalic and atraumatic. Nose: Nose normal.      Mouth/Throat:      Mouth: Mucous membranes are moist.      Pharynx: Oropharynx is clear. Eyes:      Extraocular Movements: Extraocular movements intact. Conjunctiva/sclera: Conjunctivae normal.      Pupils: Pupils are equal, round, and reactive to light.    Cardiovascular: Rate and Rhythm: Normal rate and regular rhythm.      Pulses: Normal pulses.      Heart sounds: Normal heart sounds.   Pulmonary:      Effort: Pulmonary effort is normal.   Abdominal:      General: Abdomen is flat. Bowel sounds are normal.      Palpations: Abdomen is soft.   Musculoskeletal:         General: Normal range of motion.      Cervical back: Normal range of motion and neck supple.   Skin:     General: Skin is warm and dry.   Neurological:      General: No focal deficit present.      Mental Status: He is alert and oriented to person, place, and time. Mental status is at baseline.   Psychiatric:         Mood and Affect: Mood normal.         Behavior: Behavior normal.         Thought Content: Thought content normal.       Medications:   Medications:    divalproex  125 mg Oral Daily    divalproex  500 mg Oral Nightly    sodium chloride flush  5-40 mL IntraVENous 2 times per day    cefTRIAXone (ROCEPHIN) IV  1,000 mg IntraVENous Q24H    apixaban  5 mg Oral BID    atorvastatin  40 mg Oral Nightly    [Held by provider] mirtazapine  7.5 mg Oral Nightly    pantoprazole  40 mg Oral QAM AC    tamsulosin  0.4 mg Oral Nightly    [Held by provider] traZODone  50 mg Oral Daily    neomycin-bacitracin-polymyxin   Topical BID      Infusions:    sodium chloride 5 mL/hr at 01/28/23 1800    dextrose       PRN Meds: divalproex, 125 mg, Q8H PRN  melatonin, 6 mg, Nightly PRN  docusate sodium, 100 mg, BID PRN  sodium chloride flush, 5-40 mL, PRN  sodium chloride, , PRN  ondansetron, 4 mg, Q8H PRN   Or  ondansetron, 4 mg, Q6H PRN  polyethylene glycol, 17 g, Daily PRN  acetaminophen, 650 mg, Q6H PRN   Or  acetaminophen, 650 mg, Q6H PRN  glucose, 4 tablet, PRN  dextrose bolus, 125 mL, PRN   Or  dextrose bolus, 250 mL, PRN  glucagon (rDNA), 1 mg, PRN  dextrose, , Continuous PRN        Labs      Recent Results (from the past 24 hour(s))   EKG 12 Lead    Collection Time: 01/30/23 11:37 AM   Result Value Ref Range    Ventricular Rate 69  BPM    Atrial Rate 69 BPM    P-R Interval 200 ms    QRS Duration 166 ms    Q-T Interval 502 ms    QTc Calculation (Bazett) 537 ms    P Axis 13 degrees    R Axis -67 degrees    T Axis 2 degrees    Diagnosis       Sinus rhythm with premature supraventricular complexes  Left axis deviation  Right bundle branch block  Abnormal ECG  When compared with ECG of 27-JAN-2023 09:04,  premature supraventricular complexes are now present  T wave inversion now evident in Inferior leads  Inverted T waves have replaced nonspecific T wave abnormality in Anterior leads          Imaging/Diagnostics Last 24 Hours   No results found. Comment: Please note this report has been produced using speech recognition software and may contain errors related to that system including errors in grammar, punctuation, and spelling, as well as words and phrases that may be inappropriate. If there are any questions or concerns please feel free to contact the dictating provider for clarification.      Electronically signed by Fatuma Maria MD on 1/30/2023 at 2:03 PM

## 2023-01-30 NOTE — PROGRESS NOTES
Pt very aggressive  He jumped out of bed and threw his walker at [facility]  Hospitalist notified

## 2023-01-31 LAB
ANION GAP SERPL CALCULATED.3IONS-SCNC: 8 MMOL/L (ref 4–16)
BASOPHILS ABSOLUTE: 0 K/CU MM
BASOPHILS RELATIVE PERCENT: 0.3 % (ref 0–1)
BUN BLDV-MCNC: 15 MG/DL (ref 6–23)
CALCIUM SERPL-MCNC: 7.7 MG/DL (ref 8.3–10.6)
CHLORIDE BLD-SCNC: 106 MMOL/L (ref 99–110)
CO2: 23 MMOL/L (ref 21–32)
CREAT SERPL-MCNC: 1.1 MG/DL (ref 0.9–1.3)
DIFFERENTIAL TYPE: ABNORMAL
EKG ATRIAL RATE: 69 BPM
EKG DIAGNOSIS: NORMAL
EKG P AXIS: 13 DEGREES
EKG P-R INTERVAL: 200 MS
EKG Q-T INTERVAL: 502 MS
EKG QRS DURATION: 166 MS
EKG QTC CALCULATION (BAZETT): 537 MS
EKG R AXIS: -67 DEGREES
EKG T AXIS: 2 DEGREES
EKG VENTRICULAR RATE: 69 BPM
EOSINOPHILS ABSOLUTE: 0.1 K/CU MM
EOSINOPHILS RELATIVE PERCENT: 2 % (ref 0–3)
GFR SERPL CREATININE-BSD FRML MDRD: >60 ML/MIN/1.73M2
GLUCOSE BLD-MCNC: 117 MG/DL (ref 70–99)
HCT VFR BLD CALC: 40.1 % (ref 42–52)
HEMOGLOBIN: 12.8 GM/DL (ref 13.5–18)
IMMATURE NEUTROPHIL %: 0.4 % (ref 0–0.43)
LYMPHOCYTES ABSOLUTE: 1.2 K/CU MM
LYMPHOCYTES RELATIVE PERCENT: 16.8 % (ref 24–44)
MCH RBC QN AUTO: 31.5 PG (ref 27–31)
MCHC RBC AUTO-ENTMCNC: 31.9 % (ref 32–36)
MCV RBC AUTO: 98.8 FL (ref 78–100)
MONOCYTES ABSOLUTE: 0.8 K/CU MM
MONOCYTES RELATIVE PERCENT: 11.3 % (ref 0–4)
NUCLEATED RBC %: 0 %
PDW BLD-RTO: 15 % (ref 11.7–14.9)
PLATELET # BLD: 146 K/CU MM (ref 140–440)
PMV BLD AUTO: 9.4 FL (ref 7.5–11.1)
POTASSIUM SERPL-SCNC: 4.1 MMOL/L (ref 3.5–5.1)
RBC # BLD: 4.06 M/CU MM (ref 4.6–6.2)
SEGMENTED NEUTROPHILS ABSOLUTE COUNT: 5 K/CU MM
SEGMENTED NEUTROPHILS RELATIVE PERCENT: 69.2 % (ref 36–66)
SODIUM BLD-SCNC: 137 MMOL/L (ref 135–145)
TOTAL IMMATURE NEUTOROPHIL: 0.03 K/CU MM
TOTAL NUCLEATED RBC: 0 K/CU MM
WBC # BLD: 7.2 K/CU MM (ref 4–10.5)

## 2023-01-31 PROCEDURE — 36415 COLL VENOUS BLD VENIPUNCTURE: CPT

## 2023-01-31 PROCEDURE — 85025 COMPLETE CBC W/AUTO DIFF WBC: CPT

## 2023-01-31 PROCEDURE — 6370000000 HC RX 637 (ALT 250 FOR IP): Performed by: NURSE PRACTITIONER

## 2023-01-31 PROCEDURE — 80048 BASIC METABOLIC PNL TOTAL CA: CPT

## 2023-01-31 PROCEDURE — 2580000003 HC RX 258: Performed by: NURSE PRACTITIONER

## 2023-01-31 PROCEDURE — 2060000000 HC ICU INTERMEDIATE R&B

## 2023-01-31 PROCEDURE — 2580000003 HC RX 258: Performed by: INTERNAL MEDICINE

## 2023-01-31 PROCEDURE — 6360000002 HC RX W HCPCS: Performed by: NURSE PRACTITIONER

## 2023-01-31 PROCEDURE — 6370000000 HC RX 637 (ALT 250 FOR IP): Performed by: STUDENT IN AN ORGANIZED HEALTH CARE EDUCATION/TRAINING PROGRAM

## 2023-01-31 PROCEDURE — 94761 N-INVAS EAR/PLS OXIMETRY MLT: CPT

## 2023-01-31 PROCEDURE — 93010 ELECTROCARDIOGRAM REPORT: CPT | Performed by: INTERNAL MEDICINE

## 2023-01-31 RX ORDER — SODIUM CHLORIDE, SODIUM LACTATE, POTASSIUM CHLORIDE, CALCIUM CHLORIDE 600; 310; 30; 20 MG/100ML; MG/100ML; MG/100ML; MG/100ML
INJECTION, SOLUTION INTRAVENOUS CONTINUOUS
Status: DISPENSED | OUTPATIENT
Start: 2023-01-31 | End: 2023-01-31

## 2023-01-31 RX ADMIN — Medication 6 MG: at 21:06

## 2023-01-31 RX ADMIN — ACETAMINOPHEN 650 MG: 325 TABLET ORAL at 21:06

## 2023-01-31 RX ADMIN — BACITRACIN, NEOMYCIN, POLYMYXIN B: 400; 3.5; 5 OINTMENT TOPICAL at 11:12

## 2023-01-31 RX ADMIN — ACETAMINOPHEN 650 MG: 325 TABLET ORAL at 11:10

## 2023-01-31 RX ADMIN — BACITRACIN, NEOMYCIN, POLYMYXIN B: 400; 3.5; 5 OINTMENT TOPICAL at 21:07

## 2023-01-31 RX ADMIN — DIVALPROEX SODIUM 500 MG: 500 TABLET, DELAYED RELEASE ORAL at 21:07

## 2023-01-31 RX ADMIN — APIXABAN 5 MG: 5 TABLET, FILM COATED ORAL at 21:07

## 2023-01-31 RX ADMIN — Medication 10 ML: at 11:11

## 2023-01-31 RX ADMIN — ATORVASTATIN CALCIUM 40 MG: 40 TABLET, FILM COATED ORAL at 21:07

## 2023-01-31 RX ADMIN — PANTOPRAZOLE SODIUM 40 MG: 40 TABLET, DELAYED RELEASE ORAL at 06:03

## 2023-01-31 RX ADMIN — CEFTRIAXONE 1000 MG: 1 INJECTION, POWDER, FOR SOLUTION INTRAMUSCULAR; INTRAVENOUS at 18:58

## 2023-01-31 RX ADMIN — Medication 10 ML: at 21:07

## 2023-01-31 RX ADMIN — APIXABAN 5 MG: 5 TABLET, FILM COATED ORAL at 11:11

## 2023-01-31 RX ADMIN — DIVALPROEX SODIUM 125 MG: 125 CAPSULE, COATED PELLETS ORAL at 15:39

## 2023-01-31 RX ADMIN — TAMSULOSIN HYDROCHLORIDE 0.4 MG: 0.4 CAPSULE ORAL at 21:07

## 2023-01-31 RX ADMIN — ACETAMINOPHEN 650 MG: 325 TABLET ORAL at 02:21

## 2023-01-31 RX ADMIN — SODIUM CHLORIDE, POTASSIUM CHLORIDE, SODIUM LACTATE AND CALCIUM CHLORIDE: 600; 310; 30; 20 INJECTION, SOLUTION INTRAVENOUS at 13:23

## 2023-01-31 ASSESSMENT — PAIN DESCRIPTION - PAIN TYPE: TYPE: ACUTE PAIN

## 2023-01-31 ASSESSMENT — PAIN SCALES - GENERAL
PAINLEVEL_OUTOF10: 3
PAINLEVEL_OUTOF10: 3
PAINLEVEL_OUTOF10: 0
PAINLEVEL_OUTOF10: 3
PAINLEVEL_OUTOF10: 3

## 2023-01-31 ASSESSMENT — PAIN DESCRIPTION - ORIENTATION
ORIENTATION: RIGHT
ORIENTATION: RIGHT

## 2023-01-31 ASSESSMENT — PAIN DESCRIPTION - DESCRIPTORS
DESCRIPTORS: ACHING

## 2023-01-31 ASSESSMENT — PAIN DESCRIPTION - LOCATION
LOCATION: GENERALIZED
LOCATION: GROIN
LOCATION: LEG
LOCATION: LEG

## 2023-01-31 ASSESSMENT — PAIN DESCRIPTION - FREQUENCY: FREQUENCY: INTERMITTENT

## 2023-01-31 ASSESSMENT — PAIN - FUNCTIONAL ASSESSMENT
PAIN_FUNCTIONAL_ASSESSMENT: ACTIVITIES ARE NOT PREVENTED

## 2023-01-31 ASSESSMENT — PAIN DESCRIPTION - ONSET: ONSET: AWAKENED FROM SLEEP

## 2023-01-31 NOTE — PROGRESS NOTES
Occupational Therapy  . Occupational Therapy Treatment Note    Name: Cletus Kocher MRN: [id number] :   1940   Date:  2023   Admission Date: 2023 Room:  -A       Att x1- 925- patien resting peacfully with sitter. Just fell asleep. Att x2- 1310. Patient with very Low BP at this time. Per RN would be better to check back at later time.        Electronically signed by:    SIMEON Chappell COTA/L [id number]    2023, 4:23 PM

## 2023-01-31 NOTE — PROGRESS NOTES
V2.0  [facility] Hospitalist Progress Note      Name:  Bernice Avila /Age/Sex: 1940  (80 y.o. male)   MRN & CSN:  [id number] & [id number] Encounter Date/Time: 2023 2:03 PM EST    Location:  -A PCP: Lito Patel Day: 5    Assessment and Plan:   Bernice Avila is a 80 y.o. male with pmh of CAD s/p CABG , persistent atrial fibrillation s/p cardioversion on Eliquis, type 2 diabetes, essential hypertension presenting from assisted living due to mechanical fall and confusion and found to have UTI    Acute encephalopathy  Mechanical fall  Likely multifactorial including urinary tract infection on top of his known dementia. CT head was okay. Treat UTI  Psychiatry consulted  Patient to be started on Depakote  Patient has a prolonged QTC. Avoid QTC prolonging agents. Please do not give spot doses of antipsychotics    Urinary tract infection  Patient with UA consistent with infection. Continue Rocephin  Follow-up urinary culture  Follow-up blood culture--> no growth in 4 days    JOSH patient with urinary retention (JOSH resolved)  Postobstructive in nature from urinary retention creatinine elevated presentation 1.4. Patient does have history of BPH  Wall catheter in place  Consult urology  Improved with IV fluids  Avoid nephrotoxic agents  Voiding trial    Persistent A. fib  Patient had previously undergone a cardioversion. continue Eliquis    Hypotension  In the setting of infection. Low normal BP  The hold antihypertensive medications    Diet ADULT DIET; Regular;  Low Sodium (2 gm)   DVT Prophylaxis [] Lovenox, []  Heparin, [] SCDs, [] Ambulation,    [x] Eliquis, [] Xarelto  [] Coumadin [] other   Code Status Full Code   Disposition From: Home  Expected Disposition: Home  Estimated Date of Discharge: 1 to 3 days  Patient requires continued admission due to UTI and altered mental status   Surrogate Decision Maker/ POA      Subjective:     Chief Complaint: Fall     Patient AAOx4 this morning. Complaining of L knee pain. Objective:      Intake/Output Summary (Last 24 hours) at 1/31/2023 1347  Last data filed at 1/31/2023 0026  Gross per 24 hour   Intake 931.03 ml   Output 1050 ml   Net -118.97 ml          Vitals:   Vitals:    01/31/23 1244   BP:    Pulse: 65   Resp:    Temp:    SpO2:        Physical Exam:     General: NAD, AAO x3-4  Eyes: EOMI  HENT: Atraumatic, large laceration on R side of head  CVS: Normal S1 and S2, no murmurs, RRR  Respiratory: Normal breath sounds, clear to auscultation bilaterally, no respiratory distress  GI: Normal bowel sounds, soft, nondistended, nontender  MSK: no lower extremity edema, bruising on L knee  Skin: Intact, dry, warm, no rashes  Neuro: CN II to XII grossly intact  Psych: Normal mood    Medications:   Medications:    divalproex  125 mg Oral Daily    divalproex  500 mg Oral Nightly    sodium chloride flush  5-40 mL IntraVENous 2 times per day    cefTRIAXone (ROCEPHIN) IV  1,000 mg IntraVENous Q24H    apixaban  5 mg Oral BID    atorvastatin  40 mg Oral Nightly    [Held by provider] mirtazapine  7.5 mg Oral Nightly    pantoprazole  40 mg Oral QAM AC    tamsulosin  0.4 mg Oral Nightly    [Held by provider] traZODone  50 mg Oral Daily    neomycin-bacitracin-polymyxin   Topical BID      Infusions:    lactated ringers IV soln 500 mL/hr at 01/31/23 1323    sodium chloride Stopped (01/1940)    dextrose       PRN Meds: divalproex, 125 mg, Q8H PRN  melatonin, 6 mg, Nightly PRN  docusate sodium, 100 mg, BID PRN  sodium chloride flush, 5-40 mL, PRN  sodium chloride, , PRN  ondansetron, 4 mg, Q8H PRN   Or  ondansetron, 4 mg, Q6H PRN  polyethylene glycol, 17 g, Daily PRN  acetaminophen, 650 mg, Q6H PRN   Or  acetaminophen, 650 mg, Q6H PRN  glucose, 4 tablet, PRN  dextrose bolus, 125 mL, PRN   Or  dextrose bolus, 250 mL, PRN  glucagon (rDNA), 1 mg, PRN  dextrose, , Continuous PRN      Labs      Recent Results (from the past 24 hour(s))   Basic Metabolic Panel w/ Reflex to MG    Collection Time: 01/31/23  3:40 AM   Result Value Ref Range    Sodium 137 135 - 145 MMOL/L    Potassium 4.1 3.5 - 5.1 MMOL/L    Chloride 106 99 - 110 mMol/L    CO2 23 21 - 32 MMOL/L    Anion Gap 8 4 - 16    BUN 15 6 - 23 MG/DL    Creatinine 1.1 0.9 - 1.3 MG/DL    Est, Glom Filt Rate >60 >60 mL/min/1.73m2    Glucose 117 (H) 70 - 99 MG/DL    Calcium 7.7 (L) 8.3 - 10.6 MG/DL   CBC with Auto Differential    Collection Time: 01/31/23  3:40 AM   Result Value Ref Range    WBC 7.2 4.0 - 10.5 K/CU MM    RBC 4.06 (L) 4.6 - 6.2 M/CU MM    Hemoglobin 12.8 (L) 13.5 - 18.0 GM/DL    Hematocrit 40.1 (L) 42 - 52 %    MCV 98.8 78 - 100 FL    MCH 31.5 (H) 27 - 31 PG    MCHC 31.9 (L) 32.0 - 36.0 %    RDW 15.0 (H) 11.7 - 14.9 %    Platelets 183 419 - 158 K/CU MM    MPV 9.4 7.5 - 11.1 FL    Differential Type AUTOMATED DIFFERENTIAL     Segs Relative 69.2 (H) 36 - 66 %    Lymphocytes % 16.8 (L) 24 - 44 %    Monocytes % 11.3 (H) 0 - 4 %    Eosinophils % 2.0 0 - 3 %    Basophils % 0.3 0 - 1 %    Segs Absolute 5.0 K/CU MM    Lymphocytes Absolute 1.2 K/CU MM    Monocytes Absolute 0.8 K/CU MM    Eosinophils Absolute 0.1 K/CU MM    Basophils Absolute 0.0 K/CU MM    Nucleated RBC % 0.0 %    Total Nucleated RBC 0.0 K/CU MM    Total Immature Neutrophil 0.03 K/CU MM    Immature Neutrophil % 0.4 0 - 0.43 %        Imaging/Diagnostics Last 24 Hours   No results found.       Electronically signed by Maeve Banks MD on 1/31/2023 at 1:47 PM

## 2023-01-31 NOTE — CARE COORDINATION
PT/OT rec SNF. Patient remains confused at times. Will call daughter for input.  Patrick Brothers, RN

## 2023-01-31 NOTE — PROGRESS NOTES
Dr notified: Pt's BP trending down over the last 2 hours, most recent BP 89/63 MAP 72. Prior BP's were at 1002 /62 MAP 84 and at 0902 115/71 MAP 86.

## 2023-01-31 NOTE — CONSULTS
[facility]   Consult Note  [facility] 1 2 3 4 5    Date: 2023   Patient: Ashley Liang   : 1940   DOA: 2023   MRN: [id number]   ROOM#: -[id number]     Reason for Consult: Urinary retention requiring vance placement  Requesting Physician:  Dr. Cesario Montaño  Collaborating Urologist on Call at time of admission: Dr. Cruzito Melo: Fall and head injury    History Obtained From:  patient, electronic medical record    HISTORY OF PRESENT ILLNESS:                The patient is a 80 y.o. male with significant past medical history of CAD s/p CABG , A-fib on Eliquis, CVA, DM2, dementia, BPH, HLD, and HTN who presented from [facility] with a fall from his bed with forehead laceration 23. Work-up revealed concern for UTI and urinary retention (bladder scan with 608cc, for which a vance was placed with bloody urine return). CT a/p did show prostatomegaly with no acute findings. His urine today is clear yellow and pt reports tolerating his catheter well. No difficulty voiding prior to hospital admission. ED Provider's HPI 23: Ashley Liang is a 80 y.o. male who presents via EMS from [facility] assisted living facility. Reportedly patient fell out of bed last night, the anticoagulation for history of A. fib. Transfer report mentioning patient has since been having low blood pressure readings, and increased confusion. EMS reports that patient has been alert and oriented for him, and no reported prolonged downtime, loss of consciousness, nausea vomiting, or speech changes or focal neurodeficits. Past Medical History:        Diagnosis Date    Atrial fibrillation ([address]     Cerebral artery occlusion with cerebral infarction ([address]     Diabetes mellitus ([address]     Hyperlipidemia     Hypertension      Past Surgical History:    History reviewed. No pertinent surgical history.   Current Medications:   Current Facility-Administered Medications: divalproex (DEPAKOTE SPRINKLE) DR capsule 125 mg, 125 mg, Oral, Q8H PRN  divalproex (DEPAKOTE SPRINKLE) DR capsule 125 mg, 125 mg, Oral, Daily  divalproex (DEPAKOTE) DR tablet 500 mg, 500 mg, Oral, Nightly  melatonin tablet 6 mg, 6 mg, Oral, Nightly PRN  docusate sodium (COLACE) capsule 100 mg, 100 mg, Oral, BID PRN  sodium chloride flush 0.9 % injection 5-40 mL, 5-40 mL, IntraVENous, 2 times per day  sodium chloride flush 0.9 % injection 5-40 mL, 5-40 mL, IntraVENous, PRN  0.9 % sodium chloride infusion, , IntraVENous, PRN  ondansetron (ZOFRAN-ODT) disintegrating tablet 4 mg, 4 mg, Oral, Q8H PRN **OR** ondansetron (ZOFRAN) injection 4 mg, 4 mg, IntraVENous, Q6H PRN  polyethylene glycol (GLYCOLAX) packet 17 g, 17 g, Oral, Daily PRN  acetaminophen (TYLENOL) tablet 650 mg, 650 mg, Oral, Q6H PRN **OR** acetaminophen (TYLENOL) suppository 650 mg, 650 mg, Rectal, Q6H PRN  cefTRIAXone (ROCEPHIN) 1,000 mg in sodium chloride 0.9 % 50 mL IVPB (mini-bag), 1,000 mg, IntraVENous, Q24H  apixaban (ELIQUIS) tablet 5 mg, 5 mg, Oral, BID  atorvastatin (LIPITOR) tablet 40 mg, 40 mg, Oral, Nightly  [Held by provider] mirtazapine (REMERON) tablet 7.5 mg, 7.5 mg, Oral, Nightly  pantoprazole (PROTONIX) tablet 40 mg, 40 mg, Oral, QAM AC  tamsulosin (FLOMAX) capsule 0.4 mg, 0.4 mg, Oral, Nightly  [Held by provider] traZODone (DESYREL) tablet 50 mg, 50 mg, Oral, Daily  neomycin-bacitracin-polymyxin (NEOSPORIN) ointment, , Topical, BID  glucose chewable tablet 16 g, 4 tablet, Oral, PRN  dextrose bolus 10% 125 mL, 125 mL, IntraVENous, PRN **OR** dextrose bolus 10% 250 mL, 250 mL, IntraVENous, PRN  glucagon (rDNA) injection 1 mg, 1 mg, SubCUTAneous, PRN  dextrose 10 % infusion, , IntraVENous, Continuous PRN    Allergies:  Patient has no known allergies. Social History:   TOBACCO:   reports that he has quit smoking. His smoking use included cigarettes.  He has quit using smokeless tobacco.  ETOH:   reports that he does not currently use alcohol. DRUGS:   reports no history of drug use. Family History:   History reviewed. No pertinent family history. REVIEW OF SYSTEMS:     CONSTITUTIONAL:  negative  RESPIRATORY:  negative  CARDIOVASCULAR:  negative  GASTROINTESTINAL:  negative  GENITOURINARY:  negative    PHYSICAL EXAM:      VITALS:  /67   Pulse 82   Temp 97.4 °F (36.3 °C) (Oral)   Resp 22   Ht 6' 1.5\" (1.867 m)   Wt 260 lb (117.9 kg)   SpO2 95%   BMI 33.84 kg/m²      TEMPERATURE:  Current - Temp: 97.4 °F (36.3 °C);  Max - Temp  Av.3 °F (36.3 °C)  Min: 97 °F (36.1 °C)  Max: 97.4 °F (36.3 °C)  24HR BLOOD PRESSURE RANGE:  Systolic (75HEW), GTW:148 , Min:104 , XQM:970   ; Diastolic (54UUE), JCF:34, Min:59, Max:98    Physical Exam:  General appearance: alert, appears stated age, cooperative, no distress, mildly obese, and mildly confused  Head: Normocephalic, without obvious abnormality, atraumatic  Back:  No CVA tenderness  Abdomen:  Soft, non-tender, non-distended  : Indwelling catheter with clear yellow urine    DATA:    WBC:    Lab Results   Component Value Date/Time    WBC 7.2 2023 03:40 AM     Hemoglobin/Hematocrit:    Lab Results   Component Value Date/Time    HGB 12.8 2023 03:40 AM    HCT 40.1 2023 03:40 AM     BMP:    Lab Results   Component Value Date/Time     2023 03:40 AM    K 4.1 2023 03:40 AM     2023 03:40 AM    CO2 23 2023 03:40 AM    BUN 15 2023 03:40 AM    LABALBU 3.4 2023 08:47 AM    CREATININE 1.1 2023 03:40 AM    CALCIUM 7.7 2023 03:40 AM    LABGLOM >60 2023 03:40 AM     Blood Culture: NGTD    Imaging:  CT ABDOMEN PELVIS WO CONTRAST Additional Contrast? None    Result Date: 2023  EXAMINATION: CT OF THE ABDOMEN AND PELVIS WITHOUT CONTRAST 2023 1:58 pm TECHNIQUE: CT of the abdomen and pelvis was performed without the administration of intravenous contrast. Multiplanar reformatted images are provided for review. Automated exposure control, iterative reconstruction, and/or weight based adjustment of the mA/kV was utilized to reduce the radiation dose to as low as reasonably achievable. COMPARISON: None. HISTORY: ORDERING SYSTEM PROVIDED HISTORY: JOSH, UTI, confusion; fell of SNF bed last night TECHNOLOGIST PROVIDED HISTORY: Reason for exam:->JOSH, UTI, confusion; fell of SNF bed last night Additional Contrast?->None Decision Support Exception - unselect if not a suspected or confirmed emergency medical condition->Emergency Medical Condition (MA) Reason for Exam: JOSH, UTI, confusion; fell of SNF bed last night Relevant Medical/Surgical History: none FINDINGS: Lower Chest: Lung bases are clear. Coronary artery disease is appreciated. Organs: The solid organs are limited in evaluation due to lack of use of intravenous contrast. Cholelithiasis is identified. Multiple bilateral renal cysts are identified more numerous and larger on the right. No hydronephrosis is identified. GI/Bowel: The bowel is normal in caliber. No free air is identified. The appendix is normal.  Mild diverticulosis is identified without evidence of acute diverticulitis. Pelvis: The urinary bladder is intact. The prostate gland is enlarged and measures 6.5 cm in width. Peritoneum/Retroperitoneum: No lymphadenopathy is appreciated. Moderate aortic atherosclerosis is identified. Bones/Soft Tissues: Right total hip arthroplasty appears uncomplicated. Osteopenia is noted. No acute osseous abnormality is identified. No acute abnormality within the abdomen or pelvis. Numerous bilateral renal cysts without evidence for hydronephrosis. Osteopenia. No gross acute fracture. XR PELVIS (1-2 VIEWS)    Result Date: 1/27/2023  EXAMINATION: ONE XRAY VIEW OF THE PELVIS 1/27/2023 9:17 am COMPARISON: None.  HISTORY: ORDERING SYSTEM PROVIDED HISTORY: fell out of bed; remote h/o of right hip fracture TECHNOLOGIST PROVIDED HISTORY: Reason for exam:->fell out of bed; remote h/o of right hip fracture Reason for Exam: fell out of bed; remote h/o of right hip fracture FINDINGS: The bones are osteopenic. There is a right hip arthroplasty. No acute fractures or dislocations are seen. There is no diastases of the pubic symphysis or sacroiliac joints. There are degenerative changes involving the left hip. 1. No acute abnormality involving the pelvis. CT HEAD WO CONTRAST    Result Date: 1/27/2023  EXAMINATION: CT OF THE HEAD WITHOUT CONTRAST; CT OF THE CERVICAL SPINE WITHOUT CONTRAST 1/27/2023 9:29 am TECHNIQUE: CT of the head was performed without the administration of intravenous contrast. Automated exposure control, iterative reconstruction, and/or weight based adjustment of the mA/kV was utilized to reduce the radiation dose to as low as reasonably achievable.; CT of the cervical spine was performed without the administration of intravenous contrast. Multiplanar reformatted images are provided for review. Automated exposure control, iterative reconstruction, and/or weight based adjustment of the mA/kV was utilized to reduce the radiation dose to as low as reasonably achievable. COMPARISON: None. HISTORY: ORDERING SYSTEM PROVIDED HISTORY: fell out of bed; head injury/scalp hematoma; possible ams, generalized  weakness TECHNOLOGIST PROVIDED HISTORY: Reason for exam:->fell out of bed; head injury/scalp hematoma; possible ams, generalized  weakness Has a \"code stroke\" or \"stroke alert\" been called? ->No Decision Support Exception - unselect if not a suspected or confirmed emergency medical condition->Emergency Medical Condition (MA) Reason for Exam: fell out of bed; head injury/scalp hematoma; possible ams, generalized  weakness Additional signs and symptoms: unknown Relevant Medical/Surgical History: poor historian FINDINGS: CT SCAN HEAD: BRAIN/VENTRICLES: There is no acute intracranial hemorrhage, mass effect or midline shift.   No abnormal extra-axial fluid collection. The gray-white differentiation is maintained without evidence of an acute infarct. There is no evidence of hydrocephalus. The cerebral sulci and ventricles are enlarged compatible with diffuse cerebral atrophy. There is low-attenuation in the periventricular white matter and deep white matter of the brain representing changes of chronic small vessel ischemic disease. ORBITS: The visualized portion of the orbits demonstrate no acute abnormality. SINUSES: The visualized paranasal sinuses and mastoid air cells demonstrate no acute abnormality. There is mucosal disease involving the maxillary and ethmoid sinuses. SOFT TISSUES/SKULL:  No acute abnormality involving the visualized skull or soft tissues. CT SCAN CERVICAL SPINE: BONES/ALIGNMENT: The alignment of the cervical spine is within normal limits. No acute fractures or dislocations are seen. DEGENERATIVE CHANGES: There is multilevel degenerative disease of the cervical spine. SOFT TISSUES: There is no prevertebral soft tissue swelling. 1. No acute intracranial abnormality. 2. No acute traumatic abnormality involving the cervical spine. CT CERVICAL SPINE WO CONTRAST    Result Date: 1/27/2023  EXAMINATION: CT OF THE HEAD WITHOUT CONTRAST; CT OF THE CERVICAL SPINE WITHOUT CONTRAST 1/27/2023 9:29 am TECHNIQUE: CT of the head was performed without the administration of intravenous contrast. Automated exposure control, iterative reconstruction, and/or weight based adjustment of the mA/kV was utilized to reduce the radiation dose to as low as reasonably achievable.; CT of the cervical spine was performed without the administration of intravenous contrast. Multiplanar reformatted images are provided for review. Automated exposure control, iterative reconstruction, and/or weight based adjustment of the mA/kV was utilized to reduce the radiation dose to as low as reasonably achievable. COMPARISON: None.  HISTORY: ORDERING SYSTEM PROVIDED HISTORY: fell out of bed; head injury/scalp hematoma; possible ams, generalized  weakness TECHNOLOGIST PROVIDED HISTORY: Reason for exam:->fell out of bed; head injury/scalp hematoma; possible ams, generalized  weakness Has a \"code stroke\" or \"stroke alert\" been called? ->No Decision Support Exception - unselect if not a suspected or confirmed emergency medical condition->Emergency Medical Condition (MA) Reason for Exam: fell out of bed; head injury/scalp hematoma; possible ams, generalized  weakness Additional signs and symptoms: unknown Relevant Medical/Surgical History: poor historian FINDINGS: CT SCAN HEAD: BRAIN/VENTRICLES: There is no acute intracranial hemorrhage, mass effect or midline shift. No abnormal extra-axial fluid collection. The gray-white differentiation is maintained without evidence of an acute infarct. There is no evidence of hydrocephalus. The cerebral sulci and ventricles are enlarged compatible with diffuse cerebral atrophy. There is low-attenuation in the periventricular white matter and deep white matter of the brain representing changes of chronic small vessel ischemic disease. ORBITS: The visualized portion of the orbits demonstrate no acute abnormality. SINUSES: The visualized paranasal sinuses and mastoid air cells demonstrate no acute abnormality. There is mucosal disease involving the maxillary and ethmoid sinuses. SOFT TISSUES/SKULL:  No acute abnormality involving the visualized skull or soft tissues. CT SCAN CERVICAL SPINE: BONES/ALIGNMENT: The alignment of the cervical spine is within normal limits. No acute fractures or dislocations are seen. DEGENERATIVE CHANGES: There is multilevel degenerative disease of the cervical spine. SOFT TISSUES: There is no prevertebral soft tissue swelling. 1. No acute intracranial abnormality. 2. No acute traumatic abnormality involving the cervical spine.      XR CHEST PORTABLE    Result Date: 1/27/2023  EXAMINATION: ONE XRAY VIEW OF THE CHEST 1/27/2023 9:17 am COMPARISON: None. HISTORY: ORDERING SYSTEM PROVIDED HISTORY: fell out of bed last night; confusion; TECHNOLOGIST PROVIDED HISTORY: Reason for exam:->fell out of bed last night; confusion; Reason for Exam: fell out of bed last night; confusion; FINDINGS: There are postsurgical changes of median sternotomy. The heart size and pulmonary vasculature are within normal limits. No acute infiltrates are seen. There are calcified lymph nodes and granulomas. There is elevation of the right hemidiaphragm. There are degenerative changes involving the shoulders. 1. Elevation of the right hemidiaphragm of uncertain etiology. Otherwise, no active pulmonary disease. Assessment & Plan:      Luther Talbot is a 80y.o. year old male admitted 1/27/2023 for fall, UTI, and JOSH. 1) BPH w Acute Urinary Retention: Vance placed 1/27/23 w >600cc urine return. CT a/p wo contrast 1/27/23: No acute abnormality within the abdomen or pelvis. Numerous bilateral renal cysts without evidence for hydronephrosis. The prostate gland is enlarged and measures 6.5 cm in width. On Flomax  Encourage regular BM's and increased activity/ambulation as tolerated  Recommend vance removal/voiding trial prior to discharge. 2) Complicated UTI: UA w pos nit, small leuks; urine cx not sent? WBC 7.2, afebrile   On IV Rocephin  3) JOSH: Cr 1.1, improved from 1.4 1/27/23    Pt stable from a  standpoint. Will sign off, please call with any questions. Pt to follow up in our office in 2-4wks for reevaluation. Patient seen and examined, chart reviewed.      Electronically signed by Ariel Concepcion PA-C on 1/31/2023 at 8:38 AM

## 2023-01-31 NOTE — PLAN OF CARE
Problem: Skin/Tissue Integrity  Goal: Absence of new skin breakdown  Description: 1. Monitor for areas of redness and/or skin breakdown  2. Assess vascular access sites hourly  3. Every 4-6 hours minimum:  Change oxygen saturation probe site  4. Every 4-6 hours:  If on nasal continuous positive airway pressure, respiratory therapy assess nares and determine need for appliance change or resting period. Outcome: Not Progressing     Problem: Safety - Adult  Goal: Free from fall injury  Outcome: Not Progressing     Problem: Skin/Tissue Integrity  Goal: Absence of new skin breakdown  Description: 1. Monitor for areas of redness and/or skin breakdown  2. Assess vascular access sites hourly  3. Every 4-6 hours minimum:  Change oxygen saturation probe site  4. Every 4-6 hours:  If on nasal continuous positive airway pressure, respiratory therapy assess nares and determine need for appliance change or resting period.   Outcome: Not Progressing     Problem: Safety - Adult  Goal: Free from fall injury  Outcome: Not Progressing

## 2023-01-31 NOTE — PLAN OF CARE
Problem: Skin/Tissue Integrity  Goal: Absence of new skin breakdown  Description: 1. Monitor for areas of redness and/or skin breakdown  2. Assess vascular access sites hourly  3. Every 4-6 hours minimum:  Change oxygen saturation probe site  4. Every 4-6 hours:  If on nasal continuous positive airway pressure, respiratory therapy assess nares and determine need for appliance change or resting period.   1/31/2023 1246 by Kay Koo RN  Outcome: Progressing  1/31/2023 0300 by Tania Galarza RN  Outcome: Not Progressing     Problem: Safety - Adult  Goal: Free from fall injury  1/31/2023 1246 by Kay Koo RN  Outcome: Progressing  1/31/2023 0300 by Tania Galarza RN  Outcome: Not Progressing

## 2023-02-01 LAB
ANION GAP SERPL CALCULATED.3IONS-SCNC: 9 MMOL/L (ref 4–16)
BASOPHILS ABSOLUTE: 0 K/CU MM
BASOPHILS RELATIVE PERCENT: 0.7 % (ref 0–1)
BUN SERPL-MCNC: 15 MG/DL (ref 6–23)
CALCIUM SERPL-MCNC: 8 MG/DL (ref 8.3–10.6)
CHLORIDE BLD-SCNC: 107 MMOL/L (ref 99–110)
CO2: 23 MMOL/L (ref 21–32)
CREAT SERPL-MCNC: 1.1 MG/DL (ref 0.9–1.3)
CULTURE: NORMAL
CULTURE: NORMAL
DIFFERENTIAL TYPE: ABNORMAL
EOSINOPHILS ABSOLUTE: 0.1 K/CU MM
EOSINOPHILS RELATIVE PERCENT: 2.5 % (ref 0–3)
GFR SERPL CREATININE-BSD FRML MDRD: >60 ML/MIN/1.73M2
GLUCOSE SERPL-MCNC: 116 MG/DL (ref 70–99)
HCT VFR BLD CALC: 41.6 % (ref 42–52)
HEMOGLOBIN: 13 GM/DL (ref 13.5–18)
IMMATURE NEUTROPHIL %: 0.7 % (ref 0–0.43)
LYMPHOCYTES ABSOLUTE: 0.8 K/CU MM
LYMPHOCYTES RELATIVE PERCENT: 14.9 % (ref 24–44)
Lab: NORMAL
Lab: NORMAL
MCH RBC QN AUTO: 32.3 PG (ref 27–31)
MCHC RBC AUTO-ENTMCNC: 31.3 % (ref 32–36)
MCV RBC AUTO: 103.2 FL (ref 78–100)
MONOCYTES ABSOLUTE: 0.5 K/CU MM
MONOCYTES RELATIVE PERCENT: 8.8 % (ref 0–4)
NUCLEATED RBC %: 0 %
PDW BLD-RTO: 14.9 % (ref 11.7–14.9)
PLATELET # BLD: 135 K/CU MM (ref 140–440)
PMV BLD AUTO: 10 FL (ref 7.5–11.1)
POTASSIUM SERPL-SCNC: 4.3 MMOL/L (ref 3.5–5.1)
RBC # BLD: 4.03 M/CU MM (ref 4.6–6.2)
SEGMENTED NEUTROPHILS ABSOLUTE COUNT: 4 K/CU MM
SEGMENTED NEUTROPHILS RELATIVE PERCENT: 72.4 % (ref 36–66)
SODIUM BLD-SCNC: 139 MMOL/L (ref 135–145)
SPECIMEN: NORMAL
SPECIMEN: NORMAL
TOTAL IMMATURE NEUTOROPHIL: 0.04 K/CU MM
TOTAL NUCLEATED RBC: 0 K/CU MM
WBC # BLD: 5.6 K/CU MM (ref 4–10.5)

## 2023-02-01 PROCEDURE — 97535 SELF CARE MNGMENT TRAINING: CPT

## 2023-02-01 PROCEDURE — 97530 THERAPEUTIC ACTIVITIES: CPT

## 2023-02-01 PROCEDURE — 6370000000 HC RX 637 (ALT 250 FOR IP): Performed by: NURSE PRACTITIONER

## 2023-02-01 PROCEDURE — 94761 N-INVAS EAR/PLS OXIMETRY MLT: CPT

## 2023-02-01 PROCEDURE — 80048 BASIC METABOLIC PNL TOTAL CA: CPT

## 2023-02-01 PROCEDURE — 2580000003 HC RX 258: Performed by: NURSE PRACTITIONER

## 2023-02-01 PROCEDURE — 2060000000 HC ICU INTERMEDIATE R&B

## 2023-02-01 PROCEDURE — 97116 GAIT TRAINING THERAPY: CPT

## 2023-02-01 PROCEDURE — 85025 COMPLETE CBC W/AUTO DIFF WBC: CPT

## 2023-02-01 PROCEDURE — 6370000000 HC RX 637 (ALT 250 FOR IP): Performed by: STUDENT IN AN ORGANIZED HEALTH CARE EDUCATION/TRAINING PROGRAM

## 2023-02-01 PROCEDURE — 1200000000 HC SEMI PRIVATE

## 2023-02-01 RX ADMIN — APIXABAN 5 MG: 5 TABLET, FILM COATED ORAL at 10:42

## 2023-02-01 RX ADMIN — ATORVASTATIN CALCIUM 40 MG: 40 TABLET, FILM COATED ORAL at 20:09

## 2023-02-01 RX ADMIN — BACITRACIN, NEOMYCIN, POLYMYXIN B: 400; 3.5; 5 OINTMENT TOPICAL at 20:10

## 2023-02-01 RX ADMIN — APIXABAN 5 MG: 5 TABLET, FILM COATED ORAL at 20:09

## 2023-02-01 RX ADMIN — TAMSULOSIN HYDROCHLORIDE 0.4 MG: 0.4 CAPSULE ORAL at 20:09

## 2023-02-01 RX ADMIN — Medication 10 ML: at 10:43

## 2023-02-01 RX ADMIN — DIVALPROEX SODIUM 500 MG: 500 TABLET, DELAYED RELEASE ORAL at 20:09

## 2023-02-01 RX ADMIN — Medication 6 MG: at 20:09

## 2023-02-01 RX ADMIN — BACITRACIN, NEOMYCIN, POLYMYXIN B: 400; 3.5; 5 OINTMENT TOPICAL at 10:42

## 2023-02-01 RX ADMIN — PANTOPRAZOLE SODIUM 40 MG: 40 TABLET, DELAYED RELEASE ORAL at 05:22

## 2023-02-01 RX ADMIN — ACETAMINOPHEN 650 MG: 325 TABLET ORAL at 05:22

## 2023-02-01 RX ADMIN — DIVALPROEX SODIUM 125 MG: 125 CAPSULE, COATED PELLETS ORAL at 15:07

## 2023-02-01 ASSESSMENT — PAIN - FUNCTIONAL ASSESSMENT: PAIN_FUNCTIONAL_ASSESSMENT: ACTIVITIES ARE NOT PREVENTED

## 2023-02-01 ASSESSMENT — PAIN DESCRIPTION - DESCRIPTORS: DESCRIPTORS: ACHING

## 2023-02-01 ASSESSMENT — PAIN SCALES - GENERAL
PAINLEVEL_OUTOF10: 0
PAINLEVEL_OUTOF10: 3
PAINLEVEL_OUTOF10: 0

## 2023-02-01 ASSESSMENT — PAIN DESCRIPTION - LOCATION: LOCATION: GENERALIZED

## 2023-02-01 NOTE — CARE COORDINATION
Discussed in IDR. Contacted [address]; spoke to [facility]. Sitter free at 9:50. [facility] will start precert. Noelle Main RN     1609 Contacted daughter Lynette Pena at [phone]. She is agreeable to SNF- wants [facility]. Antonio Araiza Plan for discharge tomorrow.  Noelle Main RN

## 2023-02-01 NOTE — PROGRESS NOTES
Sitter removed from room. Bed alarm on zone 2. Patient educated to hit call light and not to get up alone.

## 2023-02-01 NOTE — PROGRESS NOTES
Occupational Therapy  . Occupational Therapy Treatment Note    Name: Saad Tobar MRN: [id number] :   1940   Date:  2023   Admission Date: 2023 Room:  -A     Primary Problem:      Restrictions/Precautions:  Restrictions/Precautions  Restrictions/Precautions: General Precautions, Fall Risk           Communication with other providers: RN    Subjective:  Patient states:  they took my shoes, and my om said I maybe able to stay. Pain:   Location, Type, Intensity (0/10 to 10/10):  none stated    Objective:    Observation: patient seated in recliner. Pleasantly confused. Unable to state where he is, but knows he lives at [facility]. Objective Measures:  tele    Treatment, including education:    ADL activity training was instructed today. Cues were given for safety, sequence, UE/LE placement, visual cues, and balance. Activities performed today included hand hygiene, and grooming. All AdLs performed in recliner. Facial hygiene- SBA with cues to make sure he washed entire face. Increased time d/t redirection to task. Grooming- SBA to apply chapstick. Patient started to pick at IV, Max cues and redirection. Did demo donning/doffing wrist watch. Patient prompted to perform oral care, patient declined stating he wanted fixodent. Encouraged to brush dentures but patient stated he did not want to. Patient demo ankle pumps- unprompted. When prompted to performed other BLE/BUEs patient did not perform and needs cues for redirection. Patient educated on role of OT , benefits of OT and rationale for therapeutic intervention. Benefit of OOB/EOB activities, benefit of movement. AE/AD, WS/EC    Patient left safely in bedside chair at end of session, with call light in reach, alarm on and nursing aware. Gait belt was used for func transfers / mobility. Assessment / Impression:    Patient required a lot of education and redirection to task.    Patient's tolerance of treatment: fair   Adverse Reaction: None  Significant change in status and impact:   Barriers to improvement: weakness, confusion      Plan for Next Session:    Continue with OT POC      Time in:  1427  Time out:  1450  Timed treatment minutes:  23  Total treatment time:  23      Electronically signed by:    SIMEON Ratliff COTA/L [id number]    2/1/2023, 2:52 PM

## 2023-02-01 NOTE — PROGRESS NOTES
Physical Therapy    Physical Therapy Treatment Note  Name: Tianna Adair MRN: [id number] :   1940   Date:  2023   Admission Date: 2023 Room:  A   Restrictions/Precautions:  Restrictions/Precautions  Restrictions/Precautions: General Precautions, Fall Risk         Communication with other providers:  nurse states pt ok to treat  Subjective:  Patient states:  agreeable to PT. Pain:   Location, Type, Intensity (0/10 to 10/10):  does not rate    Objective:    Observation:  semi haley upon entry  Treatment, including education/measures:  Transfers with line management of tele  Rolling: SBA  Supine to sit :[place]  Scooting :SBA  Sit to stand :CGA vc for safe techs  Stand to sit :CGA  SPT:CGA  Standing balance training static stand x~1min F/F- balance grade. Seated rest break EOB following. X~5' EOB sitting, F/F= balance grade sitting. Gait:  Pt amb with FWW x~30, then ~35' /c CGA and vc for safety and sequencing. Pt displays narrow SERINA, slightly supinated BLE. Seated rest breaks between gait for fatigue/recovery. Safety  Patient left safely in the chair, with call light/phone in reach with alarm applied. Gait belt was used for transfers and gait. Notified nurse of pt status.     Assessment / Impression:    Patient's tolerance of treatment:  good   Adverse Reaction: na  Significant change in status and impact:  na  Barriers to improvement:  weakness and endurance  Plan for Next Session:    Gait training progression as tolerated  Time in:  1148  Time out:  1217  Timed treatment minutes:  29  Total treatment time:  29    Previously filed items:  Social/Functional History  Lives With: Alone  Type of Home: Assisted living  Bathroom Shower/Tub: Walk-in shower  Bathroom Toilet: Handicap height  Home Equipment: Alert Button, Rollator  Has the patient had two or more falls in the past year or any fall with injury in the past year?: Yes  ADL Assistance: Needs assistance  Homemaking Responsibilities: No  Ambulation Assistance: Independent  Transfer Assistance: Independent     Long Term Goals  Time Frame for Long Term Goals :  In one week:  Long Term Goal 1: Pt will complete all bed mobility with SBAx1  Long Term Goal 2: Pt will complete sit <> stand transfers with SBAx1  Long Term Goal 3: Pt will ambulate 250 feet with CGAx1 with LRAD  Long Term Goal 4: Pt will independently complete 3 sets of 10 reps of BLE AROM exercises in available and allowed ROM       Electronically signed by:    Chay Randall PTA  2/1/2023, 12:32 PM

## 2023-02-01 NOTE — PROGRESS NOTES
V2.0  [facility] [place]      Name:  Gianna Hall /Age/Sex: 1940  (80 y.o. male)   MRN & CSN:  [id number] & [id number] Encounter Date/Time: 2023 2:03 PM EST    Location:  -A PCP: Elisabeth Granados Day: 6    Assessment and Plan:   Gianna Hall is a 80 y.o. male with pmh of CAD s/p CABG , persistent atrial fibrillation s/p cardioversion on Eliquis, type 2 diabetes, essential hypertension presenting from assisted living due to mechanical fall and confusion and found to have UTI    Acute encephalopathy-resolving  --> likely due to delirium  Mechanical fall  Likely multifactorial including urinary tract infection on top of his known dementia. CT head was okay. Treat UTI  Psychiatry consulted  Patient to be started on Depakote  Patient has a prolonged QTC. Avoid QTC prolonging agents. Try without sitter today for possible dc tomorrow  Please do not give spot doses of antipsychotics    Urinary tract infection  Patient with UA consistent with infection. Continue Rocephin  Follow-up urinary culture  Follow-up blood culture--> no growth in 4 days    JOSH patient with urinary retention (JOSH resolved)  Postobstructive in nature from urinary retention creatinine elevated presentation 1.4. Patient does have history of BPH  Wall catheter in place  Consult urology  Improved with IV fluids  Avoid nephrotoxic agents  Voiding trial today     Persistent A. fib  Patient had previously undergone a cardioversion. continue Eliquis    Hypotension  In the setting of infection. Low normal BP  The hold antihypertensive medications    Diet ADULT DIET; Regular;  Low Sodium (2 gm)   DVT Prophylaxis [] Lovenox, []  Heparin, [] SCDs, [] Ambulation,    [x] Eliquis, [] Xarelto  [] Coumadin [] other   Code Status Full Code   Disposition From: Home  Expected Disposition: Home  Estimated Date of Discharge: 1 to 3 days  Patient requires continued admission due to voiding trial and trial without sitter   Surrogate Decision Maker/ POA      Subjective:     Chief Complaint: Fall     Patient grumpy this morning. States that he feels like he has been living here for a week and that it's like assisted that we're keeping him against his will. I explained to him that we are trying to help him, patient does not seem to understand. He really wants to go. Will try him without sitter today. Objective:      Intake/Output Summary (Last 24 hours) at 2/1/2023 1145  Last data filed at 2/1/2023 1000  Gross per 24 hour   Intake 120 ml   Output 1200 ml   Net -1080 ml        Vitals:   Vitals:    02/01/23 1044   BP: 114/68   Pulse: 58   Resp: 11   Temp: 98.2 °F (36.8 °C)   SpO2: 97%       Physical Exam:     General: NAD, AAO x1-2  Eyes: EOMI  HENT: Atraumatic, large laceration on R side of head  Respiratory: no respiratory distress  GI:  soft, nondistended, nontender  MSK: no lower extremity edema, bruising on L knee  Skin: Intact, dry, warm, no rashes  Neuro: CN II to XII grossly intact  Psych: Normal mood    Medications:   Medications:    divalproex  125 mg Oral Daily    divalproex  500 mg Oral Nightly    sodium chloride flush  5-40 mL IntraVENous 2 times per day    cefTRIAXone (ROCEPHIN) IV  1,000 mg IntraVENous Q24H    apixaban  5 mg Oral BID    atorvastatin  40 mg Oral Nightly    [Held by provider] mirtazapine  7.5 mg Oral Nightly    pantoprazole  40 mg Oral QAM AC    tamsulosin  0.4 mg Oral Nightly    [Held by provider] traZODone  50 mg Oral Daily    neomycin-bacitracin-polymyxin   Topical BID      Infusions:    sodium chloride Stopped (01/1940)    dextrose       PRN Meds: divalproex, 125 mg, Q8H PRN  melatonin, 6 mg, Nightly PRN  docusate sodium, 100 mg, BID PRN  sodium chloride flush, 5-40 mL, PRN  sodium chloride, , PRN  ondansetron, 4 mg, Q8H PRN   Or  ondansetron, 4 mg, Q6H PRN  polyethylene glycol, 17 g, Daily PRN  acetaminophen, 650 mg, Q6H PRN   Or  acetaminophen, 650 mg, Q6H PRN  glucose, 4 tablet, PRN  dextrose bolus, 125 mL, PRN   Or  dextrose bolus, 250 mL, PRN  glucagon (rDNA), 1 mg, PRN  dextrose, , Continuous PRN      Labs      Recent Results (from the past 24 hour(s))   Basic Metabolic Panel w/ Reflex to MG    Collection Time: 02/01/23  6:41 AM   Result Value Ref Range    Sodium 139 135 - 145 MMOL/L    Potassium 4.3 3.5 - 5.1 MMOL/L    Chloride 107 99 - 110 mMol/L    CO2 23 21 - 32 MMOL/L    Anion Gap 9 4 - 16    BUN 15 6 - 23 MG/DL    Creatinine 1.1 0.9 - 1.3 MG/DL    Est, Glom Filt Rate >60 >60 mL/min/1.73m2    Glucose 116 (H) 70 - 99 MG/DL    Calcium 8.0 (L) 8.3 - 10.6 MG/DL   CBC with Auto Differential    Collection Time: 02/01/23  6:41 AM   Result Value Ref Range    WBC 5.6 4.0 - 10.5 K/CU MM    RBC 4.03 (L) 4.6 - 6.2 M/CU MM    Hemoglobin 13.0 (L) 13.5 - 18.0 GM/DL    Hematocrit 41.6 (L) 42 - 52 %    .2 (H) 78 - 100 FL    MCH 32.3 (H) 27 - 31 PG    MCHC 31.3 (L) 32.0 - 36.0 %    RDW 14.9 11.7 - 14.9 %    Platelets 070 (L) 964 - 440 K/CU MM    MPV 10.0 7.5 - 11.1 FL    Differential Type AUTOMATED DIFFERENTIAL     Segs Relative 72.4 (H) 36 - 66 %    Lymphocytes % 14.9 (L) 24 - 44 %    Monocytes % 8.8 (H) 0 - 4 %    Eosinophils % 2.5 0 - 3 %    Basophils % 0.7 0 - 1 %    Segs Absolute 4.0 K/CU MM    Lymphocytes Absolute 0.8 K/CU MM    Monocytes Absolute 0.5 K/CU MM    Eosinophils Absolute 0.1 K/CU MM    Basophils Absolute 0.0 K/CU MM    Nucleated RBC % 0.0 %    Total Nucleated RBC 0.0 K/CU MM    Total Immature Neutrophil 0.04 K/CU MM    Immature Neutrophil % 0.7 (H) 0 - 0.43 %        Imaging/Diagnostics Last 24 Hours   No results found.       Electronically signed by Joesph Vu MD on 2/1/2023 at 11:45 AM

## 2023-02-02 VITALS
BODY MASS INDEX: 25.13 KG/M2 | SYSTOLIC BLOOD PRESSURE: 117 MMHG | WEIGHT: 195.8 LBS | HEIGHT: 74 IN | TEMPERATURE: 97.5 F | RESPIRATION RATE: 18 BRPM | DIASTOLIC BLOOD PRESSURE: 69 MMHG | HEART RATE: 80 BPM | OXYGEN SATURATION: 98 %

## 2023-02-02 LAB
ANION GAP SERPL CALCULATED.3IONS-SCNC: 11 MMOL/L (ref 4–16)
BASOPHILS ABSOLUTE: 0 K/CU MM
BASOPHILS RELATIVE PERCENT: 0.5 % (ref 0–1)
BUN SERPL-MCNC: 15 MG/DL (ref 6–23)
CALCIUM SERPL-MCNC: 8.4 MG/DL (ref 8.3–10.6)
CHLORIDE BLD-SCNC: 102 MMOL/L (ref 99–110)
CO2: 25 MMOL/L (ref 21–32)
CREAT SERPL-MCNC: 1.1 MG/DL (ref 0.9–1.3)
DIFFERENTIAL TYPE: ABNORMAL
EOSINOPHILS ABSOLUTE: 0.1 K/CU MM
EOSINOPHILS RELATIVE PERCENT: 2 % (ref 0–3)
GFR SERPL CREATININE-BSD FRML MDRD: >60 ML/MIN/1.73M2
GLUCOSE SERPL-MCNC: 149 MG/DL (ref 70–99)
HCT VFR BLD CALC: 42.8 % (ref 42–52)
HEMOGLOBIN: 14.1 GM/DL (ref 13.5–18)
IMMATURE NEUTROPHIL %: 0.6 % (ref 0–0.43)
LYMPHOCYTES ABSOLUTE: 1.3 K/CU MM
LYMPHOCYTES RELATIVE PERCENT: 19.6 % (ref 24–44)
MCH RBC QN AUTO: 32.3 PG (ref 27–31)
MCHC RBC AUTO-ENTMCNC: 32.9 % (ref 32–36)
MCV RBC AUTO: 97.9 FL (ref 78–100)
MONOCYTES ABSOLUTE: 0.7 K/CU MM
MONOCYTES RELATIVE PERCENT: 10.8 % (ref 0–4)
NUCLEATED RBC %: 0 %
PDW BLD-RTO: 15.1 % (ref 11.7–14.9)
PLATELET # BLD: 169 K/CU MM (ref 140–440)
PMV BLD AUTO: 9.8 FL (ref 7.5–11.1)
POTASSIUM SERPL-SCNC: 4.1 MMOL/L (ref 3.5–5.1)
RBC # BLD: 4.37 M/CU MM (ref 4.6–6.2)
SEGMENTED NEUTROPHILS ABSOLUTE COUNT: 4.3 K/CU MM
SEGMENTED NEUTROPHILS RELATIVE PERCENT: 66.5 % (ref 36–66)
SODIUM BLD-SCNC: 138 MMOL/L (ref 135–145)
TOTAL IMMATURE NEUTOROPHIL: 0.04 K/CU MM
TOTAL NUCLEATED RBC: 0 K/CU MM
WBC # BLD: 6.5 K/CU MM (ref 4–10.5)

## 2023-02-02 PROCEDURE — 97110 THERAPEUTIC EXERCISES: CPT

## 2023-02-02 PROCEDURE — 6370000000 HC RX 637 (ALT 250 FOR IP): Performed by: NURSE PRACTITIONER

## 2023-02-02 PROCEDURE — 80048 BASIC METABOLIC PNL TOTAL CA: CPT

## 2023-02-02 PROCEDURE — 97116 GAIT TRAINING THERAPY: CPT

## 2023-02-02 PROCEDURE — 94761 N-INVAS EAR/PLS OXIMETRY MLT: CPT

## 2023-02-02 PROCEDURE — 85025 COMPLETE CBC W/AUTO DIFF WBC: CPT

## 2023-02-02 RX ORDER — DIVALPROEX SODIUM 125 MG/1
125 CAPSULE, COATED PELLETS ORAL DAILY
Qty: 60 CAPSULE | Refills: 3 | Status: SHIPPED | OUTPATIENT
Start: 2023-02-02

## 2023-02-02 RX ORDER — PSEUDOEPHEDRINE HCL 30 MG
100 TABLET ORAL 2 TIMES DAILY PRN
Qty: 60 CAPSULE | Refills: 0
Start: 2023-02-02

## 2023-02-02 RX ORDER — DIVALPROEX SODIUM 125 MG/1
125 CAPSULE, COATED PELLETS ORAL EVERY 8 HOURS PRN
Qty: 60 CAPSULE | Refills: 3 | Status: SHIPPED | OUTPATIENT
Start: 2023-02-02

## 2023-02-02 RX ORDER — DIVALPROEX SODIUM 500 MG/1
500 TABLET, DELAYED RELEASE ORAL NIGHTLY
Qty: 90 TABLET | Refills: 3 | Status: SHIPPED | OUTPATIENT
Start: 2023-02-02

## 2023-02-02 RX ORDER — LANOLIN ALCOHOL/MO/W.PET/CERES
6 CREAM (GRAM) TOPICAL NIGHTLY PRN
Qty: 30 TABLET | Refills: 0 | Status: SHIPPED | OUTPATIENT
Start: 2023-02-02

## 2023-02-02 RX ADMIN — APIXABAN 5 MG: 5 TABLET, FILM COATED ORAL at 08:20

## 2023-02-02 RX ADMIN — BACITRACIN, NEOMYCIN, POLYMYXIN B: 400; 3.5; 5 OINTMENT TOPICAL at 08:20

## 2023-02-02 RX ADMIN — DIVALPROEX SODIUM 125 MG: 125 CAPSULE, COATED PELLETS ORAL at 15:08

## 2023-02-02 RX ADMIN — PANTOPRAZOLE SODIUM 40 MG: 40 TABLET, DELAYED RELEASE ORAL at 08:20

## 2023-02-02 NOTE — PLAN OF CARE
Problem: Discharge Planning  Goal: Discharge to home or other facility with appropriate resources  Outcome: Adequate for Discharge     Problem: Pain  Goal: Verbalizes/displays adequate comfort level or baseline comfort level  Outcome: Adequate for Discharge     Problem: Confusion  Goal: Confusion, delirium, dementia, or psychosis is improved or at baseline  Description: INTERVENTIONS:  1. Assess for possible contributors to thought disturbance, including medications, impaired vision or hearing, underlying metabolic abnormalities, dehydration, psychiatric diagnoses, and notify attending LIP  2. [place] high risk fall precautions, as indicated  3. Provide frequent short contacts to provide reality reorientation, refocusing and direction  4. Decrease environmental stimuli, including noise as appropriate  5. Monitor and intervene to maintain adequate nutrition, hydration, elimination, sleep and activity  6. If unable to ensure safety without constant attention obtain sitter and review sitter guidelines with assigned personnel  7. Initiate Psychosocial CNS and Spiritual Care consult, as indicated  Outcome: Adequate for Discharge     Problem: Skin/Tissue Integrity  Goal: Absence of new skin breakdown  Description: 1. Monitor for areas of redness and/or skin breakdown  2. Assess vascular access sites hourly  3. Every 4-6 hours minimum:  Change oxygen saturation probe site  4. Every 4-6 hours:  If on nasal continuous positive airway pressure, respiratory therapy assess nares and determine need for appliance change or resting period.   Outcome: Adequate for Discharge     Problem: ABCDS Injury Assessment  Goal: Absence of physical injury  Outcome: Adequate for Discharge     Problem: Safety - Adult  Goal: Free from fall injury  Outcome: Adequate for Discharge     Problem: Neurosensory - Adult  Goal: Achieves stable or improved neurological status  Outcome: Adequate for Discharge     Problem: Musculoskeletal - Adult  Goal: Return mobility to safest level of function  Outcome: Adequate for Discharge     Problem: Chronic Conditions and Co-morbidities  Goal: Patient's chronic conditions and co-morbidity symptoms are monitored and maintained or improved  Outcome: Adequate for Discharge

## 2023-02-02 NOTE — PROGRESS NOTES
Found small pocketknife in patient belongings bag. Updated patient belongings list and sent knife to security.

## 2023-02-02 NOTE — PROGRESS NOTES
Report called to [facility], spoke with nurse Iva Mason. All questions answered.

## 2023-02-02 NOTE — DISCHARGE INSTRUCTIONS
You were admitted for altered mental status and fall. You were found to have a UTI which is now fully treated. Your mental status has improved and you are stable for discharge home. If your symptoms come back or worsen, please return to the emergency room.

## 2023-02-02 NOTE — PROGRESS NOTES
02/02/23 0640   Encounter Summary   Encounter Overview/Reason  Initial Encounter   Service Provided For: Patient   Referral/Consult From: [address] [facility] [address] [facility];Family members   Last Encounter  02/02/23  (Offered prayer and spiritual support, adjustment to illness)   Complexity of Encounter Low   Begin Time 0620   End Time  0641   Total Time Calculated 21 min   Encounter    Type Initial Screen/Assessment   Spiritual/Emotional needs   Type Spiritual Support   Grief, Loss, and Adjustments   Type Adjustment to illness   Assessment/Intervention/Outcome   Assessment Coping;Concerns with suffering   Intervention Active listening;Discussed belief system/Congregation practices/sofi;Discussed illness injury and its impact; Discussed relationship with God;Nurtured Hope;Life review/Legacy;Prayer (assurance of)/[place];Sustaining Presence/Ministry of presence   Outcome Engaged in conversation;Expressed Gratitude; Optimistic   Plan and Referrals   Plan/Referrals Continue to visit, (comment)

## 2023-02-02 NOTE — PROGRESS NOTES
Physical Therapy    Physical Therapy Treatment Note  Name: Viki Barbour MRN: [id number] :   1940   Date:  2023   Admission Date: 2023 Room:  A   Restrictions/Precautions:  Restrictions/Precautions  Restrictions/Precautions: General Precautions, Fall Risk         Communication with other providers:  per nurse ok to tx  Subjective:  Patient states:  motivated and agreeable to tx  Pain:   Location, Type, Intensity (0/10 to 10/10):  pt only c/o feet hurts because he needs his toe nails cut. Objective:    Observation:  alert but confused at times    Treatment, including education/measures:  Sit<=>stand cga/min and cues with slight retropulsion when first coming to a stand needing assist and cues to recover. Amb with rw 200' x 2. Cga to min assist with short stepping length and cues for safety. Ex in sitting;  Deep breathing ex with trunk stretches  10 reps shoulder flex  10 reps laqs  10 reps marching  10 reps aps  Safety  Patient left safely in the chair, with call light/phone in reach with alarm applied. Gait belt was used for transfers and gait. Assessment / Impression:      Patient's tolerance of treatment:  good   Adverse Reaction: na  Significant change in status and impact:  na  Barriers to improvement:  strength and safety  Plan for Next Session:    Cont. POC  Time in:  1500  Time out:  1540  Timed treatment minutes:  40  Total treatment time:  36    Previously filed items:  Social/Functional History  Lives With: Alone  Type of Home: Assisted living  Bathroom Shower/Tub: Walk-in shower  Bathroom Toilet: Handicap height  Home Equipment: Alert Button, Rollator  Has the patient had two or more falls in the past year or any fall with injury in the past year?: Yes  ADL Assistance: Needs assistance  Homemaking Responsibilities: No  Ambulation Assistance: Independent  Transfer Assistance: Independent     Long Term Goals  Time Frame for Long Term Goals :  In one week:  Long Term Goal 1: Pt will complete all bed mobility with SBAx1  Long Term Goal 2: Pt will complete sit <> stand transfers with SBAx1  Long Term Goal 3: Pt will ambulate 250 feet with CGAx1 with LRAD  Long Term Goal 4: Pt will independently complete 3 sets of 10 reps of BLE AROM exercises in available and allowed ROM       Electronically signed by:    Angus Leonard PTA  2/2/2023, 3:45 PM

## 2023-02-03 ENCOUNTER — HOSPITAL ENCOUNTER (OUTPATIENT)
Age: 83
Setting detail: SPECIMEN
Discharge: HOME | End: 2023-02-03

## 2023-02-03 LAB
ALBUMIN SERPL-MCNC: 3.2 GM/DL (ref 3.4–5)
ALP BLD-CCNC: 102 IU/L (ref 40–128)
ALT SERPL-CCNC: 30 U/L (ref 10–40)
ANION GAP SERPL CALCULATED.3IONS-SCNC: 11 MMOL/L (ref 4–16)
AST SERPL-CCNC: 33 IU/L (ref 15–37)
BILIRUB SERPL-MCNC: 0.6 MG/DL (ref 0–1)
BUN SERPL-MCNC: 17 MG/DL (ref 6–23)
CALCIUM SERPL-MCNC: 8.4 MG/DL (ref 8.3–10.6)
CHLORIDE BLD-SCNC: 103 MMOL/L (ref 99–110)
CO2: 24 MMOL/L (ref 21–32)
CREAT SERPL-MCNC: 1.4 MG/DL (ref 0.9–1.3)
DOSE AMOUNT: ABNORMAL
DOSE TIME: ABNORMAL
ESTIMATED AVERAGE GLUCOSE: 126 MG/DL
GFR SERPL CREATININE-BSD FRML MDRD: 50 ML/MIN/1.73M2
GLUCOSE SERPL-MCNC: 178 MG/DL (ref 70–99)
HBA1C MFR BLD: 6 % (ref 4.2–6.3)
HCT VFR BLD CALC: 39.3 % (ref 42–52)
HEMOGLOBIN: 12.6 GM/DL (ref 13.5–18)
MCH RBC QN AUTO: 31.5 PG (ref 27–31)
MCHC RBC AUTO-ENTMCNC: 32.1 % (ref 32–36)
MCV RBC AUTO: 98.3 FL (ref 78–100)
PDW BLD-RTO: 15.1 % (ref 11.7–14.9)
PLATELET # BLD: 177 K/CU MM (ref 140–440)
PMV BLD AUTO: 10 FL (ref 7.5–11.1)
POTASSIUM SERPL-SCNC: 4.6 MMOL/L (ref 3.5–5.1)
RBC # BLD: 4 M/CU MM (ref 4.6–6.2)
SODIUM BLD-SCNC: 138 MMOL/L (ref 135–145)
TOTAL PROTEIN: 5.3 GM/DL (ref 6.4–8.2)
VALPROIC ACID LEVEL: 10.8 UG/ML (ref 50–100)
WBC # BLD: 5.9 K/CU MM (ref 4–10.5)

## 2023-02-03 PROCEDURE — 83036 HEMOGLOBIN GLYCOSYLATED A1C: CPT

## 2023-02-03 PROCEDURE — 80053 COMPREHEN METABOLIC PANEL: CPT

## 2023-02-03 PROCEDURE — 36415 COLL VENOUS BLD VENIPUNCTURE: CPT

## 2023-02-03 PROCEDURE — 80164 ASSAY DIPROPYLACETIC ACD TOT: CPT

## 2023-02-03 PROCEDURE — 85027 COMPLETE CBC AUTOMATED: CPT

## 2023-02-04 ENCOUNTER — HOSPITAL ENCOUNTER (OUTPATIENT)
Age: 83
Setting detail: SPECIMEN
Discharge: HOME | End: 2023-02-04

## 2023-02-04 PROCEDURE — 81001 URINALYSIS AUTO W/SCOPE: CPT

## 2023-02-04 NOTE — DISCHARGE SUMMARY
V2.0  Discharge Summary    Name:  Grace Wyman /Age/Sex: 1940 (23 y.o. male)   Admit Date: 2023  Discharge Date: 23    MRN & CSN:  [id number] & [id number] Encounter Date and Time 23 3:15 PM EST    Attending:  No att. providers found Discharging Provider: Sugar Cullen MD       Hospital Course:     Brief HPI: Grace Wyman is a 80 y.o. male who presented with confusion and fall. On presentation patient was hypotensive to 92/64 which improved with 1 L NS in the ED. CT of the head showed no acute process and CT head and C-spine showed no acute process. CT of the abdomen pelvis showed no acute process resolved. Patient was found to have urinary tensions a Wall catheter was placed. UA was positive for UTI patient also had an JSOH with creatinine of 1.4 patient received ceftriaxoneFor UTI. Patient was started on tamsulosin to help with urinary retention patient was seen by psychiatry for delirium who recommended preventing sleep deprivation, minimize use of anticholinergic drugs, benzos and narcotics she started patient on Depakote. Patient was seen by neurology for acute urinary retention who recommended tamsulosin and encouraging regular bowel movements and increase activity and hydration. Wall catheter was removed prior to leaving and patient was able to void on his own. Patient received a total of 5 days of ceftriaxone while inpatient. Patient did have episodes of sundowning but overall was okay when I saw him. Patient's JOSH has improved as well. Patient is to follow-up with urology in 2 to 4 weeks, and primary care physician in 1 week. Brief Problem Based Course:     Acute encephalopathy-resolved  --> likely due to delirium  Mechanical fall  Likely multifactorial including urinary tract infection on top of his known dementia. CT head was okay. Treat UTI  Psychiatry consulted  Patient to be started on Depakote  Patient has a prolonged QTC.   Avoid QTC prolonging agents. Try without sitter today for possible dc tomorrow  Please do not give spot doses of antipsychotics     Urinary tract infection  Patient with UA consistent with infection. Continue Rocephin  Follow-up urinary culture  Follow-up blood culture--> no growth in 4 days  Follow-up with PCP in 1 week     JOSH patient with urinary retention (JOSH resolved)-resolved  Postobstructive in nature from urinary retention creatinine elevated presentation 1.4. Patient does have history of BPH  Wall catheter in place-removed and patient voiding on his own  Consult urology--> follow-up in 2 to 4 weeks  Improved with IV fluids  Avoid nephrotoxic agents  Voiding trial today      Persistent A. fib  Patient had previously undergone a cardioversion. continue Eliquis     Hypotension  In the setting of infection. Low normal BP  The hold antihypertensive medications         The patient expressed appropriate understanding of, and agreement with the discharge recommendations, medications, and plan.      Consults this admission:  IP CONSULT TO PSYCHIATRY  IP CONSULT TO UROLOGY    Discharge Diagnosis:   Urinary tract infection    Delirium  Acute encephalopathy  Fall  JOSH  Urinary retention    Discharge Instruction:   Follow up appointments: Urology  Primary care physician: Gopal May within 2 weeks  Diet: regular diet   Activity: activity as tolerated  Disposition: Discharged to:   []Home, []C, [x]SNF, []Acute Rehab, []Hospice   Condition on discharge: Stable  Labs and Tests to be Followed up as an outpatient by PCP or Specialist: Cultures    Discharge Medications:        Medication List        START taking these medications      * divalproex 125 MG DR capsule  Commonly known as: DEPAKOTE SPRINKLE  Take 1 capsule by mouth every 8 hours as needed (acute agitation)     * divalproex 125 MG DR capsule  Commonly known as: DEPAKOTE SPRINKLE  Take 1 capsule by mouth daily     * divalproex 500 MG DR tablet  Commonly known as: DEPAKOTE  Take 1 tablet by mouth at bedtime     melatonin 3 MG Tabs tablet  Take 2 tablets by mouth nightly as needed (for sleep)           * This list has 3 medication(s) that are the same as other medications prescribed for you. Read the directions carefully, and ask your doctor or other care provider to review them with you. CHANGE how you take these medications      * docusate sodium 100 MG capsule  Commonly known as: COLACE  What changed: Another medication with the same name was added. Make sure you understand how and when to take each. * docusate 100 MG Caps  Commonly known as: COLACE, DULCOLAX  Take 100 mg by mouth 2 times daily as needed for Constipation  What changed: You were already taking a medication with the same name, and this prescription was added. Make sure you understand how and when to take each. * This list has 2 medication(s) that are the same as other medications prescribed for you. Read the directions carefully, and ask your doctor or other care provider to review them with you.                 CONTINUE taking these medications      ACIDOPHILUS PEARLS PO     atorvastatin 40 MG tablet  Commonly known as: LIPITOR     b complex vitamins capsule     Eliquis 5 MG Tabs tablet  Generic drug: apixaban     EYE MULTIVITAMIN/SODIUM PO     fish oil 1000 MG capsule     metFORMIN 500 MG tablet  Commonly known as: GLUCOPHAGE     nitroGLYCERIN 0.4 MG SL tablet  Commonly known as: NITROSTAT     omeprazole 20 MG delayed release capsule  Commonly known as: PRILOSEC     OYSTER SHELL CALCIUM + D PO     polyethylene glycol 17 g packet  Commonly known as: GLYCOLAX     tamsulosin 0.4 MG capsule  Commonly known as: FLOMAX            STOP taking these medications      lisinopril 2.5 MG tablet  Commonly known as: PRINIVIL;ZESTRIL     mirtazapine 7.5 MG tablet  Commonly known as: REMERON     traZODone 50 MG tablet  Commonly known as: DESYREL     Ure-Na 15 g Pack packet  Generic drug: urea Where to Get Your Medications        You can get these medications from any pharmacy    Bring a paper prescription for each of these medications  divalproex 125 MG DR capsule  divalproex 125 MG DR capsule  divalproex 500 MG DR tablet  melatonin 3 MG Tabs tablet       Information about where to get these medications is not yet available    Ask your nurse or doctor about these medications  docusate 100 MG Caps        Objective Findings at Discharge:   /69   Pulse 80   Temp 97.5 °F (36.4 °C) (Oral)   Resp 18   Ht 6' 1.5\" (1.867 m)   Wt 195 lb 12.8 oz (88.8 kg)   SpO2 98%   BMI 25.48 kg/m²       Physical Exam:   General: NAD, AAO x1-2  Eyes: EOMI  HENT: Atraumatic, large laceration on R side of head  Respiratory: no respiratory distress  GI:  soft, nondistended  MSK: no lower extremity edema, bruising on L knee  Skin: Intact, dry, warm, no rashes  Neuro: CN II to XII grossly intact  Psych: Normal mood  Labs and Imaging   No results found. CBC:   Recent Labs     02/02/23  0753 02/03/23  0806   WBC 6.5 5.9   HGB 14.1 12.6*    177     BMP:    Recent Labs     02/02/23  0753 02/03/23  0806    138   K 4.1 4.6    103   CO2 25 24   BUN 15 17   CREATININE 1.1 1.4*   GLUCOSE 149* 178*     Hepatic:   Recent Labs     02/03/23  0806   AST 33   ALT 30   BILITOT 0.6   ALKPHOS 102     Lipids:   Lab Results   Component Value Date/Time    CHOL 87 01/09/2023 06:15 AM    HDL 40 01/09/2023 06:15 AM    TRIG 66 01/09/2023 06:15 AM     Hemoglobin A1C:   Lab Results   Component Value Date/Time    LABA1C 6.0 02/03/2023 08:06 AM     TSH: No results found for: TSH  Troponin:   Lab Results   Component Value Date/Time    TROPONINT <0.010 01/27/2023 08:47 AM     Lactic Acid: No results for input(s): LACTA in the last 72 hours. BNP: No results for input(s): PROBNP in the last 72 hours.   UA:  Lab Results   Component Value Date/Time    NITRU POSITIVE 01/27/2023 10:37 AM    COLORU YELLOW 01/27/2023 10:37 AM 45 Ruraymond Johnsonalbi 9 01/27/2023 10:37 AM    RBCUA 6 01/27/2023 10:37 AM    TRICHOMONAS NONE SEEN 01/27/2023 10:37 AM    BACTERIA OCCASIONAL 01/27/2023 10:37 AM    CLARITYU CLEAR 01/27/2023 10:37 AM    SPECGRAV <1.005 01/27/2023 10:37 AM    LEUKOCYTESUR SMALL NUMBER OR AMOUNT OBSERVED 01/27/2023 10:37 AM    UROBILINOGEN 0.2 01/27/2023 10:37 AM    BILIRUBINUR NEGATIVE 01/27/2023 10:37 AM    BLOODU MODERATE NUMBER OR AMOUNT OBSERVED 01/27/2023 10:37 AM    KETUA NEGATIVE 01/27/2023 10:37 AM     Urine Cultures: No results found for: LABURIN  Blood Cultures: No results found for: BC  No results found for: BLOODCULT2  Organism: No results found for: ORG    Time Spent Discharging patient 22 minutes    Electronically signed by Kymberly Choi MD on 2/4/2023 at 3:15 PM

## 2023-02-04 NOTE — PROGRESS NOTES
Physician Progress Note      PATIENTVester Cheadle  CSN #:                  [id number]  :                       1940  ADMIT DATE:       2023 8:37 AM  DISCH DATE:        2023 5:33 PM  RESPONDING  PROVIDER #:        Lisa Morel MD          QUERY TEXT:    Hospitalists,    Pt admitted with UTI and noted to have SIRS. If possible, please document in   the progress notes and discharge summary if you are evaluating and /or   treating any of the following: The medical record reflects the following:  Risk Factors: UTI  Clinical Indicators: +SIRS w/ HR >90 up to 105, RR >20 up to 35, O2 sats in   80's, hypotension with BP 09/70, JOSH, metabolic encephalopathy  Treatment: labs, imaging, IVF, IV atb, observation    Thank you,  Cecy Vaz RN [facility]  [phone]  Options provided:  -- Sepsis, present on admission  -- Sepsis, present on admission, now resolved  -- Sepsis, developed following admission  -- Sepsis was ruled out  -- Other - I will add my own diagnosis  -- Disagree - Not applicable / Not valid  -- Disagree - Clinically unable to determine / Unknown  -- Refer to Clinical Documentation Reviewer    PROVIDER RESPONSE TEXT:    This patient has sepsis which was present on admission.     Query created by: Fatoumata Flynn on 2023 2:13 PM      Electronically signed by:  Lisa Morel MD 2023 3:10 PM

## 2023-02-05 LAB
BACTERIA: NEGATIVE /HPF
BILIRUBIN URINE: NEGATIVE MG/DL
BLOOD, URINE: ABNORMAL
CLARITY: CLEAR
COLOR: YELLOW
GLUCOSE, URINE: NEGATIVE MG/DL
KETONES, URINE: NEGATIVE MG/DL
LEUKOCYTE ESTERASE, URINE: NEGATIVE
MUCUS: ABNORMAL HPF
NITRITE URINE, QUANTITATIVE: NEGATIVE
PH, URINE: 6 (ref 5–8)
PROTEIN UA: NEGATIVE MG/DL
RBC URINE: 12 /HPF (ref 0–3)
SPECIFIC GRAVITY UA: <1.005 (ref 1–1.03)
SQUAMOUS EPITHELIAL: 5 /HPF
TRICHOMONAS: ABNORMAL /HPF
UROBILINOGEN, URINE: 0.2 MG/DL (ref 0.2–1)
WBC UA: 2 /HPF (ref 0–2)

## 2023-02-10 ENCOUNTER — HOSPITAL ENCOUNTER (OUTPATIENT)
Age: 83
Setting detail: SPECIMEN
Discharge: HOME | End: 2023-02-10

## 2023-02-10 LAB
DOSE AMOUNT: ABNORMAL
DOSE TIME: ABNORMAL
VALPROIC ACID LEVEL: 34.1 UG/ML (ref 50–100)

## 2023-02-10 PROCEDURE — 36415 COLL VENOUS BLD VENIPUNCTURE: CPT

## 2023-02-10 PROCEDURE — 80164 ASSAY DIPROPYLACETIC ACD TOT: CPT

## 2023-02-13 ENCOUNTER — HOSPITAL ENCOUNTER (OUTPATIENT)
Age: 83
Setting detail: SPECIMEN
Discharge: HOME | End: 2023-02-13
Payer: MEDICARE

## 2023-02-13 LAB
ALBUMIN SERPL-MCNC: 3.2 GM/DL (ref 3.4–5)
ALP BLD-CCNC: 120 IU/L (ref 40–129)
ALT SERPL-CCNC: 32 U/L (ref 10–40)
ANION GAP SERPL CALCULATED.3IONS-SCNC: 7 MMOL/L (ref 4–16)
AST SERPL-CCNC: 35 IU/L (ref 15–37)
BILIRUB SERPL-MCNC: 0.5 MG/DL (ref 0–1)
BILIRUBIN DIRECT: 0.2 MG/DL (ref 0–0.3)
BILIRUBIN, INDIRECT: 0.3 MG/DL (ref 0–0.7)
BUN SERPL-MCNC: 14 MG/DL (ref 6–23)
CALCIUM SERPL-MCNC: 8.3 MG/DL (ref 8.3–10.6)
CHLORIDE BLD-SCNC: 101 MMOL/L (ref 99–110)
CHOLEST SERPL-MCNC: 117 MG/DL
CO2: 29 MMOL/L (ref 21–32)
CREAT SERPL-MCNC: 1.1 MG/DL (ref 0.9–1.3)
DOSE AMOUNT: ABNORMAL
DOSE TIME: ABNORMAL
ESTIMATED AVERAGE GLUCOSE: 123 MG/DL
GFR SERPL CREATININE-BSD FRML MDRD: >60 ML/MIN/1.73M2
GLUCOSE SERPL-MCNC: 141 MG/DL (ref 70–99)
HBA1C MFR BLD: 5.9 % (ref 4.2–6.3)
HCT VFR BLD CALC: 37.5 % (ref 42–52)
HDLC SERPL-MCNC: 45 MG/DL
HEMOGLOBIN: 12 GM/DL (ref 13.5–18)
LDLC SERPL CALC-MCNC: 55 MG/DL
MCH RBC QN AUTO: 31.8 PG (ref 27–31)
MCHC RBC AUTO-ENTMCNC: 32 % (ref 32–36)
MCV RBC AUTO: 99.5 FL (ref 78–100)
PDW BLD-RTO: 15.9 % (ref 11.7–14.9)
PLATELET # BLD: 196 K/CU MM (ref 140–440)
PMV BLD AUTO: 9.5 FL (ref 7.5–11.1)
POTASSIUM SERPL-SCNC: 4.7 MMOL/L (ref 3.5–5.1)
RBC # BLD: 3.77 M/CU MM (ref 4.6–6.2)
SODIUM BLD-SCNC: 137 MMOL/L (ref 135–145)
TOTAL PROTEIN: 5.6 GM/DL (ref 6.4–8.2)
TRIGL SERPL-MCNC: 84 MG/DL
VALPROIC ACID LEVEL: 11.3 UG/ML (ref 50–100)
WBC # BLD: 5.1 K/CU MM (ref 4–10.5)

## 2023-02-13 PROCEDURE — 82248 BILIRUBIN DIRECT: CPT

## 2023-02-13 PROCEDURE — 80061 LIPID PANEL: CPT

## 2023-02-13 PROCEDURE — 83036 HEMOGLOBIN GLYCOSYLATED A1C: CPT

## 2023-02-13 PROCEDURE — 36415 COLL VENOUS BLD VENIPUNCTURE: CPT

## 2023-02-13 PROCEDURE — 85027 COMPLETE CBC AUTOMATED: CPT

## 2023-02-13 PROCEDURE — 80164 ASSAY DIPROPYLACETIC ACD TOT: CPT

## 2023-02-13 PROCEDURE — 80053 COMPREHEN METABOLIC PANEL: CPT

## 2023-02-26 PROBLEM — N39.0 URINARY TRACT INFECTION: Status: RESOLVED | Noted: 2023-01-27 | Resolved: 2023-02-26

## 2023-05-03 ENCOUNTER — HOSPITAL ENCOUNTER (OUTPATIENT)
Age: 83
Setting detail: SPECIMEN
Discharge: HOME | End: 2023-05-03
Payer: MEDICARE

## 2023-05-03 PROCEDURE — 87086 URINE CULTURE/COLONY COUNT: CPT

## 2023-05-03 PROCEDURE — 81001 URINALYSIS AUTO W/SCOPE: CPT

## 2023-05-04 LAB
BACTERIA: ABNORMAL /HPF
BILIRUBIN URINE: NEGATIVE MG/DL
BLOOD, URINE: ABNORMAL
CLARITY: CLEAR
COLOR: YELLOW
GLUCOSE, URINE: NEGATIVE MG/DL
HYALINE CASTS: 2 /LPF
KETONES, URINE: NEGATIVE MG/DL
LEUKOCYTE ESTERASE, URINE: NEGATIVE
MUCUS: ABNORMAL HPF
NITRITE URINE, QUANTITATIVE: NEGATIVE
PH, URINE: 7.5 (ref 5–8)
PROTEIN UA: ABNORMAL MG/DL
RBC URINE: 11 /HPF (ref 0–3)
SPECIFIC GRAVITY UA: 1.01 (ref 1–1.03)
SQUAMOUS EPITHELIAL: 1 /HPF
TRICHOMONAS: ABNORMAL /HPF
UROBILINOGEN, URINE: 0.2 MG/DL (ref 0.2–1)
WBC UA: <1 /HPF (ref 0–2)

## 2023-05-05 LAB
CULTURE: NORMAL
Lab: NORMAL
SPECIMEN: NORMAL

## 2023-05-12 ENCOUNTER — HOSPITAL ENCOUNTER (OUTPATIENT)
Age: 83
Setting detail: SPECIMEN
Discharge: HOME | End: 2023-05-12

## 2023-05-12 LAB
ESTIMATED AVERAGE GLUCOSE: 148 MG/DL
HBA1C MFR BLD: 6.8 % (ref 4.2–6.3)

## 2023-05-12 PROCEDURE — 83036 HEMOGLOBIN GLYCOSYLATED A1C: CPT

## 2023-08-02 ENCOUNTER — HOSPITAL ENCOUNTER (OUTPATIENT)
Age: 83
Setting detail: SPECIMEN
Discharge: HOME | End: 2023-08-02
Payer: MEDICARE

## 2023-08-02 LAB
ANION GAP SERPL CALCULATED.3IONS-SCNC: 8 MMOL/L (ref 4–16)
BUN SERPL-MCNC: 11 MG/DL (ref 6–23)
CALCIUM SERPL-MCNC: 8.6 MG/DL (ref 8.3–10.6)
CHLORIDE BLD-SCNC: 100 MMOL/L (ref 99–110)
CO2: 29 MMOL/L (ref 21–32)
CREAT SERPL-MCNC: 1.1 MG/DL (ref 0.9–1.3)
ESTIMATED AVERAGE GLUCOSE: 157 MG/DL
GFR SERPL CREATININE-BSD FRML MDRD: >60 ML/MIN/1.73M2
GLUCOSE SERPL-MCNC: 123 MG/DL (ref 70–99)
HBA1C MFR BLD: 7.1 % (ref 4.2–6.3)
HCT VFR BLD CALC: 42.6 % (ref 42–52)
HEMOGLOBIN: 13.2 GM/DL (ref 13.5–18)
MCH RBC QN AUTO: 30.1 PG (ref 27–31)
MCHC RBC AUTO-ENTMCNC: 31 % (ref 32–36)
MCV RBC AUTO: 97 FL (ref 78–100)
PDW BLD-RTO: 15.2 % (ref 11.7–14.9)
PLATELET # BLD: 163 K/CU MM (ref 140–440)
PMV BLD AUTO: 10.3 FL (ref 7.5–11.1)
POTASSIUM SERPL-SCNC: 4.7 MMOL/L (ref 3.5–5.1)
RBC # BLD: 4.39 M/CU MM (ref 4.6–6.2)
SODIUM BLD-SCNC: 137 MMOL/L (ref 135–145)
WBC # BLD: 5.9 K/CU MM (ref 4–10.5)

## 2023-08-02 PROCEDURE — 85027 COMPLETE CBC AUTOMATED: CPT

## 2023-08-02 PROCEDURE — 80048 BASIC METABOLIC PNL TOTAL CA: CPT

## 2023-08-02 PROCEDURE — 83036 HEMOGLOBIN GLYCOSYLATED A1C: CPT

## 2023-08-02 PROCEDURE — 36415 COLL VENOUS BLD VENIPUNCTURE: CPT

## 2023-08-09 ENCOUNTER — HOSPITAL ENCOUNTER (OUTPATIENT)
Age: 83
Setting detail: SPECIMEN
Discharge: HOME | End: 2023-08-09
Payer: MEDICARE

## 2023-08-09 LAB
ANION GAP SERPL CALCULATED.3IONS-SCNC: 9 MMOL/L (ref 4–16)
BUN SERPL-MCNC: 12 MG/DL (ref 6–23)
CALCIUM SERPL-MCNC: 8.7 MG/DL (ref 8.3–10.6)
CHLORIDE BLD-SCNC: 100 MMOL/L (ref 99–110)
CO2: 29 MMOL/L (ref 21–32)
CREAT SERPL-MCNC: 1.3 MG/DL (ref 0.9–1.3)
GFR SERPL CREATININE-BSD FRML MDRD: 55 ML/MIN/1.73M2
GLUCOSE SERPL-MCNC: 127 MG/DL (ref 70–99)
POTASSIUM SERPL-SCNC: 4.4 MMOL/L (ref 3.5–5.1)
SODIUM BLD-SCNC: 138 MMOL/L (ref 135–145)

## 2023-08-09 PROCEDURE — 80048 BASIC METABOLIC PNL TOTAL CA: CPT

## 2023-08-09 PROCEDURE — 36415 COLL VENOUS BLD VENIPUNCTURE: CPT

## 2023-08-14 ENCOUNTER — HOSPITAL ENCOUNTER (OUTPATIENT)
Age: 83
Setting detail: SPECIMEN
Discharge: HOME | End: 2023-08-14
Payer: MEDICARE

## 2023-08-14 LAB
ALBUMIN SERPL-MCNC: 3.5 GM/DL (ref 3.4–5)
ALP BLD-CCNC: 103 IU/L (ref 40–128)
ALT SERPL-CCNC: 16 U/L (ref 10–40)
ANION GAP SERPL CALCULATED.3IONS-SCNC: 9 MMOL/L (ref 4–16)
AST SERPL-CCNC: 19 IU/L (ref 15–37)
BILIRUB SERPL-MCNC: 0.5 MG/DL (ref 0–1)
BUN SERPL-MCNC: 14 MG/DL (ref 6–23)
CALCIUM SERPL-MCNC: 8.5 MG/DL (ref 8.3–10.6)
CHLORIDE BLD-SCNC: 100 MMOL/L (ref 99–110)
CO2: 29 MMOL/L (ref 21–32)
CREAT SERPL-MCNC: 1.3 MG/DL (ref 0.9–1.3)
GFR SERPL CREATININE-BSD FRML MDRD: 55 ML/MIN/1.73M2
GLUCOSE SERPL-MCNC: 147 MG/DL (ref 70–99)
HCT VFR BLD CALC: 42.4 % (ref 42–52)
HEMOGLOBIN: 13.6 GM/DL (ref 13.5–18)
MCH RBC QN AUTO: 31.1 PG (ref 27–31)
MCHC RBC AUTO-ENTMCNC: 32.1 % (ref 32–36)
MCV RBC AUTO: 96.8 FL (ref 78–100)
PDW BLD-RTO: 15 % (ref 11.7–14.9)
PLATELET # BLD: 172 K/CU MM (ref 140–440)
PMV BLD AUTO: 10.1 FL (ref 7.5–11.1)
POTASSIUM SERPL-SCNC: 4.2 MMOL/L (ref 3.5–5.1)
RBC # BLD: 4.38 M/CU MM (ref 4.6–6.2)
SODIUM BLD-SCNC: 138 MMOL/L (ref 135–145)
TOTAL PROTEIN: 5.9 GM/DL (ref 6.4–8.2)
WBC # BLD: 6.2 K/CU MM (ref 4–10.5)

## 2023-08-14 PROCEDURE — 36415 COLL VENOUS BLD VENIPUNCTURE: CPT

## 2023-08-14 PROCEDURE — 85027 COMPLETE CBC AUTOMATED: CPT

## 2023-08-14 PROCEDURE — 80053 COMPREHEN METABOLIC PANEL: CPT

## 2023-08-16 ENCOUNTER — HOSPITAL ENCOUNTER (OUTPATIENT)
Age: 83
Setting detail: SPECIMEN
Discharge: HOME | End: 2023-08-16
Payer: MEDICARE

## 2023-08-16 LAB
ANION GAP SERPL CALCULATED.3IONS-SCNC: 8 MMOL/L (ref 4–16)
BUN SERPL-MCNC: 14 MG/DL (ref 6–23)
CALCIUM SERPL-MCNC: 8.4 MG/DL (ref 8.3–10.6)
CHLORIDE BLD-SCNC: 101 MMOL/L (ref 99–110)
CO2: 30 MMOL/L (ref 21–32)
CREAT SERPL-MCNC: 1.3 MG/DL (ref 0.9–1.3)
GFR SERPL CREATININE-BSD FRML MDRD: 55 ML/MIN/1.73M2
GLUCOSE SERPL-MCNC: 174 MG/DL (ref 70–99)
POTASSIUM SERPL-SCNC: 4.3 MMOL/L (ref 3.5–5.1)
SODIUM BLD-SCNC: 139 MMOL/L (ref 135–145)

## 2023-08-16 PROCEDURE — 36415 COLL VENOUS BLD VENIPUNCTURE: CPT

## 2023-08-16 PROCEDURE — 80048 BASIC METABOLIC PNL TOTAL CA: CPT

## 2023-08-23 ENCOUNTER — HOSPITAL ENCOUNTER (OUTPATIENT)
Age: 83
Setting detail: SPECIMEN
Discharge: HOME | End: 2023-08-23
Payer: MEDICARE

## 2023-08-23 LAB
ANION GAP SERPL CALCULATED.3IONS-SCNC: 13 MMOL/L (ref 4–16)
BUN SERPL-MCNC: 17 MG/DL (ref 6–23)
CALCIUM SERPL-MCNC: 8.8 MG/DL (ref 8.3–10.6)
CHLORIDE BLD-SCNC: 101 MMOL/L (ref 99–110)
CO2: 26 MMOL/L (ref 21–32)
CREAT SERPL-MCNC: 1.3 MG/DL (ref 0.9–1.3)
GFR SERPL CREATININE-BSD FRML MDRD: 55 ML/MIN/1.73M2
GLUCOSE SERPL-MCNC: 154 MG/DL (ref 70–99)
POTASSIUM SERPL-SCNC: 4.4 MMOL/L (ref 3.5–5.1)
SODIUM BLD-SCNC: 140 MMOL/L (ref 135–145)

## 2023-08-23 PROCEDURE — 36415 COLL VENOUS BLD VENIPUNCTURE: CPT

## 2023-08-23 PROCEDURE — 80048 BASIC METABOLIC PNL TOTAL CA: CPT

## 2023-10-12 ENCOUNTER — HOSPITAL ENCOUNTER (OUTPATIENT)
Age: 83
Setting detail: SPECIMEN
Discharge: HOME | End: 2023-10-12
Payer: MEDICARE

## 2023-10-12 LAB
ALBUMIN SERPL-MCNC: 3.5 GM/DL (ref 3.4–5)
ALP BLD-CCNC: 113 IU/L (ref 40–129)
ALT SERPL-CCNC: 22 U/L (ref 10–40)
AST SERPL-CCNC: 22 IU/L (ref 15–37)
BILIRUB SERPL-MCNC: 0.5 MG/DL (ref 0–1)
BILIRUBIN DIRECT: 0.2 MG/DL (ref 0–0.3)
BILIRUBIN, INDIRECT: 0.3 MG/DL (ref 0–0.7)
DOSE AMOUNT: ABNORMAL
DOSE TIME: ABNORMAL
TOTAL PROTEIN: 6.3 GM/DL (ref 6.4–8.2)
VALPROIC ACID LEVEL: 26.2 UG/ML (ref 50–100)

## 2023-10-12 PROCEDURE — 80164 ASSAY DIPROPYLACETIC ACD TOT: CPT

## 2023-10-12 PROCEDURE — 36415 COLL VENOUS BLD VENIPUNCTURE: CPT

## 2023-10-12 PROCEDURE — 80076 HEPATIC FUNCTION PANEL: CPT

## 2023-11-01 ENCOUNTER — HOSPITAL ENCOUNTER (OUTPATIENT)
Age: 83
Setting detail: SPECIMEN
Discharge: HOME | End: 2023-11-01
Payer: MEDICARE

## 2023-11-01 LAB
ESTIMATED AVERAGE GLUCOSE: 194 MG/DL
HBA1C MFR BLD: 8.4 % (ref 4.2–6.3)

## 2023-11-01 PROCEDURE — 83036 HEMOGLOBIN GLYCOSYLATED A1C: CPT

## 2023-11-01 PROCEDURE — 36415 COLL VENOUS BLD VENIPUNCTURE: CPT

## 2023-11-21 ENCOUNTER — HOSPITAL ENCOUNTER (OUTPATIENT)
Age: 83
Setting detail: SPECIMEN
Discharge: HOME | End: 2023-11-21
Payer: MEDICARE

## 2023-11-21 PROCEDURE — 81001 URINALYSIS AUTO W/SCOPE: CPT

## 2023-11-21 PROCEDURE — 87086 URINE CULTURE/COLONY COUNT: CPT

## 2023-11-22 ENCOUNTER — HOSPITAL ENCOUNTER (OUTPATIENT)
Age: 83
Setting detail: SPECIMEN
Discharge: HOME | End: 2023-11-22
Payer: MEDICARE

## 2023-11-22 LAB
ANION GAP SERPL CALCULATED.3IONS-SCNC: 12 MMOL/L (ref 4–16)
BACTERIA: NEGATIVE /HPF
BILIRUBIN URINE: NEGATIVE MG/DL
BLOOD, URINE: ABNORMAL
BUN SERPL-MCNC: 16 MG/DL (ref 6–23)
CALCIUM SERPL-MCNC: 8.5 MG/DL (ref 8.3–10.6)
CHLORIDE BLD-SCNC: 98 MMOL/L (ref 99–110)
CLARITY: CLEAR
CO2: 26 MMOL/L (ref 21–32)
COLOR: YELLOW
CREAT SERPL-MCNC: 1.3 MG/DL (ref 0.9–1.3)
GFR SERPL CREATININE-BSD FRML MDRD: 55 ML/MIN/1.73M2
GLUCOSE SERPL-MCNC: 217 MG/DL (ref 70–99)
GLUCOSE, URINE: 100 MG/DL
HCT VFR BLD CALC: 44.3 % (ref 42–52)
HEMOGLOBIN: 13.4 GM/DL (ref 13.5–18)
HYALINE CASTS: 5 /LPF
KETONES, URINE: NEGATIVE MG/DL
LEUKOCYTE ESTERASE, URINE: NEGATIVE
MCH RBC QN AUTO: 30.9 PG (ref 27–31)
MCHC RBC AUTO-ENTMCNC: 30.2 % (ref 32–36)
MCV RBC AUTO: 102.3 FL (ref 78–100)
MUCUS: ABNORMAL HPF
NITRITE URINE, QUANTITATIVE: NEGATIVE
PDW BLD-RTO: 14.5 % (ref 11.7–14.9)
PH, URINE: 6 (ref 5–8)
PLATELET # BLD: 167 K/CU MM (ref 140–440)
PMV BLD AUTO: 10.6 FL (ref 7.5–11.1)
POTASSIUM SERPL-SCNC: 4.5 MMOL/L (ref 3.5–5.1)
PROTEIN UA: ABNORMAL MG/DL
RBC # BLD: 4.33 M/CU MM (ref 4.6–6.2)
RBC URINE: 29 /HPF (ref 0–3)
SODIUM BLD-SCNC: 136 MMOL/L (ref 135–145)
SPECIFIC GRAVITY UA: 1.02 (ref 1–1.03)
SQUAMOUS EPITHELIAL: 1 /HPF
TRICHOMONAS: ABNORMAL /HPF
UROBILINOGEN, URINE: 0.2 MG/DL (ref 0.2–1)
WBC # BLD: 5.2 K/CU MM (ref 4–10.5)
WBC UA: <1 /HPF (ref 0–2)

## 2023-11-22 PROCEDURE — 36415 COLL VENOUS BLD VENIPUNCTURE: CPT

## 2023-11-22 PROCEDURE — 85027 COMPLETE CBC AUTOMATED: CPT

## 2023-11-22 PROCEDURE — 80048 BASIC METABOLIC PNL TOTAL CA: CPT

## 2023-11-23 LAB
CULTURE: NORMAL
Lab: NORMAL
SPECIMEN: NORMAL

## 2024-02-06 ENCOUNTER — HOSPITAL ENCOUNTER (OUTPATIENT)
Age: 84
Setting detail: SPECIMEN
Discharge: HOME | End: 2024-02-06
Payer: MEDICARE

## 2024-02-06 LAB
ALBUMIN SERPL-MCNC: 3.2 GM/DL (ref 3.4–5)
ALP BLD-CCNC: 105 IU/L (ref 40–128)
ALT SERPL-CCNC: 19 U/L (ref 10–40)
ANION GAP SERPL CALCULATED.3IONS-SCNC: 11 MMOL/L (ref 7–16)
AST SERPL-CCNC: 18 IU/L (ref 15–37)
BILIRUB SERPL-MCNC: 0.5 MG/DL (ref 0–1)
BUN SERPL-MCNC: 10 MG/DL (ref 6–23)
CALCIUM SERPL-MCNC: 8.4 MG/DL (ref 8.3–10.6)
CHLORIDE BLD-SCNC: 99 MMOL/L (ref 99–110)
CHOLEST SERPL-MCNC: 104 MG/DL
CO2: 26 MMOL/L (ref 21–32)
CREAT SERPL-MCNC: 1.1 MG/DL (ref 0.9–1.3)
DOSE AMOUNT: ABNORMAL
DOSE TIME: ABNORMAL
ESTIMATED AVERAGE GLUCOSE: 232 MG/DL
GFR SERPL CREATININE-BSD FRML MDRD: >60 ML/MIN/1.73M2
GLUCOSE SERPL-MCNC: 212 MG/DL (ref 70–99)
HBA1C MFR BLD: 9.7 % (ref 4.2–6.3)
HCT VFR BLD CALC: 43 % (ref 42–52)
HEMOGLOBIN: 13.7 GM/DL (ref 13.5–18)
MCH RBC QN AUTO: 31.3 PG (ref 27–31)
MCHC RBC AUTO-ENTMCNC: 31.9 % (ref 32–36)
MCV RBC AUTO: 98.2 FL (ref 78–100)
PDW BLD-RTO: 13.6 % (ref 11.7–14.9)
PLATELET # BLD: 179 K/CU MM (ref 140–440)
PMV BLD AUTO: 10.2 FL (ref 7.5–11.1)
POTASSIUM SERPL-SCNC: 4 MMOL/L (ref 3.5–5.1)
RBC # BLD: 4.38 M/CU MM (ref 4.6–6.2)
SODIUM BLD-SCNC: 136 MMOL/L (ref 135–145)
TOTAL PROTEIN: 5.5 GM/DL (ref 6.4–8.2)
VALPROIC ACID LEVEL: 31 UG/ML (ref 50–100)
WBC # BLD: 6.3 K/CU MM (ref 4–10.5)

## 2024-02-06 PROCEDURE — 80164 ASSAY DIPROPYLACETIC ACD TOT: CPT

## 2024-02-06 PROCEDURE — 83036 HEMOGLOBIN GLYCOSYLATED A1C: CPT

## 2024-02-06 PROCEDURE — 36415 COLL VENOUS BLD VENIPUNCTURE: CPT

## 2024-02-06 PROCEDURE — 82465 ASSAY BLD/SERUM CHOLESTEROL: CPT

## 2024-02-06 PROCEDURE — 80053 COMPREHEN METABOLIC PANEL: CPT

## 2024-02-06 PROCEDURE — 85027 COMPLETE CBC AUTOMATED: CPT

## 2024-05-07 ENCOUNTER — HOSPITAL ENCOUNTER (INPATIENT)
Age: 84
LOS: 15 days | Discharge: OTHER INSTITUTION | DRG: 884 | End: 2024-05-23
Attending: EMERGENCY MEDICINE | Admitting: INTERNAL MEDICINE
Payer: MEDICARE

## 2024-05-07 ENCOUNTER — APPOINTMENT (OUTPATIENT)
Dept: CT IMAGING | Age: 84
DRG: 884 | End: 2024-05-07
Payer: MEDICARE

## 2024-05-07 ENCOUNTER — APPOINTMENT (OUTPATIENT)
Dept: GENERAL RADIOLOGY | Age: 84
DRG: 884 | End: 2024-05-07
Payer: MEDICARE

## 2024-05-07 ENCOUNTER — HOSPITAL ENCOUNTER (OUTPATIENT)
Age: 84
Setting detail: SPECIMEN
Discharge: HOME | End: 2024-05-07
Payer: MEDICARE

## 2024-05-07 DIAGNOSIS — I48.11 LONGSTANDING PERSISTENT ATRIAL FIBRILLATION (HCC): ICD-10-CM

## 2024-05-07 DIAGNOSIS — I50.9 ACUTE CONGESTIVE HEART FAILURE, UNSPECIFIED HEART FAILURE TYPE (HCC): ICD-10-CM

## 2024-05-07 DIAGNOSIS — N17.9 ACUTE KIDNEY INJURY (HCC): ICD-10-CM

## 2024-05-07 DIAGNOSIS — E87.1 HYPONATREMIA: ICD-10-CM

## 2024-05-07 DIAGNOSIS — R41.82 ALTERED MENTAL STATUS, UNSPECIFIED ALTERED MENTAL STATUS TYPE: Primary | ICD-10-CM

## 2024-05-07 DIAGNOSIS — I48.91 ATRIAL FIBRILLATION, UNSPECIFIED TYPE (HCC): ICD-10-CM

## 2024-05-07 DIAGNOSIS — S09.90XA CLOSED HEAD INJURY, INITIAL ENCOUNTER: ICD-10-CM
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
## 2024-05-07 LAB
ALBUMIN SERPL-MCNC: 3.2 GM/DL (ref 3.4–5)
ALP BLD-CCNC: 99 IU/L (ref 40–129)
ALT SERPL-CCNC: 25 U/L (ref 10–40)
ANION GAP SERPL CALCULATED.3IONS-SCNC: 14 MMOL/L (ref 7–16)
AST SERPL-CCNC: 30 IU/L (ref 15–37)
BACTERIA: ABNORMAL /HPF
BASE EXCESS: 1 (ref 0–3)
BASOPHILS ABSOLUTE: 0 K/CU MM
BASOPHILS RELATIVE PERCENT: 0.3 % (ref 0–1)
BILIRUB SERPL-MCNC: 0.8 MG/DL (ref 0–1)
BILIRUBIN, URINE: NEGATIVE MG/DL
BLOOD, URINE: ABNORMAL
BUN SERPL-MCNC: 18 MG/DL (ref 6–23)
CALCIUM SERPL-MCNC: 8.2 MG/DL (ref 8.3–10.6)
CHLORIDE BLD-SCNC: 96 MMOL/L (ref 99–110)
CLARITY: ABNORMAL
CO2: 21 MMOL/L (ref 21–32)
COLOR: YELLOW
CREAT SERPL-MCNC: 1.5 MG/DL (ref 0.9–1.3)
DIFFERENTIAL TYPE: ABNORMAL
EOSINOPHILS ABSOLUTE: 0 K/CU MM
EOSINOPHILS RELATIVE PERCENT: 0.3 % (ref 0–3)
ESTIMATED AVERAGE GLUCOSE: 200 MG/DL
GFR, ESTIMATED: 46 ML/MIN/1.73M2
GLUCOSE BLD-MCNC: 194 MG/DL (ref 70–99)
GLUCOSE SERPL-MCNC: 204 MG/DL (ref 70–99)
GLUCOSE URINE: NEGATIVE MG/DL
GRANULAR CASTS: 2 /LPF
HBA1C MFR BLD: 8.6 % (ref 4.2–6.3)
HCO3 VENOUS: 24.9 MMOL/L (ref 22–29)
HCT VFR BLD CALC: 44.7 % (ref 42–52)
HEMOGLOBIN: 13.9 GM/DL (ref 13.5–18)
HYALINE CASTS: 2 /LPF
IMMATURE NEUTROPHIL %: 0.6 % (ref 0–0.43)
INFLUENZA A ANTIGEN: NOT DETECTED
INFLUENZA B ANTIGEN: NOT DETECTED
INR BLD: 1.4 INDEX
KETONES, URINE: ABNORMAL MG/DL
LACTIC ACID, SEPSIS: 2.3 MMOL/L (ref 0.4–2)
LEUKOCYTE ESTERASE, URINE: NEGATIVE
LIPASE: 13 IU/L (ref 13–60)
LYMPHOCYTES ABSOLUTE: 0.9 K/CU MM
LYMPHOCYTES RELATIVE PERCENT: 7.6 % (ref 24–44)
MAGNESIUM: 2 MG/DL (ref 1.8–2.4)
MCH RBC QN AUTO: 32.3 PG (ref 27–31)
MCHC RBC AUTO-ENTMCNC: 31.1 % (ref 32–36)
MCV RBC AUTO: 103.7 FL (ref 78–100)
MONOCYTES ABSOLUTE: 1.4 K/CU MM
MONOCYTES RELATIVE PERCENT: 11.7 % (ref 0–4)
MUCUS: ABNORMAL HPF
NEUTROPHILS ABSOLUTE: 9.6 K/CU MM
NEUTROPHILS RELATIVE PERCENT: 79.5 % (ref 36–66)
NITRITE URINE, QUANTITATIVE: NEGATIVE
NUCLEATED RBC %: 0 %
O2 SAT, VEN: 88.1 % (ref 50–70)
PCO2, VEN: 43 MMHG (ref 41–51)
PDW BLD-RTO: 14.8 % (ref 11.7–14.9)
PH VENOUS: 7.37 (ref 7.32–7.43)
PH, URINE: 6 (ref 5–8)
PLATELET # BLD: 123 K/CU MM (ref 140–440)
PMV BLD AUTO: 10.4 FL (ref 7.5–11.1)
PO2, VEN: 61 MMHG (ref 28–48)
POTASSIUM SERPL-SCNC: 4.2 MMOL/L (ref 3.5–5.1)
PRO-BNP: 6805 PG/ML
PROTEIN UA: 100 MG/DL
PROTHROMBIN TIME: 17.1 SECONDS (ref 11.7–14.5)
RBC # BLD: 4.31 M/CU MM (ref 4.6–6.2)
RBC URINE: 592 /HPF (ref 0–3)
SARS-COV-2 RDRP RESP QL NAA+PROBE: NOT DETECTED
SODIUM BLD-SCNC: 131 MMOL/L (ref 135–145)
SOURCE: NORMAL
SPECIFIC GRAVITY UA: 1.02 (ref 1–1.03)
SQUAMOUS EPITHELIAL: <1 /HPF
TOTAL IMMATURE NEUTOROPHIL: 0.07 K/CU MM
TOTAL NUCLEATED RBC: 0 K/CU MM
TOTAL PROTEIN: 6.3 GM/DL (ref 6.4–8.2)
TRICHOMONAS: ABNORMAL /HPF
TROPONIN, HIGH SENSITIVITY: 106 NG/L (ref 0–22)
TROPONIN, HIGH SENSITIVITY: 93 NG/L (ref 0–22)
UROBILINOGEN, URINE: 0.2 MG/DL (ref 0.2–1)
WBC # BLD: 12 K/CU MM (ref 4–10.5)
WBC UA: 4 /HPF (ref 0–2)
YEAST: ABNORMAL /HPF

## 2024-05-07 PROCEDURE — 87040 BLOOD CULTURE FOR BACTERIA: CPT

## 2024-05-07 PROCEDURE — 93005 ELECTROCARDIOGRAM TRACING: CPT | Performed by: EMERGENCY MEDICINE

## 2024-05-07 PROCEDURE — 81001 URINALYSIS AUTO W/SCOPE: CPT

## 2024-05-07 PROCEDURE — 83690 ASSAY OF LIPASE: CPT

## 2024-05-07 PROCEDURE — 51701 INSERT BLADDER CATHETER: CPT

## 2024-05-07 PROCEDURE — 70450 CT HEAD/BRAIN W/O DYE: CPT

## 2024-05-07 PROCEDURE — 82805 BLOOD GASES W/O2 SATURATION: CPT

## 2024-05-07 PROCEDURE — 85610 PROTHROMBIN TIME: CPT

## 2024-05-07 PROCEDURE — 87635 SARS-COV-2 COVID-19 AMP PRB: CPT

## 2024-05-07 PROCEDURE — 87086 URINE CULTURE/COLONY COUNT: CPT

## 2024-05-07 PROCEDURE — 82962 GLUCOSE BLOOD TEST: CPT

## 2024-05-07 PROCEDURE — 70498 CT ANGIOGRAPHY NECK: CPT

## 2024-05-07 PROCEDURE — 96375 TX/PRO/DX INJ NEW DRUG ADDON: CPT

## 2024-05-07 PROCEDURE — 99285 EMERGENCY DEPT VISIT HI MDM: CPT

## 2024-05-07 PROCEDURE — 83605 ASSAY OF LACTIC ACID: CPT

## 2024-05-07 PROCEDURE — 36415 COLL VENOUS BLD VENIPUNCTURE: CPT

## 2024-05-07 PROCEDURE — 80053 COMPREHEN METABOLIC PANEL: CPT

## 2024-05-07 PROCEDURE — 84484 ASSAY OF TROPONIN QUANT: CPT

## 2024-05-07 PROCEDURE — 72125 CT NECK SPINE W/O DYE: CPT

## 2024-05-07 PROCEDURE — 96365 THER/PROPH/DIAG IV INF INIT: CPT

## 2024-05-07 PROCEDURE — 6370000000 HC RX 637 (ALT 250 FOR IP): Performed by: EMERGENCY MEDICINE

## 2024-05-07 PROCEDURE — 6360000004 HC RX CONTRAST MEDICATION: Performed by: EMERGENCY MEDICINE

## 2024-05-07 PROCEDURE — 84145 PROCALCITONIN (PCT): CPT

## 2024-05-07 PROCEDURE — 83036 HEMOGLOBIN GLYCOSYLATED A1C: CPT

## 2024-05-07 PROCEDURE — 2580000003 HC RX 258: Performed by: EMERGENCY MEDICINE

## 2024-05-07 PROCEDURE — 6360000002 HC RX W HCPCS: Performed by: EMERGENCY MEDICINE

## 2024-05-07 PROCEDURE — 71045 X-RAY EXAM CHEST 1 VIEW: CPT

## 2024-05-07 PROCEDURE — 83735 ASSAY OF MAGNESIUM: CPT

## 2024-05-07 PROCEDURE — 83880 ASSAY OF NATRIURETIC PEPTIDE: CPT

## 2024-05-07 PROCEDURE — 87502 INFLUENZA DNA AMP PROBE: CPT

## 2024-05-07 PROCEDURE — 85025 COMPLETE CBC W/AUTO DIFF WBC: CPT

## 2024-05-07 RX ORDER — ACETAMINOPHEN 325 MG/1
650 TABLET ORAL ONCE
Status: COMPLETED | OUTPATIENT
Start: 2024-05-07 | End: 2024-05-07

## 2024-05-07 RX ORDER — FUROSEMIDE 10 MG/ML
40 INJECTION INTRAMUSCULAR; INTRAVENOUS ONCE
Status: COMPLETED | OUTPATIENT
Start: 2024-05-07 | End: 2024-05-07

## 2024-05-07 RX ADMIN — AZITHROMYCIN MONOHYDRATE 500 MG: 500 INJECTION, POWDER, LYOPHILIZED, FOR SOLUTION INTRAVENOUS at 23:15

## 2024-05-07 RX ADMIN — CEFTRIAXONE 1000 MG: 1 INJECTION, POWDER, FOR SOLUTION INTRAMUSCULAR; INTRAVENOUS at 23:08

## 2024-05-07 RX ADMIN — IOPAMIDOL 90 ML: 755 INJECTION, SOLUTION INTRAVENOUS at 19:45

## 2024-05-07 RX ADMIN — FUROSEMIDE 40 MG: 10 INJECTION, SOLUTION INTRAMUSCULAR; INTRAVENOUS at 23:01

## 2024-05-07 RX ADMIN — ACETAMINOPHEN 650 MG: 325 TABLET ORAL at 23:02

## 2024-05-07 ASSESSMENT — PAIN SCALES - GENERAL: PAINLEVEL_OUTOF10: 0

## 2024-05-08 ENCOUNTER — APPOINTMENT (OUTPATIENT)
Dept: MRI IMAGING | Age: 84
DRG: 884 | End: 2024-05-08
Payer: MEDICARE

## 2024-05-08 ENCOUNTER — APPOINTMENT (OUTPATIENT)
Dept: NON INVASIVE DIAGNOSTICS | Age: 84
DRG: 884 | End: 2024-05-08
Attending: INTERNAL MEDICINE
Payer: MEDICARE

## 2024-05-08 PROBLEM — G93.40 ACUTE ENCEPHALOPATHY: Status: ACTIVE | Noted: 2024-05-08

## 2024-05-08 PROBLEM — R41.82 ALTERED MENTAL STATUS: Status: ACTIVE | Noted: 2024-05-08

## 2024-05-08 PROBLEM — I82.90 THROMBUS: Status: ACTIVE | Noted: 2024-05-08

## 2024-05-08 PROBLEM — J18.9 PNEUMONIA DUE TO INFECTIOUS ORGANISM: Status: ACTIVE | Noted: 2024-05-08

## 2024-05-08 PROBLEM — I48.91 ATRIAL FIBRILLATION (HCC): Status: ACTIVE | Noted: 2024-05-08

## 2024-05-08 LAB
ANION GAP SERPL CALCULATED.3IONS-SCNC: 12 MMOL/L (ref 7–16)
BUN SERPL-MCNC: 18 MG/DL (ref 6–23)
CALCIUM SERPL-MCNC: 8.2 MG/DL (ref 8.3–10.6)
CHLORIDE BLD-SCNC: 99 MMOL/L (ref 99–110)
CHOLEST SERPL-MCNC: 107 MG/DL
CO2: 25 MMOL/L (ref 21–32)
CREAT SERPL-MCNC: 1.5 MG/DL (ref 0.9–1.3)
CULTURE: NORMAL
ECHO AV AREA PEAK VELOCITY: 1.9 CM2
ECHO AV AREA VTI: 2 CM2
ECHO AV AREA/BSA PEAK VELOCITY: 0.8 CM2/M2
ECHO AV AREA/BSA VTI: 0.9 CM2/M2
ECHO AV MEAN GRADIENT: 4 MMHG
ECHO AV MEAN VELOCITY: 0.9 M/S
ECHO AV PEAK GRADIENT: 8 MMHG
ECHO AV PEAK VELOCITY: 1.4 M/S
ECHO AV VELOCITY RATIO: 0.57
ECHO AV VTI: 26.1 CM
ECHO BSA: 2.29 M2
ECHO EST RA PRESSURE: 3 MMHG
ECHO LA AREA 4C: 31.2 CM2
ECHO LA DIAMETER INDEX: 2.21 CM/M2
ECHO LA DIAMETER: 5 CM
ECHO LA MAJOR AXIS: 6.7 CM
ECHO LA VOL MOD A4C: 116 ML (ref 18–58)
ECHO LA VOLUME INDEX MOD A4C: 51 ML/M2 (ref 16–34)
ECHO LV E' LATERAL VELOCITY: 5 CM/S
ECHO LV E' SEPTAL VELOCITY: 6 CM/S
ECHO LV FRACTIONAL SHORTENING: 26 % (ref 28–44)
ECHO LV INTERNAL DIMENSION DIASTOLE INDEX: 3.05 CM/M2
ECHO LV INTERNAL DIMENSION DIASTOLIC: 6.9 CM (ref 4.2–5.9)
ECHO LV INTERNAL DIMENSION SYSTOLIC INDEX: 2.26 CM/M2
ECHO LV INTERNAL DIMENSION SYSTOLIC: 5.1 CM
ECHO LV IVSD: 0.8 CM (ref 0.6–1)
ECHO LV MASS 2D: 294.3 G (ref 88–224)
ECHO LV MASS INDEX 2D: 130.2 G/M2 (ref 49–115)
ECHO LV POSTERIOR WALL DIASTOLIC: 1.1 CM (ref 0.6–1)
ECHO LV RELATIVE WALL THICKNESS RATIO: 0.32
ECHO LVOT AREA: 3.1 CM2
ECHO LVOT AV VTI INDEX: 0.62
ECHO LVOT DIAM: 2 CM
ECHO LVOT MEAN GRADIENT: 1 MMHG
ECHO LVOT PEAK GRADIENT: 3 MMHG
ECHO LVOT PEAK VELOCITY: 0.8 M/S
ECHO LVOT STROKE VOLUME INDEX: 22.5 ML/M2
ECHO LVOT SV: 50.9 ML
ECHO LVOT VTI: 16.2 CM
ECHO MV A VELOCITY: 0.64 M/S
ECHO MV E DECELERATION TIME (DT): 165 MS
ECHO MV E VELOCITY: 0.81 M/S
ECHO MV E/A RATIO: 1.27
ECHO MV E/E' LATERAL: 16.2
ECHO MV E/E' RATIO (AVERAGED): 14.85
ECHO PULMONARY ARTERY END DIASTOLIC PRESSURE: 10 MMHG
ECHO PV REGURGITANT MAX VELOCITY: 1.6 M/S
ECHO RIGHT VENTRICULAR SYSTOLIC PRESSURE (RVSP): 28 MMHG
ECHO TV REGURGITANT MAX VELOCITY: 2.5 M/S
ECHO TV REGURGITANT PEAK GRADIENT: 25 MMHG
EKG ATRIAL RATE: 110 BPM
EKG ATRIAL RATE: 110 BPM
EKG DIAGNOSIS: NORMAL
EKG DIAGNOSIS: NORMAL
EKG P-R INTERVAL: 224 MS
EKG Q-T INTERVAL: 388 MS
EKG Q-T INTERVAL: 402 MS
EKG QRS DURATION: 158 MS
EKG QRS DURATION: 168 MS
EKG QTC CALCULATION (BAZETT): 525 MS
EKG QTC CALCULATION (BAZETT): 538 MS
EKG R AXIS: -70 DEGREES
EKG R AXIS: -75 DEGREES
EKG T AXIS: 33 DEGREES
EKG T AXIS: 36 DEGREES
EKG VENTRICULAR RATE: 108 BPM
EKG VENTRICULAR RATE: 110 BPM
ESTIMATED AVERAGE GLUCOSE: 197 MG/DL
GFR, ESTIMATED: 46 ML/MIN/1.73M2
GLUCOSE BLD-MCNC: 131 MG/DL (ref 70–99)
GLUCOSE BLD-MCNC: 141 MG/DL (ref 70–99)
GLUCOSE BLD-MCNC: 141 MG/DL (ref 70–99)
GLUCOSE BLD-MCNC: 147 MG/DL (ref 70–99)
GLUCOSE SERPL-MCNC: 153 MG/DL (ref 70–99)
HBA1C MFR BLD: 8.5 % (ref 4.2–6.3)
HCT VFR BLD CALC: 41.2 % (ref 42–52)
HDLC SERPL-MCNC: 31 MG/DL
HEMOGLOBIN: 13.5 GM/DL (ref 13.5–18)
LACTIC ACID, SEPSIS: 1.7 MMOL/L (ref 0.4–2)
LDLC SERPL CALC-MCNC: 56 MG/DL
Lab: NORMAL
MCH RBC QN AUTO: 32 PG (ref 27–31)
MCHC RBC AUTO-ENTMCNC: 32.8 % (ref 32–36)
MCV RBC AUTO: 97.6 FL (ref 78–100)
MRSA, DNA, NASAL: ABNORMAL
PDW BLD-RTO: 15.1 % (ref 11.7–14.9)
PLATELET # BLD: 134 K/CU MM (ref 140–440)
PMV BLD AUTO: 10.3 FL (ref 7.5–11.1)
POTASSIUM SERPL-SCNC: 4.2 MMOL/L (ref 3.5–5.1)
PROCALCITONIN SERPL-MCNC: 0.2 NG/ML
RBC # BLD: 4.22 M/CU MM (ref 4.6–6.2)
SODIUM BLD-SCNC: 136 MMOL/L (ref 135–145)
SPECIMEN DESCRIPTION: ABNORMAL
SPECIMEN: NORMAL
TRIGL SERPL-MCNC: 102 MG/DL
TROPONIN, HIGH SENSITIVITY: 133 NG/L (ref 0–22)
TROPONIN, HIGH SENSITIVITY: 145 NG/L (ref 0–22)
TROPONIN, HIGH SENSITIVITY: 149 NG/L (ref 0–22)
TROPONIN, HIGH SENSITIVITY: 150 NG/L (ref 0–22)
WBC # BLD: 11 K/CU MM (ref 4–10.5)

## 2024-05-08 PROCEDURE — 99223 1ST HOSP IP/OBS HIGH 75: CPT | Performed by: STUDENT IN AN ORGANIZED HEALTH CARE EDUCATION/TRAINING PROGRAM

## 2024-05-08 PROCEDURE — 80061 LIPID PANEL: CPT

## 2024-05-08 PROCEDURE — 6360000004 HC RX CONTRAST MEDICATION: Performed by: INTERNAL MEDICINE

## 2024-05-08 PROCEDURE — 6370000000 HC RX 637 (ALT 250 FOR IP): Performed by: INTERNAL MEDICINE

## 2024-05-08 PROCEDURE — C8929 TTE W OR WO FOL WCON,DOPPLER: HCPCS

## 2024-05-08 PROCEDURE — 83036 HEMOGLOBIN GLYCOSYLATED A1C: CPT

## 2024-05-08 PROCEDURE — 99222 1ST HOSP IP/OBS MODERATE 55: CPT | Performed by: NURSE PRACTITIONER

## 2024-05-08 PROCEDURE — 99223 1ST HOSP IP/OBS HIGH 75: CPT | Performed by: INTERNAL MEDICINE

## 2024-05-08 PROCEDURE — 6360000002 HC RX W HCPCS: Performed by: INTERNAL MEDICINE

## 2024-05-08 PROCEDURE — 93010 ELECTROCARDIOGRAM REPORT: CPT | Performed by: INTERNAL MEDICINE

## 2024-05-08 PROCEDURE — 36415 COLL VENOUS BLD VENIPUNCTURE: CPT

## 2024-05-08 PROCEDURE — 92610 EVALUATE SWALLOWING FUNCTION: CPT

## 2024-05-08 PROCEDURE — 70551 MRI BRAIN STEM W/O DYE: CPT

## 2024-05-08 PROCEDURE — 82962 GLUCOSE BLOOD TEST: CPT

## 2024-05-08 PROCEDURE — 2700000000 HC OXYGEN THERAPY PER DAY

## 2024-05-08 PROCEDURE — 80048 BASIC METABOLIC PNL TOTAL CA: CPT

## 2024-05-08 PROCEDURE — 94761 N-INVAS EAR/PLS OXIMETRY MLT: CPT

## 2024-05-08 PROCEDURE — 1200000000 HC SEMI PRIVATE

## 2024-05-08 PROCEDURE — 85027 COMPLETE CBC AUTOMATED: CPT

## 2024-05-08 PROCEDURE — 87641 MR-STAPH DNA AMP PROBE: CPT

## 2024-05-08 PROCEDURE — 51798 US URINE CAPACITY MEASURE: CPT

## 2024-05-08 PROCEDURE — 2580000003 HC RX 258: Performed by: STUDENT IN AN ORGANIZED HEALTH CARE EDUCATION/TRAINING PROGRAM

## 2024-05-08 PROCEDURE — 93306 TTE W/DOPPLER COMPLETE: CPT | Performed by: INTERNAL MEDICINE

## 2024-05-08 PROCEDURE — 84484 ASSAY OF TROPONIN QUANT: CPT

## 2024-05-08 PROCEDURE — 51701 INSERT BLADDER CATHETER: CPT

## 2024-05-08 PROCEDURE — 2580000003 HC RX 258: Performed by: INTERNAL MEDICINE

## 2024-05-08 PROCEDURE — 6360000002 HC RX W HCPCS: Performed by: STUDENT IN AN ORGANIZED HEALTH CARE EDUCATION/TRAINING PROGRAM

## 2024-05-08 RX ORDER — POLYETHYLENE GLYCOL 3350 17 G/17G
17 POWDER, FOR SOLUTION ORAL DAILY PRN
Status: DISCONTINUED | OUTPATIENT
Start: 2024-05-08 | End: 2024-05-23 | Stop reason: HOSPADM

## 2024-05-08 RX ORDER — INSULIN LISPRO 100 [IU]/ML
0-4 INJECTION, SOLUTION INTRAVENOUS; SUBCUTANEOUS NIGHTLY
Status: DISCONTINUED | OUTPATIENT
Start: 2024-05-08 | End: 2024-05-23 | Stop reason: HOSPADM

## 2024-05-08 RX ORDER — PROCHLORPERAZINE MALEATE 5 MG/1
10 TABLET ORAL EVERY 8 HOURS PRN
Status: DISCONTINUED | OUTPATIENT
Start: 2024-05-08 | End: 2024-05-23 | Stop reason: HOSPADM

## 2024-05-08 RX ORDER — POTASSIUM CHLORIDE 1.5 G/1.58G
20 POWDER, FOR SOLUTION ORAL DAILY
COMMUNITY

## 2024-05-08 RX ORDER — VITAMIN B COMPLEX
1 CAPSULE ORAL
Status: DISCONTINUED | OUTPATIENT
Start: 2024-05-08 | End: 2024-05-23 | Stop reason: HOSPADM

## 2024-05-08 RX ORDER — ONDANSETRON 2 MG/ML
4 INJECTION INTRAMUSCULAR; INTRAVENOUS EVERY 6 HOURS PRN
Status: DISCONTINUED | OUTPATIENT
Start: 2024-05-08 | End: 2024-05-08

## 2024-05-08 RX ORDER — SODIUM CHLORIDE 9 MG/ML
INJECTION, SOLUTION INTRAVENOUS PRN
Status: DISCONTINUED | OUTPATIENT
Start: 2024-05-08 | End: 2024-05-23 | Stop reason: HOSPADM

## 2024-05-08 RX ORDER — PANTOPRAZOLE SODIUM 40 MG/1
40 TABLET, DELAYED RELEASE ORAL
Status: DISCONTINUED | OUTPATIENT
Start: 2024-05-08 | End: 2024-05-23 | Stop reason: HOSPADM

## 2024-05-08 RX ORDER — ATORVASTATIN CALCIUM 40 MG/1
80 TABLET, FILM COATED ORAL NIGHTLY
Status: DISCONTINUED | OUTPATIENT
Start: 2024-05-08 | End: 2024-05-23 | Stop reason: HOSPADM

## 2024-05-08 RX ORDER — FUROSEMIDE 20 MG/1
20 TABLET ORAL DAILY
COMMUNITY

## 2024-05-08 RX ORDER — SODIUM CHLORIDE 0.9 % (FLUSH) 0.9 %
5-40 SYRINGE (ML) INJECTION PRN
Status: DISCONTINUED | OUTPATIENT
Start: 2024-05-08 | End: 2024-05-23 | Stop reason: HOSPADM

## 2024-05-08 RX ORDER — GLIPIZIDE 5 MG/1
5 TABLET ORAL
COMMUNITY

## 2024-05-08 RX ORDER — ASPIRIN 300 MG/1
300 SUPPOSITORY RECTAL DAILY
Status: DISCONTINUED | OUTPATIENT
Start: 2024-05-08 | End: 2024-05-23 | Stop reason: HOSPADM

## 2024-05-08 RX ORDER — POLYETHYLENE GLYCOL 3350 17 G/17G
17 POWDER, FOR SOLUTION ORAL DAILY
Status: DISCONTINUED | OUTPATIENT
Start: 2024-05-08 | End: 2024-05-23 | Stop reason: HOSPADM

## 2024-05-08 RX ORDER — LANOLIN ALCOHOL/MO/W.PET/CERES
6 CREAM (GRAM) TOPICAL NIGHTLY PRN
Status: DISCONTINUED | OUTPATIENT
Start: 2024-05-08 | End: 2024-05-23 | Stop reason: HOSPADM

## 2024-05-08 RX ORDER — VITAMIN B COMPLEX
5000 TABLET ORAL DAILY
Status: DISCONTINUED | OUTPATIENT
Start: 2024-05-08 | End: 2024-05-23 | Stop reason: HOSPADM

## 2024-05-08 RX ORDER — FUROSEMIDE 10 MG/ML
40 INJECTION INTRAMUSCULAR; INTRAVENOUS DAILY
Status: DISCONTINUED | OUTPATIENT
Start: 2024-05-08 | End: 2024-05-10

## 2024-05-08 RX ORDER — DIVALPROEX SODIUM 125 MG/1
500 CAPSULE, COATED PELLETS ORAL NIGHTLY
Status: DISCONTINUED | OUTPATIENT
Start: 2024-05-08 | End: 2024-05-15

## 2024-05-08 RX ORDER — INSULIN LISPRO 100 [IU]/ML
0-8 INJECTION, SOLUTION INTRAVENOUS; SUBCUTANEOUS
Status: DISCONTINUED | OUTPATIENT
Start: 2024-05-08 | End: 2024-05-23 | Stop reason: HOSPADM

## 2024-05-08 RX ORDER — SODIUM CHLORIDE 0.9 % (FLUSH) 0.9 %
5-40 SYRINGE (ML) INJECTION EVERY 12 HOURS SCHEDULED
Status: DISCONTINUED | OUTPATIENT
Start: 2024-05-08 | End: 2024-05-23 | Stop reason: HOSPADM

## 2024-05-08 RX ORDER — ONDANSETRON 4 MG/1
4 TABLET, ORALLY DISINTEGRATING ORAL EVERY 8 HOURS PRN
Status: DISCONTINUED | OUTPATIENT
Start: 2024-05-08 | End: 2024-05-08

## 2024-05-08 RX ORDER — DOCUSATE SODIUM 100 MG/1
100 CAPSULE, LIQUID FILLED ORAL DAILY
Status: DISCONTINUED | OUTPATIENT
Start: 2024-05-08 | End: 2024-05-23 | Stop reason: HOSPADM

## 2024-05-08 RX ORDER — GLUCAGON 1 MG/ML
1 KIT INJECTION PRN
Status: DISCONTINUED | OUTPATIENT
Start: 2024-05-08 | End: 2024-05-23 | Stop reason: HOSPADM

## 2024-05-08 RX ORDER — CARBOXYMETHYLCELLULOSE SODIUM 10 MG/ML
1 GEL OPHTHALMIC 3 TIMES DAILY
Status: DISCONTINUED | OUTPATIENT
Start: 2024-05-08 | End: 2024-05-23 | Stop reason: HOSPADM

## 2024-05-08 RX ORDER — DIVALPROEX SODIUM 125 MG/1
125 CAPSULE, COATED PELLETS ORAL DAILY
Status: DISCONTINUED | OUTPATIENT
Start: 2024-05-08 | End: 2024-05-15

## 2024-05-08 RX ORDER — ASPIRIN 81 MG/1
81 TABLET, CHEWABLE ORAL DAILY
Status: DISCONTINUED | OUTPATIENT
Start: 2024-05-08 | End: 2024-05-23 | Stop reason: HOSPADM

## 2024-05-08 RX ORDER — DIVALPROEX SODIUM 125 MG/1
125 TABLET, DELAYED RELEASE ORAL DAILY
Status: ON HOLD | COMMUNITY
End: 2024-05-21

## 2024-05-08 RX ORDER — PROCHLORPERAZINE EDISYLATE 5 MG/ML
10 INJECTION INTRAMUSCULAR; INTRAVENOUS EVERY 6 HOURS PRN
Status: DISCONTINUED | OUTPATIENT
Start: 2024-05-08 | End: 2024-05-23 | Stop reason: HOSPADM

## 2024-05-08 RX ORDER — DEXTROSE MONOHYDRATE 100 MG/ML
INJECTION, SOLUTION INTRAVENOUS CONTINUOUS PRN
Status: DISCONTINUED | OUTPATIENT
Start: 2024-05-08 | End: 2024-05-23 | Stop reason: HOSPADM

## 2024-05-08 RX ADMIN — Medication 5000 UNITS: at 09:05

## 2024-05-08 RX ADMIN — POLYETHYLENE GLYCOL (3350) 17 G: 17 POWDER, FOR SOLUTION ORAL at 09:07

## 2024-05-08 RX ADMIN — SODIUM CHLORIDE, PRESERVATIVE FREE 10 ML: 5 INJECTION INTRAVENOUS at 10:08

## 2024-05-08 RX ADMIN — ATORVASTATIN CALCIUM 80 MG: 40 TABLET, FILM COATED ORAL at 20:46

## 2024-05-08 RX ADMIN — CARBOXYMETHYLCELLULOSE SODIUM 1 DROP: 10 GEL OPHTHALMIC at 16:43

## 2024-05-08 RX ADMIN — CEFTRIAXONE 1000 MG: 1 INJECTION, POWDER, FOR SOLUTION INTRAMUSCULAR; INTRAVENOUS at 20:46

## 2024-05-08 RX ADMIN — FUROSEMIDE 40 MG: 10 INJECTION, SOLUTION INTRAMUSCULAR; INTRAVENOUS at 09:18

## 2024-05-08 RX ADMIN — DIVALPROEX SODIUM 125 MG: 125 CAPSULE, COATED PELLETS ORAL at 10:03

## 2024-05-08 RX ADMIN — DOCUSATE SODIUM 100 MG: 100 CAPSULE, LIQUID FILLED ORAL at 09:04

## 2024-05-08 RX ADMIN — PERFLUTREN 1.5 ML: 6.52 INJECTION, SUSPENSION INTRAVENOUS at 14:02

## 2024-05-08 RX ADMIN — VITAMIN B COMPLEX 1 CAPSULE: at 09:04

## 2024-05-08 RX ADMIN — PANTOPRAZOLE SODIUM 40 MG: 40 TABLET, DELAYED RELEASE ORAL at 05:13

## 2024-05-08 RX ADMIN — AZITHROMYCIN MONOHYDRATE 500 MG: 500 INJECTION, POWDER, LYOPHILIZED, FOR SOLUTION INTRAVENOUS at 21:12

## 2024-05-08 RX ADMIN — WATER 2.5 MG: 1 INJECTION INTRAMUSCULAR; INTRAVENOUS; SUBCUTANEOUS at 21:50

## 2024-05-08 RX ADMIN — ASPIRIN 81 MG: 81 TABLET, CHEWABLE ORAL at 09:06

## 2024-05-08 RX ADMIN — APIXABAN 2.5 MG: 2.5 TABLET, FILM COATED ORAL at 09:06

## 2024-05-08 RX ADMIN — CARBOXYMETHYLCELLULOSE SODIUM 1 DROP: 10 GEL OPHTHALMIC at 10:04

## 2024-05-08 RX ADMIN — Medication 6 MG: at 21:13

## 2024-05-08 RX ADMIN — POTASSIUM BICARBONATE 20 MEQ: 782 TABLET, EFFERVESCENT ORAL at 10:07

## 2024-05-08 NOTE — CONSULTS
Vascular/Interventional Neurology Service Consult Note  [facility]   Patient Name: Eliazar Ugarte  : 1940        Subjective:   Reason for consult:   Eliazar Ugarte is a 83 -year-old male with a pmh of a-fib, IPH right temporal lobe , right internal capsule ischemic stroke, dementia, DM2, HTN, and HLD presenting to [facility] with stroke-like symptoms.  The patient presented from the EMS following fall, and altered mental status.  EMS administered Adenosine after finding the patient in SVT.  A stroke alert was called upon arrival to the ED and [facility] was consulted.  The patient was noted to have a left facial droop, and was not following commands. He was not a candidate for TNK due to being on anticoagulation and out of therapeutic window.  CT of the head was non-acute.  CTA approximately 40% diameter stenosis and small focal adherent thrombus seen within the lumen of the proximal left internal carotid artery.  No significant enhancement seen of the dural venous sinuses. This may be due to early phase of contrast but thrombus is not excluded.   Chest x-ray-right upper lobe suprahilar airspace disease.  Blood cultures sent and he was started on antibiotics Azithromycin and Rocephin.      Past Medical History:   Diagnosis Date    Atrial fibrillation (HCC)     Cerebral artery occlusion with cerebral infarction (HCC)     Diabetes mellitus (HCC)     Hyperlipidemia     Hypertension     :   History reviewed. No pertinent surgical history.  Medications:  Scheduled Meds:   sodium chloride flush  5-40 mL IntraVENous 2 times per day    aspirin  81 mg Oral Daily    Or    aspirin  300 mg Rectal Daily    atorvastatin  80 mg Oral Nightly    apixaban  2.5 mg Oral BID    carboxymethylcellulose  1 drop Both Eyes TID    Vitamin D  5,000 Units Oral Daily    divalproex  125 mg Oral Daily    divalproex  500 mg Oral Nightly    docusate sodium  100 mg Oral Daily    b complex vitamins  1  capsule Oral Daily with breakfast    pantoprazole  40 mg Oral QAM AC    polyethylene glycol  17 g Oral Daily    potassium bicarb-citric acid  20 mEq Oral Daily    furosemide  40 mg IntraVENous Daily    insulin lispro  0-8 Units SubCUTAneous TID WC    insulin lispro  0-4 Units SubCUTAneous Nightly    cefTRIAXone (ROCEPHIN) IV  1,000 mg IntraVENous Q24H    azithromycin  500 mg IntraVENous Q24H     Continuous Infusions:   sodium chloride      dextrose       PRN Meds:.sodium chloride flush, sodium chloride, polyethylene glycol, melatonin, glucose, dextrose bolus **OR** dextrose bolus, glucagon (rDNA), dextrose, prochlorperazine **OR** prochlorperazine    No Known Allergies  Social History     Socioeconomic History    Marital status:      Spouse name: Not on file    Number of children: Not on file    Years of education: Not on file    Highest education level: Not on file   Occupational History    Not on file   Tobacco Use    Smoking status: Former     Types: Cigarettes    Smokeless tobacco: Former   Substance and Sexual Activity    Alcohol use: Not Currently    Drug use: Never    Sexual activity: Not Currently   Other Topics Concern    Not on file   Social History Narrative    Not on file     Social Determinants of Health     Financial Resource Strain: Not on file   Food Insecurity: Not on file   Transportation Needs: Not on file   Physical Activity: Not on file   Stress: Not on file   Social Connections: Not on file   Intimate Partner Violence: Not on file   Housing Stability: Not on file      History reviewed. No pertinent family history.    Review of Symptoms:    10-point system review completed. All of which are negative except as mentioned above.    Physical Exam:       Vitals:    05/08/24 0745   BP: 114/72   Pulse: 83   Resp: 18   Temp: 98.1 °F (36.7 °C)   SpO2:          Gen: A&O x 2, NAD, cooperative  HEENT: NC/AT, EOMI, PERRL, mmm, no carotid bruits, neck supple, no meningeal signs; Fundoscopic: no disc  of stroke or worsening.  Other risk include but are not limited to infection, dissection, radiation injury, hemorrhage, contrast reactions, nephrotoxicity and death.     Thank you for allowing us to participate in the care of your patient.  If there are any questions regarding evaluation please feel free to contact us.     Winter Lim, APRN - CNP, 5/8/2024

## 2024-05-08 NOTE — PROGRESS NOTES
Physical Therapy    Chart review complete. Attempted to see patient at 1347 for PT/OT initial evaluation, however, was with diagnostic testing; and labs also waiting to see patient.     Attempted patient again at 1419, RN reports that pt is about to go for an MRI.     PT/OT will re-attempt at a later time.     Vinh Cedeño, PT

## 2024-05-08 NOTE — CONSULTS
Neurology Service Consult Note  [facility]   Patient Name: Eliazar Ugarte  : 1940        Subjective:   Reason for consult: Altered mental status, fall, extremity weakness and concern for stroke  83 y.o.  male with history of a fib on Eliquis 2.5 mg twice daily, stroke, DM, HLD, HTN, dementia presenting to [facility] from facility via EMS with complaints of AMS, fall and weakness. EMS noted left facial weakness. Patient was noted to be in SVT for EMS who administered two doses of adenosine, 6 mg and 12 mg respectively en route. Stroke alert was called and patient was evaluated by [facility] tele neurology service, initial [facility] 8, intervention was not recommended as patient is on anticoagulation.     Chart was reviewed in detail, patient was seen and assessed. He is resting in bed awake and alert to self only. He is very hard of hearing, tells me he has hearing aids but is not allowed to wear them due to was build up. He is able to tell me  he lives at the [facility] but is not sure where he is today. Unable to identify month or year. He can identify common objects and colors. No family at bedside to help provide history.     Past Medical History:   Diagnosis Date    Atrial fibrillation (HCC)     Cerebral artery occlusion with cerebral infarction (HCC)     Diabetes mellitus (HCC)     Hyperlipidemia     Hypertension     :   History reviewed. No pertinent surgical history.  Medications:  Scheduled Meds:   sodium chloride flush  5-40 mL IntraVENous 2 times per day    aspirin  81 mg Oral Daily    Or    aspirin  300 mg Rectal Daily    atorvastatin  80 mg Oral Nightly    apixaban  2.5 mg Oral BID    carboxymethylcellulose  1 drop Both Eyes TID    Vitamin D  5,000 Units Oral Daily    divalproex  125 mg Oral Daily    divalproex  500 mg Oral Nightly    docusate sodium  100 mg Oral Daily    b complex vitamins  1 capsule Oral Daily with breakfast    pantoprazole  40 mg Oral QAM AC     polyethylene glycol  17 g Oral Daily    potassium bicarb-citric acid  20 mEq Oral Daily    furosemide  40 mg IntraVENous Daily    insulin lispro  0-8 Units SubCUTAneous TID WC    insulin lispro  0-4 Units SubCUTAneous Nightly    cefTRIAXone (ROCEPHIN) IV  1,000 mg IntraVENous Q24H    azithromycin  500 mg IntraVENous Q24H     Continuous Infusions:   sodium chloride      dextrose       PRN Meds:.sodium chloride flush, sodium chloride, polyethylene glycol, melatonin, glucose, dextrose bolus **OR** dextrose bolus, glucagon (rDNA), dextrose, prochlorperazine **OR** prochlorperazine    No Known Allergies  Social History     Socioeconomic History    Marital status:      Spouse name: Not on file    Number of children: Not on file    Years of education: Not on file    Highest education level: Not on file   Occupational History    Not on file   Tobacco Use    Smoking status: Former     Types: Cigarettes    Smokeless tobacco: Former   Substance and Sexual Activity    Alcohol use: Not Currently    Drug use: Never    Sexual activity: Not Currently   Other Topics Concern    Not on file   Social History Narrative    Not on file     Social Determinants of Health     Financial Resource Strain: Not on file   Food Insecurity: Not on file   Transportation Needs: Not on file   Physical Activity: Not on file   Stress: Not on file   Social Connections: Not on file   Intimate Partner Violence: Not on file   Housing Stability: Not on file      History reviewed. No pertinent family history.      ROS (10 systems)  In addition to that documented in the HPI above, the additional ROS was obtained:  Constitutional: Denies fevers or chills  Eyes: Denies vision changes  ENMT: Denies sore throat  CV: Denies chest pain  Resp: Denies SOB  GI: Denies vomiting or diarrhea  : Denies painful urination  MSK: Denies recent trauma  Skin: Denies new rashes  Neuro: Denies new numbness or tingling or weakness  Endocrine: Denies unexpected weight  wakefulness during the day time hours with lights on, blinds open, out of bed. Reorient as needed. Minimize disruption to sleep during night time hours as able.   Management of underlying infection per IM  Cardiology following  Will continue to follow pending completion of neurodiagnostics, clinical course.       Thank you for allowing us to participate in the care of your patient.  If there are any questions regarding evaluation please feel free to contact us.     Haley Thomson, APRN - CNP, 5/8/2024     Attending Addendum:    I have discussed this patient with the mid-level provider.  I have personally seen and examined the patient and reviewed the diagnostic testing and any changes in the history or physical exam are documented above.  I agree with the plan of care as documented.  I have evaluated/treated an acute on chronic illness/injury that poses threat to life or bodily function and/or Chronic illness with severe exacerbation, or treatment of side effect in this encounter. I have performed a substantive portion of this shared visit, with more than 50% of the time spent in face-to-face and in direct patient care, including obtaining critical elements of the history, performing a physical exam, reviewing laboratory and radiological data, medical decision-making, coordinating patient care, discussing the plan of care with the patient, and documentation.  The above was discussed and reviewed with the THAD.     Patient seen and evaluated at bedside. His daughter was present for my examination.  She reports noticing progressive memory loss over the past couple years but that there was an abrupt change as of recent.  Do anticipate that his abrupt change in mentation is likely secondary to an acute encephalopathy in the setting of a pneumonia, SVT and JOSH.  Nonetheless, he did have a CTA head and neck completed in the emergency department does demonstrate a questionable small thrombus in the proximal left ICA and

## 2024-05-08 NOTE — ED PROVIDER NOTES
Emergency Department Encounter    Patient: Eliazar Ugarte  MRN: [id number]  : 1940  Date of Evaluation: 2024  ED Provider:  Karyna Adams DO    Triage Chief Complaint:   Altered Mental Status, Fall, and Extremity Weakness    [place]:  Eliazar Ugarte is a 83 y.o. male with history of hyperlipidemia hypertension CVA atrial fibrillation diabetes dementia anxiety that presents to the emergency department from home via EMS for altered mental status fall and weakness.  Per EMS they arrived patient's home and patient has some left facial droop they state patient last known well was 5 PM right before he fell down.  They state patient had some somewhat weak  strength when they arrived to his home and they said he was tachycardic elevated heart rate they state it was SVT so they gave 6 mg of adenosine and route as well as 12 mg of adenosine and route.  Upon arrival to the emergency department patient with some left facial droop.  Patient was able to follow commands.  Patient was made a stroke alert and sent to the CT scanner.    ROS - see HPI, below listed is current ROS at time of my eval:  Review of system obtained from EMS due to patient with altered mental status or not able to give history of present illness, history of dementia    Past Medical History:   Diagnosis Date    Atrial fibrillation (HCC)     Cerebral artery occlusion with cerebral infarction (HCC)     Diabetes mellitus (HCC)     Hyperlipidemia     Hypertension      History reviewed. No pertinent surgical history.  History reviewed. No pertinent family history.  Social History     Socioeconomic History    Marital status:      Spouse name: Not on file    Number of children: Not on file    Years of education: Not on file    Highest education level: Not on file   Occupational History    Not on file   Tobacco Use    Smoking status: Former     Types: Cigarettes    Smokeless tobacco: Former   Substance and Sexual Activity    Alcohol use: Not

## 2024-05-08 NOTE — ED NOTES
ED TO INPATIENT SBAR HANDOFF    Patient Name: Eliazar Ugarte   :  1940  83 y.o.   Preferred Name  Eliazar  Family/Caregiver Present no   Restraints no   C-SSRS:    Sitter no   Sepsis Risk Score Sepsis Risk Score: 3.47      Situation  Chief Complaint   Patient presents with    Altered Mental Status    Fall    Extremity Weakness     Brief Description of Patient's Condition: Eliazar Ugarte is a 83 y.o. male with history of hyperlipidemia hypertension CVA atrial fibrillation diabetes dementia anxiety that presents to the emergency department from home via EMS for altered mental status fall and weakness.  Per EMS they arrived patient's home and patient has some left facial droop they state patient last known well was 5 PM right before he fell down.  They state patient had some somewhat weak  strength when they arrived to his home and they said he was tachycardic elevated heart rate they state it was SVT so they gave 6 mg of adenosine and route as well as 12 mg of adenosine and route.  Upon arrival to the emergency department patient with some left facial droop.  Patient was able to follow commands.  Patient was made a stroke alert and sent to the CT scanner. Patient was also a sepsis alert, cultures are done.   Mental Status: oriented, alert, and coherent  Arrived from: nursing home    Imaging:   CT CERVICAL SPINE WO CONTRAST   Final Result      No fracture seen.         Electronically signed by Shine Ignacio MD      CTA HEAD NECK W CONTRAST   Final Result      Atherosclerotic changes seen in the region of the carotid bifurcation bilaterally, left greater the right with approximately 40% diameter stenosis and small focal adherent thrombus seen within the lumen of the proximal left internal carotid artery.      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      Dominant left vertebral artery gives rise to the basilar artery and shows atherosclerotic calcifications both distally and at its origin.              MCH 32.3 (*)     MCHC 31.1 (*)     Platelets 123 (*)     Neutrophils % 79.5 (*)     Lymphocytes % 7.6 (*)     Monocytes % 11.7 (*)     Immature Neutrophil % 0.6 (*)     All other components within normal limits   COMPREHENSIVE METABOLIC PANEL - Abnormal; Notable for the following components:    Sodium 131 (*)     Chloride 96 (*)     Glucose 204 (*)     Creatinine 1.5 (*)     Est, Glom Filt Rate 46 (*)     Calcium 8.2 (*)     Total Protein 6.3 (*)     Albumin 3.2 (*)     All other components within normal limits   PROTIME-INR - Abnormal; Notable for the following components:    Protime 17.1 (*)     All other components within normal limits   TROPONIN - Abnormal; Notable for the following components:    Troponin, High Sensitivity 93 (*)     All other components within normal limits   URINALYSIS WITH REFLEX TO CULTURE - Abnormal; Notable for the following components:    Clarity, UA CLOUDY (*)     Ketones, Urine TRACE (*)     Blood, Urine LARGE NUMBER OR AMOUNT OBSERVED (*)     Protein,  (*)     All other components within normal limits   BLOOD GAS, VENOUS - Abnormal; Notable for the following components:    pO2, Darren 61 (*)     O2 Sat, Darren 88.1 (*)     All other components within normal limits   BRAIN NATRIURETIC PEPTIDE - Abnormal; Notable for the following components:    Pro-BNP 6,805 (*)     All other components within normal limits   URINE MICROSCOPIC WITH REFLEX TO CULTURE - Abnormal; Notable for the following components:    RBC,  (*)     WBC, UA 4 (*)     Bacteria, UA RARE (*)     Mucus, UA RARE (*)     All other components within normal limits   POCT GLUCOSE - Abnormal; Notable for the following components:    POC Glucose 194 (*)     All other components within normal limits       Background  History:   Past Medical History:   Diagnosis Date    Atrial fibrillation (HCC)     Cerebral artery occlusion with cerebral infarction (HCC)     Diabetes mellitus (HCC)     Hyperlipidemia     Hypertension

## 2024-05-08 NOTE — H&P
History and Physical      Name:  Eliazar Ugarte /Age/Sex: 1940  (83 y.o. male)   MRN & CSN:  [id number] & [id number] Encounter Date/Time: 2024 12:46 AM EDT   Location:  ED31/ED-31 PCP: Roberta Schafer       [facility] Day: 2    Assessment and Plan:     #.  Strokelike symptoms  -Stroke alert called in ED  -Last well-known-5 PM  -NIHSS could not be done as patient is currently sleeping, not able to be kept awake.  -CT head-no acute intracranial abnormality  -CTA head and neck-atherosclerotic changes noted in the region of the carotid bifurcation bilaterally.  Left greater than the right with approximately 40% diameter stenosis and small focal adherent thrombus seen within the lumen of the proximal left internal carotid artery.  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  -Patient was not deemed a TNK candidate as he is on Eliquis.  -NIHSS/PT/OT/SLP evaluation ordered  -MRI brain ordered  -Continue aspirin, statin  -Consult neurology.    #.  SVT  -As per documentation, enroute to the hospital  -Received 6 mg, 12 mg of adenosine  -EKG on cnhtsgn-G-mov with RVR-.  -Consult cardiology.    #.  Abnormal troponin  -Troponin 93, 106  -EKG-no acute ST-T wave changes.  -Serial troponins, repeat EKG  -Consult cardiology.    #.  JOSH on CKD  -Creatinine 1.1-2024, 1.5 today  -Diurese and monitor    #.  Pneumonia  -Chest x-ray-right upper lobe suprahilar airspace disease   -Blood cultures drawn in ED  -Procalcitonin 0.199  -Continue ceftriaxone, azithromycin.    #.  Fall  -CT head, CT C-spine-no acute process  -PT/OT evaluation when appropriate.    #.  Coronary artery disease s/p CABG as per documentation from nursing home    #.  BEBETO    #.  Congestive heart failure with acute exacerbation  -2D echo ordered  -Patient is on Lasix 20 mg daily  -Continue Lasix 40 mg daily and monitor    #.  Diabetes mellitus type 2  -Patient is on glipizide 5 mg.  -Continue insulin sliding scale with hypoglycemia  CERVICAL SPINE WO CONTRAST    Result Date: 5/7/2024  EXAM: CT CERVICAL SPINE WO CONTRAST INDICATION: Fall COMPARISON: None Technical factors: Non-contrast CT imaging was performed of the cervical spine. Axial and multiplanar reformatted images reviewed.   Individualized dose optimization technique was used in order to meet ALARA standards for radiation dose reduction.  In addition to vendor specific dose reduction algorithms, the dose reduction techniques vary based on the specific scanner utilized but frequently include automated exposure control, adjustment of the mA and/or kV according to patient size, and use of iterative reconstruction technique. Contrast: None FINDINGS: No evidence of acute fracture or traumatic abnormality is seen. Multilevel spondylotic changes seen with uncovertebral spurring at multiple levels noted bilaterally throughout the cervical spine. If concern discogenic disease, MRI correlation would be recommended.     No fracture seen. Electronically signed by Shine Ignacio MD    CT HEAD WO CONTRAST    Result Date: 5/7/2024  CT HEAD WITHOUT CONTRAST HISTORY: Stroke COMPARISON: 1/27/2023 FINDINGS: Age-related atrophy is seen with periventricular low-attenuation seen diffusely consistent with chronic ischemic myelopathy. There is no evidence of intracranial hemorrhage or abnormal extra-axial fluid collection. There are no definite signs to suggest acute infarction. However, if there is concern of early infarction or other subtle intracranial abnormality, MRI correlation should be considered. Because of thickening is seen in the bilateral ethmoid air cells.     No evidence of acute intracranial abnormality. Electronically signed by Shine Ignacio MD      Personally reviewed Lab Studies, Imaging.    Electronically signed by Gabe Call MD on 5/8/2024 at 12:46 AM    Comment: Please note this report has been produced using speech recognition software and may contain errors related to

## 2024-05-08 NOTE — PROGRESS NOTES
4 Eyes Skin Assessment     NAME:  Eliazar Ugarte  YOB: 1940  MEDICAL RECORD NUMBER:  [id number]    The patient is being assessed for  Admission    I agree that at least one RN has performed a thorough Head to Toe Skin Assessment on the patient. ALL assessment sites listed below have been assessed.      Areas assessed by both nurses:    Head, Face, Ears, Shoulders, Back, Chest, Arms, Elbows, Hands, Sacrum. Buttock, Coccyx, Ischium, and Legs. Feet and Heels        Does the Patient have a Wound? Yes wound(s) were present on assessment. LDA wound assessment was Initiated and completed by RN       Garry Prevention initiated by RN: Yes  Wound Care Orders initiated by RN: Yes    Pressure Injury (Stage 3,4, Unstageable, DTI, NWPT, and Complex wounds) if present, place Wound referral order by RN under : No    New Ostomies, if present place, Ostomy referral order under : No     Nurse 1 eSignature: Electronically signed by Monse Arevalo RN on 5/8/24 at 2:28 AM EDT    **SHARE this note so that the co-signing nurse can place an eSignature**    Nurse 2 eSignature: Electronically signed by Mona Gupta RN on 5/8/24 at 4:23 AM EDT

## 2024-05-08 NOTE — PROGRESS NOTES
Unable to complete admission questions.  Patient is confused.  He knows who he is and where he is at times.

## 2024-05-08 NOTE — CONSULTS
INPATIENT CARDIOLOGY CONSULT NOTE         Reason for consultation:   Atrial fibrillation    History of present illness:Eliazar is a 83 y.o.year old who is admitted with   Chief Complaint   Patient presents with    Altered Mental Status    Fall    Extremity Weakness        Patient is a 83-year-old gentleman who presents to the hospital from nursing home with altered mental status.    Patient has prior medical history significant for coronary disease s/p CABG x 3 LIMA LAD SVG-OM SVG-RCA, history of essential hypertension hyperlipidemia paroxysmal atrial fibrillation diabetes mellitus history of prior stroke sleep apnea chronic kidney failure dementia.    Patient unable to provide any meaningful history due to possible stroke/dementia/encephalopathy.    Data reviewed from medical records.  Cardiology consulted to evaluate patient for atrial fibrillation, possible SVT requiring adenosine administration by EMS, and elevated troponin      Pertinent Lab Personally Review     Recent Labs     05/08/24 0318   WBC 11.0*   HGB 13.5   HCT 41.2*   *      Recent Labs     05/08/24 0318      K 4.2   CL 99   CO2 25   BUN 18   CREATININE 1.5*     Recent Labs     05/07/24 1949   AST 30   ALT 25   BILITOT 0.8   ALKPHOS 99     Lab Results   Component Value Date    PROBNP 6,805 (H) 05/07/2024         Past medical history:    has a past medical history of Atrial fibrillation (HCC), Cerebral artery occlusion with cerebral infarction (HCC), Diabetes mellitus (HCC), Hyperlipidemia, and Hypertension.  Past surgical history:   has no past surgical history on file.  Social History:   reports that he has quit smoking. His smoking use included cigarettes. He has quit using smokeless tobacco. He reports that he does not currently use alcohol. He reports that he does not use drugs.  Family history:   no family history of CAD, STROKE of DM    No Known Allergies    sodium chloride flush 0.9 % injection 5-40 mL, 2 times per        Medical Decision Making :       Atrial fibrillation with rapid ventricular response  Paroxysmal atrial fibrillation on anticoagulation at home  Possible SVT s/p adenosine given by EMS, rhythm strips not available  Possible acute stroke   Underlying mild dementia, nursing home resident  History of coronary disease s/p CABG  Essential hypertension  Hyperlipidemia  History of ischemic cardiomyopathy with recovered LV ejection fraction in the past      At this time we will attempt rate control strategy.  Currently rate controlled with heart rate of around 85 bpm  Start patient on metoprolol tartrate 25 twice daily  Eliquis is currently on hold, neurology/neuro surgery evaluation pending.  Continue with aspirin  Continue with statins  Continue with IV Lasix for now  Request EP evaluation           Prior documentations in EMR were personally reviewed at length  EKGs, labs, imaging were personally reviewed and interpreted  Case discussed with   Nursing Staff  Family members  Thank you for the consult       Dr. ZI Murry  5/8/2024 1:13 PM

## 2024-05-08 NOTE — CONSULTS
[facility] Nurse  Consult Note       Eliazar Ugarte  AGE: 83 y.o.   GENDER: male  : 1940  TODAY'S DATE:  2024    Subjective:     Reason for  Evaluation and Assessment: skin assessment      Eliazar Ugarte is a 83 y.o. male referred by:   [] Physician  [] Nursing  [] Other:     Wound Identification:  Wound Type: skin tear  Contributing Factors: none        PAST MEDICAL HISTORY        Diagnosis Date    Atrial fibrillation (HCC)     Cerebral artery occlusion with cerebral infarction (HCC)     Diabetes mellitus (HCC)     Hyperlipidemia     Hypertension        PAST SURGICAL HISTORY    History reviewed. No pertinent surgical history.    FAMILY HISTORY    History reviewed. No pertinent family history.    SOCIAL HISTORY    Social History     Tobacco Use    Smoking status: Former     Types: Cigarettes    Smokeless tobacco: Former   Substance Use Topics    Alcohol use: Not Currently    Drug use: Never       ALLERGIES    No Known Allergies    MEDICATIONS    No current facility-administered medications on file prior to encounter.     Current Outpatient Medications on File Prior to Encounter   Medication Sig Dispense Refill    glipiZIDE (GLUCOTROL) 5 MG tablet Take 1 tablet by mouth 2 times daily (before meals)      furosemide (LASIX) 20 MG tablet Take 1 tablet by mouth daily      potassium chloride (KLOR-CON) 20 MEQ packet Take 20 mEq by mouth daily      carboxymethylcellulose 1 % ophthalmic solution Place 1 drop into both eyes 3 times daily      Cholecalciferol (VITAMIN D3) 125 MCG (5000 UT) TABS Take by mouth      divalproex (DEPAKOTE) 125 MG DR tablet Take 1 tablet by mouth daily      divalproex (DEPAKOTE) 500 MG DR tablet Take 1 tablet by mouth at bedtime 90 tablet 3    melatonin 3 MG TABS tablet Take 2 tablets by mouth nightly as needed (for sleep) 30 tablet 0    Probiotic Product (ACIDOPHILUS PEARLS PO) Take 1 capsule by mouth daily      atorvastatin (LIPITOR) 40 MG tablet Take 40 mg by mouth  nightly      docusate sodium (COLACE) 100 MG capsule Take 100 mg by mouth daily      apixaban (ELIQUIS) 5 MG TABS tablet Take 5 mg by mouth 2 times daily      Multiple Vitamins-Minerals (EYE MULTIVITAMIN/SODIUM PO) Take 1 tablet by mouth 2 times daily      Omega-3 Fatty Acids (FISH OIL) 1000 MG capsule Take 1,000 mg by mouth daily      omeprazole (PRILOSEC) 20 MG delayed release capsule Take 20 mg by mouth daily      Calcium Carb-Cholecalciferol (OYSTER SHELL CALCIUM + D PO) Take 1 tablet by mouth daily Receives 500/200 mg tablets      tamsulosin (FLOMAX) 0.4 MG capsule Take 0.4 mg by mouth nightly      b complex vitamins capsule Take 1 capsule by mouth daily      nitroGLYCERIN (NITROSTAT) 0.4 MG SL tablet Place 0.4 mg under the tongue every 5 minutes as needed for Chest pain up to max of 3 total doses. If no relief after 1 dose, call 911.      polyethylene glycol (GLYCOLAX) 17 g packet Take 1 packet by mouth daily           Objective:      /72   Pulse 83   Temp 98.1 °F (36.7 °C) (Oral)   Resp 18   Ht 1.867 m (6' 1.5\")   Wt 102.4 kg (225 lb 12 oz)   SpO2 93%   BMI 29.38 kg/m²   Garry Risk Score: Garry Scale Score: 16    LABS    CBC:   Lab Results   Component Value Date/Time    WBC 11.0 05/08/2024 03:18 AM    RBC 4.22 05/08/2024 03:18 AM    HGB 13.5 05/08/2024 03:18 AM    HCT 41.2 05/08/2024 03:18 AM    MCV 97.6 05/08/2024 03:18 AM    MCH 32.0 05/08/2024 03:18 AM    MCHC 32.8 05/08/2024 03:18 AM    RDW 15.1 05/08/2024 03:18 AM     05/08/2024 03:18 AM    MPV 10.3 05/08/2024 03:18 AM     CMP:    Lab Results   Component Value Date/Time     05/08/2024 03:18 AM    K 4.2 05/08/2024 03:18 AM    CL 99 05/08/2024 03:18 AM    CO2 25 05/08/2024 03:18 AM    BUN 18 05/08/2024 03:18 AM    CREATININE 1.5 05/08/2024 03:18 AM    LABGLOM 46 05/08/2024 03:18 AM    LABGLOM >60 02/06/2024 05:40 AM    GLUCOSE 153 05/08/2024 03:18 AM    CALCIUM 8.2 05/08/2024 03:18 AM    BILITOT 0.8 05/07/2024 07:49 PM

## 2024-05-08 NOTE — PROGRESS NOTES
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      Impressions: Eliazar Ugarte was seen for a bedside swallowing evaluation after being admitted to [facility] with stroke-like symptoms.  Pt was alert and confused throughout assessment.  Relevant medical hx includes dementia, CKD and pneumonia.  Pt reports that he is unable to drink thin liquids from a straw at baseline.  For today's assessment pt was positioned upright in bed and presented with a clear vocal quality and strong volitional cough.  He was unable to follow commands to complete oral mechanism examination at this time.  PO trials of puree, soft solids and thin liquids by cup sips were given.  Mild-moderate oral dysphagia was observed characterized by prolonged mastication and slow oral A-P transit.  Pharyngeal swallow appeared intermittently delayed with adequate laryngeal elevation.  Clear vocal quality and 0 overt s/s of aspiration were observed with all PO trials given.    Recommend downgrade to soft and bite sized solids/thin liquids by cup sips with strict aspiration precautions.  SLP will continue to follow.        ONSET DATE: this admission     Date of Eval: 2024  Evaluating Therapist: LLOYD Espino    Current Diet level:  Current Diet : Regular  Current Liquid Diet : Thin    Primary Complaint  weakness    Pain:  Pain Assessment  Pain Assessment: None - Denies Pain  Pain Level: 0  Patient's Stated Pain Goal: 0 - No pain    Reason for Referral  Eliazar Ugarte was referred for a bedside

## 2024-05-08 NOTE — CARE COORDINATION
05/08/24 1548   Service Assessment   Patient Orientation Unable to Assess   Cognition Dementia / Early Alzheimer's   History Provided By Child/Family   Primary Caregiver Other (Comment)  ([facility] assisted living)   Support Systems Children   Patient's Healthcare Decision Maker is: Legal Next of Kin   PCP Verified by CM Yes   Prior Functional Level Assistance with the following:;Mobility   Current Functional Level Assistance with the following:;Mobility   Can patient return to prior living arrangement Yes   Ability to make needs known: Poor   Family able to assist with home care needs: No   Would you like for me to discuss the discharge plan with any other family members/significant others, and if so, who? Yes  (daughter Luana)   Financial Resources Medicare   Community Resources Assisted Living     CM in to see Pt to initiate discharge planning.  Pt with dementia.  Pt daughter Luana present.     Pt is from [facility] assisted living.  Discharge plan is to return.  Luana denies any needs at this time.     CM call to [facility].  Pt can return when medically ready.  If SNF is needed, Pt will need precert.     CM following

## 2024-05-08 NOTE — PROGRESS NOTES
Patient admitted earlier this morning by my colleague agree with her assessment and plan as documented.  Patient to be evaluated by neurological services as well as cardiology.    Patrick Sinha MD  Hospitalist

## 2024-05-09 ENCOUNTER — APPOINTMENT (OUTPATIENT)
Dept: INTERVENTIONAL RADIOLOGY/VASCULAR | Age: 84
DRG: 884 | End: 2024-05-09
Payer: MEDICARE

## 2024-05-09 LAB
ALBUMIN SERPL-MCNC: 3.5 GM/DL (ref 3.4–5)
ALP BLD-CCNC: 92 IU/L (ref 40–128)
ALT SERPL-CCNC: 28 U/L (ref 10–40)
ANION GAP SERPL CALCULATED.3IONS-SCNC: 12 MMOL/L (ref 7–16)
AST SERPL-CCNC: 40 IU/L (ref 15–37)
BASOPHILS ABSOLUTE: 0 K/CU MM
BASOPHILS RELATIVE PERCENT: 0.6 % (ref 0–1)
BILIRUB SERPL-MCNC: 0.7 MG/DL (ref 0–1)
BUN SERPL-MCNC: 21 MG/DL (ref 6–23)
CALCIUM SERPL-MCNC: 8.2 MG/DL (ref 8.3–10.6)
CHLORIDE BLD-SCNC: 99 MMOL/L (ref 99–110)
CO2: 28 MMOL/L (ref 21–32)
CREAT SERPL-MCNC: 1.2 MG/DL (ref 0.9–1.3)
DIFFERENTIAL TYPE: ABNORMAL
EOSINOPHILS ABSOLUTE: 0.1 K/CU MM
EOSINOPHILS RELATIVE PERCENT: 0.9 % (ref 0–3)
GFR, ESTIMATED: 60 ML/MIN/1.73M2
GLUCOSE BLD-MCNC: 129 MG/DL (ref 70–99)
GLUCOSE BLD-MCNC: 153 MG/DL (ref 70–99)
GLUCOSE BLD-MCNC: 159 MG/DL (ref 70–99)
GLUCOSE BLD-MCNC: 161 MG/DL (ref 70–99)
GLUCOSE SERPL-MCNC: 187 MG/DL (ref 70–99)
HCT VFR BLD CALC: 42.9 % (ref 42–52)
HEMOGLOBIN: 13.8 GM/DL (ref 13.5–18)
IMMATURE NEUTROPHIL %: 0.4 % (ref 0–0.43)
LYMPHOCYTES ABSOLUTE: 1.1 K/CU MM
LYMPHOCYTES RELATIVE PERCENT: 20.8 % (ref 24–44)
MCH RBC QN AUTO: 31.5 PG (ref 27–31)
MCHC RBC AUTO-ENTMCNC: 32.2 % (ref 32–36)
MCV RBC AUTO: 97.9 FL (ref 78–100)
MONOCYTES ABSOLUTE: 0.4 K/CU MM
MONOCYTES RELATIVE PERCENT: 7.1 % (ref 0–4)
NEUTROPHILS ABSOLUTE: 3.7 K/CU MM
NEUTROPHILS RELATIVE PERCENT: 70.2 % (ref 36–66)
NUCLEATED RBC %: 0 %
PDW BLD-RTO: 14.7 % (ref 11.7–14.9)
PLATELET # BLD: 146 K/CU MM (ref 140–440)
PMV BLD AUTO: 10.1 FL (ref 7.5–11.1)
POTASSIUM SERPL-SCNC: 3.9 MMOL/L (ref 3.5–5.1)
RBC # BLD: 4.38 M/CU MM (ref 4.6–6.2)
SODIUM BLD-SCNC: 139 MMOL/L (ref 135–145)
TOTAL IMMATURE NEUTOROPHIL: 0.02 K/CU MM
TOTAL NUCLEATED RBC: 0 K/CU MM
TOTAL PROTEIN: 6.3 GM/DL (ref 6.4–8.2)
TROPONIN, HIGH SENSITIVITY: 144 NG/L (ref 0–22)
WBC # BLD: 5.3 K/CU MM (ref 4–10.5)

## 2024-05-09 PROCEDURE — 99233 SBSQ HOSP IP/OBS HIGH 50: CPT | Performed by: INTERNAL MEDICINE

## 2024-05-09 PROCEDURE — 6360000004 HC RX CONTRAST MEDICATION

## 2024-05-09 PROCEDURE — 85025 COMPLETE CBC W/AUTO DIFF WBC: CPT

## 2024-05-09 PROCEDURE — 2700000000 HC OXYGEN THERAPY PER DAY

## 2024-05-09 PROCEDURE — 2580000003 HC RX 258: Performed by: NURSE PRACTITIONER

## 2024-05-09 PROCEDURE — 97535 SELF CARE MNGMENT TRAINING: CPT

## 2024-05-09 PROCEDURE — 82962 GLUCOSE BLOOD TEST: CPT

## 2024-05-09 PROCEDURE — B31F1ZZ FLUOROSCOPY OF LEFT VERTEBRAL ARTERY USING LOW OSMOLAR CONTRAST: ICD-10-PCS | Performed by: PSYCHIATRY & NEUROLOGY

## 2024-05-09 PROCEDURE — 99152 MOD SED SAME PHYS/QHP 5/>YRS: CPT

## 2024-05-09 PROCEDURE — 6370000000 HC RX 637 (ALT 250 FOR IP): Performed by: INTERNAL MEDICINE

## 2024-05-09 PROCEDURE — 6360000002 HC RX W HCPCS: Performed by: PSYCHIATRY & NEUROLOGY

## 2024-05-09 PROCEDURE — 36227 PLACE CATH XTRNL CAROTID: CPT

## 2024-05-09 PROCEDURE — 6370000000 HC RX 637 (ALT 250 FOR IP): Performed by: STUDENT IN AN ORGANIZED HEALTH CARE EDUCATION/TRAINING PROGRAM

## 2024-05-09 PROCEDURE — B31C1ZZ FLUOROSCOPY OF BILATERAL EXTERNAL CAROTID ARTERIES USING LOW OSMOLAR CONTRAST: ICD-10-PCS | Performed by: PSYCHIATRY & NEUROLOGY

## 2024-05-09 PROCEDURE — 2500000003 HC RX 250 WO HCPCS: Performed by: STUDENT IN AN ORGANIZED HEALTH CARE EDUCATION/TRAINING PROGRAM

## 2024-05-09 PROCEDURE — 6360000002 HC RX W HCPCS: Performed by: STUDENT IN AN ORGANIZED HEALTH CARE EDUCATION/TRAINING PROGRAM

## 2024-05-09 PROCEDURE — 36226 PLACE CATH VERTEBRAL ART: CPT

## 2024-05-09 PROCEDURE — 2500000003 HC RX 250 WO HCPCS: Performed by: PSYCHIATRY & NEUROLOGY

## 2024-05-09 PROCEDURE — 6360000002 HC RX W HCPCS

## 2024-05-09 PROCEDURE — 97166 OT EVAL MOD COMPLEX 45 MIN: CPT

## 2024-05-09 PROCEDURE — 36415 COLL VENOUS BLD VENIPUNCTURE: CPT

## 2024-05-09 PROCEDURE — 93005 ELECTROCARDIOGRAM TRACING: CPT | Performed by: STUDENT IN AN ORGANIZED HEALTH CARE EDUCATION/TRAINING PROGRAM

## 2024-05-09 PROCEDURE — 84484 ASSAY OF TROPONIN QUANT: CPT

## 2024-05-09 PROCEDURE — 2140000000 HC CCU INTERMEDIATE R&B

## 2024-05-09 PROCEDURE — 2580000003 HC RX 258: Performed by: STUDENT IN AN ORGANIZED HEALTH CARE EDUCATION/TRAINING PROGRAM

## 2024-05-09 PROCEDURE — 2580000003 HC RX 258: Performed by: INTERNAL MEDICINE

## 2024-05-09 PROCEDURE — 99233 SBSQ HOSP IP/OBS HIGH 50: CPT | Performed by: NURSE PRACTITIONER

## 2024-05-09 PROCEDURE — B3181ZZ FLUOROSCOPY OF BILATERAL INTERNAL CAROTID ARTERIES USING LOW OSMOLAR CONTRAST: ICD-10-PCS | Performed by: PSYCHIATRY & NEUROLOGY

## 2024-05-09 PROCEDURE — 36226 PLACE CATH VERTEBRAL ART: CPT | Performed by: PSYCHIATRY & NEUROLOGY

## 2024-05-09 PROCEDURE — 94761 N-INVAS EAR/PLS OXIMETRY MLT: CPT

## 2024-05-09 PROCEDURE — 80053 COMPREHEN METABOLIC PANEL: CPT

## 2024-05-09 PROCEDURE — B3151ZZ FLUOROSCOPY OF BILATERAL COMMON CAROTID ARTERIES USING LOW OSMOLAR CONTRAST: ICD-10-PCS | Performed by: PSYCHIATRY & NEUROLOGY

## 2024-05-09 PROCEDURE — 36223 PLACE CATH CAROTID/INOM ART: CPT

## 2024-05-09 PROCEDURE — 99232 SBSQ HOSP IP/OBS MODERATE 35: CPT | Performed by: NURSE PRACTITIONER

## 2024-05-09 PROCEDURE — 36224 PLACE CATH CAROTD ART: CPT

## 2024-05-09 PROCEDURE — 36223 PLACE CATH CAROTID/INOM ART: CPT | Performed by: PSYCHIATRY & NEUROLOGY

## 2024-05-09 PROCEDURE — 99153 MOD SED SAME PHYS/QHP EA: CPT

## 2024-05-09 PROCEDURE — C1894 INTRO/SHEATH, NON-LASER: HCPCS

## 2024-05-09 PROCEDURE — 99223 1ST HOSP IP/OBS HIGH 75: CPT | Performed by: INTERNAL MEDICINE

## 2024-05-09 PROCEDURE — 6360000004 HC RX CONTRAST MEDICATION: Performed by: PSYCHIATRY & NEUROLOGY

## 2024-05-09 PROCEDURE — 92526 ORAL FUNCTION THERAPY: CPT

## 2024-05-09 PROCEDURE — B41F1ZZ FLUOROSCOPY OF RIGHT LOWER EXTREMITY ARTERIES USING LOW OSMOLAR CONTRAST: ICD-10-PCS | Performed by: PSYCHIATRY & NEUROLOGY

## 2024-05-09 PROCEDURE — 36224 PLACE CATH CAROTD ART: CPT | Performed by: PSYCHIATRY & NEUROLOGY

## 2024-05-09 PROCEDURE — 36227 PLACE CATH XTRNL CAROTID: CPT | Performed by: PSYCHIATRY & NEUROLOGY

## 2024-05-09 RX ORDER — ONDANSETRON 2 MG/ML
4 INJECTION INTRAMUSCULAR; INTRAVENOUS EVERY 6 HOURS PRN
Status: DISCONTINUED | OUTPATIENT
Start: 2024-05-09 | End: 2024-05-23 | Stop reason: HOSPADM

## 2024-05-09 RX ORDER — MIDAZOLAM HYDROCHLORIDE 2 MG/2ML
INJECTION, SOLUTION INTRAMUSCULAR; INTRAVENOUS PRN
Status: COMPLETED | OUTPATIENT
Start: 2024-05-09 | End: 2024-05-09

## 2024-05-09 RX ORDER — MIDODRINE HYDROCHLORIDE 5 MG/1
5 TABLET ORAL
Status: DISCONTINUED | OUTPATIENT
Start: 2024-05-09 | End: 2024-05-23 | Stop reason: HOSPADM

## 2024-05-09 RX ORDER — LIDOCAINE HYDROCHLORIDE 10 MG/ML
INJECTION, SOLUTION EPIDURAL; INFILTRATION; INTRACAUDAL; PERINEURAL PRN
Status: COMPLETED | OUTPATIENT
Start: 2024-05-09 | End: 2024-05-09

## 2024-05-09 RX ORDER — HALOPERIDOL 5 MG/ML
2 INJECTION INTRAMUSCULAR ONCE
Status: COMPLETED | OUTPATIENT
Start: 2024-05-09 | End: 2024-05-09

## 2024-05-09 RX ORDER — SODIUM CHLORIDE 0.9 % (FLUSH) 0.9 %
5-40 SYRINGE (ML) INJECTION EVERY 12 HOURS SCHEDULED
Status: DISCONTINUED | OUTPATIENT
Start: 2024-05-09 | End: 2024-05-23 | Stop reason: HOSPADM

## 2024-05-09 RX ORDER — IODIXANOL 320 MG/ML
INJECTION, SOLUTION INTRAVASCULAR PRN
Status: COMPLETED | OUTPATIENT
Start: 2024-05-09 | End: 2024-05-09

## 2024-05-09 RX ORDER — FENTANYL CITRATE 50 UG/ML
INJECTION, SOLUTION INTRAMUSCULAR; INTRAVENOUS PRN
Status: COMPLETED | OUTPATIENT
Start: 2024-05-09 | End: 2024-05-09

## 2024-05-09 RX ORDER — ONDANSETRON 4 MG/1
4 TABLET, ORALLY DISINTEGRATING ORAL EVERY 8 HOURS PRN
Status: DISCONTINUED | OUTPATIENT
Start: 2024-05-09 | End: 2024-05-23 | Stop reason: HOSPADM

## 2024-05-09 RX ORDER — ACETAMINOPHEN 325 MG/1
650 TABLET ORAL EVERY 4 HOURS PRN
Status: DISCONTINUED | OUTPATIENT
Start: 2024-05-09 | End: 2024-05-23 | Stop reason: HOSPADM

## 2024-05-09 RX ORDER — SODIUM CHLORIDE 0.9 % (FLUSH) 0.9 %
5-40 SYRINGE (ML) INJECTION PRN
Status: DISCONTINUED | OUTPATIENT
Start: 2024-05-09 | End: 2024-05-23 | Stop reason: HOSPADM

## 2024-05-09 RX ADMIN — ASPIRIN 81 MG: 81 TABLET, CHEWABLE ORAL at 09:20

## 2024-05-09 RX ADMIN — VANCOMYCIN HYDROCHLORIDE 1500 MG: 1.5 INJECTION, POWDER, LYOPHILIZED, FOR SOLUTION INTRAVENOUS at 14:41

## 2024-05-09 RX ADMIN — SODIUM CHLORIDE, PRESERVATIVE FREE 10 ML: 5 INJECTION INTRAVENOUS at 09:22

## 2024-05-09 RX ADMIN — APIXABAN 2.5 MG: 2.5 TABLET, FILM COATED ORAL at 21:47

## 2024-05-09 RX ADMIN — SODIUM CHLORIDE, PRESERVATIVE FREE 10 ML: 5 INJECTION INTRAVENOUS at 22:00

## 2024-05-09 RX ADMIN — IODIXANOL 120 ML: 320 INJECTION, SOLUTION INTRAVASCULAR at 11:03

## 2024-05-09 RX ADMIN — Medication 6 MG: at 21:47

## 2024-05-09 RX ADMIN — LIDOCAINE HYDROCHLORIDE 8 ML: 10 INJECTION, SOLUTION EPIDURAL; INFILTRATION; INTRACAUDAL; PERINEURAL at 10:30

## 2024-05-09 RX ADMIN — CARBOXYMETHYLCELLULOSE SODIUM 1 DROP: 10 GEL OPHTHALMIC at 22:06

## 2024-05-09 RX ADMIN — MICONAZOLE NITRATE: 2 POWDER TOPICAL at 22:11

## 2024-05-09 RX ADMIN — ATORVASTATIN CALCIUM 80 MG: 40 TABLET, FILM COATED ORAL at 21:47

## 2024-05-09 RX ADMIN — SODIUM CHLORIDE 25 ML: 9 INJECTION, SOLUTION INTRAVENOUS at 14:40

## 2024-05-09 RX ADMIN — CEFEPIME 2000 MG: 2 INJECTION, POWDER, FOR SOLUTION INTRAVENOUS at 21:44

## 2024-05-09 RX ADMIN — MUPIROCIN: 20 OINTMENT TOPICAL at 09:21

## 2024-05-09 RX ADMIN — MICONAZOLE NITRATE: 2 POWDER TOPICAL at 09:22

## 2024-05-09 RX ADMIN — HALOPERIDOL LACTATE 2 MG: 5 INJECTION, SOLUTION INTRAMUSCULAR at 01:10

## 2024-05-09 RX ADMIN — METOPROLOL TARTRATE 25 MG: 25 TABLET, FILM COATED ORAL at 21:47

## 2024-05-09 RX ADMIN — SODIUM CHLORIDE, PRESERVATIVE FREE 10 ML: 5 INJECTION INTRAVENOUS at 22:12

## 2024-05-09 RX ADMIN — CARBOXYMETHYLCELLULOSE SODIUM 1 DROP: 10 GEL OPHTHALMIC at 09:20

## 2024-05-09 RX ADMIN — CEFEPIME 2000 MG: 2 INJECTION, POWDER, FOR SOLUTION INTRAVENOUS at 12:57

## 2024-05-09 RX ADMIN — MIDAZOLAM HYDROCHLORIDE 0.5 MG: 1 INJECTION, SOLUTION INTRAMUSCULAR; INTRAVENOUS at 10:27

## 2024-05-09 RX ADMIN — FENTANYL CITRATE 25 MCG: 50 INJECTION, SOLUTION INTRAMUSCULAR; INTRAVENOUS at 10:27

## 2024-05-09 RX ADMIN — MUPIROCIN: 20 OINTMENT TOPICAL at 21:50

## 2024-05-09 RX ADMIN — DIVALPROEX SODIUM 125 MG: 125 CAPSULE, COATED PELLETS ORAL at 09:20

## 2024-05-09 RX ADMIN — SODIUM CHLORIDE 25 ML: 9 INJECTION, SOLUTION INTRAVENOUS at 12:56

## 2024-05-09 RX ADMIN — METOPROLOL TARTRATE 25 MG: 25 TABLET, FILM COATED ORAL at 15:11

## 2024-05-09 ASSESSMENT — PULMONARY FUNCTION TESTS: PIF_VALUE: 0

## 2024-05-09 ASSESSMENT — PAIN SCALES - GENERAL: PAINLEVEL_OUTOF10: 0

## 2024-05-09 NOTE — PROGRESS NOTES
CARDIOLOGY PROGRESS NOTE                                                  Name:  Eliazar Ugarte /Age/Sex: 1940  (83 y.o. male)   MRN & CSN:  [id number] & [id number] Admission Date/Time: 2024  7:19 PM   Location:  3118/3118-A PCP: Roberta Schafer         Admit Date:  2024  Hospital Day: 3      SUBJECTIVE:     Seen patient as follow up as consultation for  AF       TELEMETRY: AF       No intake or output data in the 24 hours ending 24    Assessment/Plan:        Atrial fibrillation with rapid ventricular response.  Acute stroke workup as per neurology  Paroxysmal atrial fibrillation on anticoagulation at home  Possible SVT s/p adenosine given by EMS, rhythm strips not available  Underlying mild dementia, nursing home resident  History of coronary disease s/p CABG  Essential hypertension  Hyperlipidemia  History of ischemic cardiomyopathy with recovered LV ejection fraction in the past     Plans for diagnostic cerebral angiogram noted as per interventional neurology    Rate control strategy for atrial fibrillation  Currently rate controlled with heart rate of around 85 bpm  Start patient on metoprolol tartrate 25 twice daily  Eliquis is currently on hold, neurology/neuro surgery evaluation pending.  Continue with aspirin  Continue with statins  Continue with IV Lasix for now.  Will switch to oral Lasix in morning    Elevated troponin likely in the setting of acute stroke and non-ACS in nature.  Demand ischemia type II MI.  No cardiac ischemic workup is planned       ECHO         Left Ventricle: Low normal left ventricular systolic function with a visually estimated EF of 50 - 55%. Mild posterior thickening. Sigmoid septum causing turbulent flow through the LVOT.    Aortic Valve: Moderate sclerosis of the aortic valve cusp.    Mitral Valve: Mild annular calcification of the mitral valve. Mild regurgitation.    Tricuspid Valve: Trace regurgitation. The estimated RVSP is 28 mmHg.    Left

## 2024-05-09 NOTE — CONSULTS
Electrophysiology Consult Note      Reason for consultation:  ATRIAL FIBRILLATION    Chief complaint : Altered mental status    Referring physician:       Primary care physician: Roberta Schafer      History of Present Illness:     Patient is 83-year-old male with a history of hypertension, hyperlipidemia, diabetes mellitus, CVA, atrial fibrillation history, coronary artery disease with history of CABG, chronic kidney disease, sleep apnea, dementia presented to the hospital with altered mental status.  Patient is unable to give any information to us as he is with altered mentation but is awake but unable to give any history.  Patient is not having meaningful conversation when asked questions.    EP consulted because patient had atrial fibrillation      Pastmedical history:   Past Medical History:   Diagnosis Date    Atrial fibrillation (HCC)     Cerebral artery occlusion with cerebral infarction (HCC)     Diabetes mellitus (HCC)     Hyperlipidemia     Hypertension        Surgical history :  Unable to obtain    Family history:  Unable to obtain    Social history :  reports that he has quit smoking. His smoking use included cigarettes. He has quit using smokeless tobacco. He reports that he does not currently use alcohol. He reports that he does not use drugs.    No Known Allergies    No current facility-administered medications on file prior to encounter.     Current Outpatient Medications on File Prior to Encounter   Medication Sig Dispense Refill    glipiZIDE (GLUCOTROL) 5 MG tablet Take 1 tablet by mouth 2 times daily (before meals)      furosemide (LASIX) 20 MG tablet Take 1 tablet by mouth daily      potassium chloride (KLOR-CON) 20 MEQ packet Take 20 mEq by mouth daily      carboxymethylcellulose 1 % ophthalmic solution Place 1 drop into both eyes 3 times daily      Cholecalciferol (VITAMIN D3) 125 MCG (5000 UT) TABS Take by mouth      divalproex (DEPAKOTE)  eliquis  Eliquis dose should be 5mg bid as his weight is above 60kg and creatinine now less than 1.5 and only criteria he meets for lower dose is 80+years. On presentation he was 1.5 - so may be he has acute kidney injury which is resolved    Rate control strategy at this time.    Atrial Fibrillation CHADSVASC2 Score Stroke Risk:   83 y.o. > 75 - 2    male Male - 0   CHF HX: Yes - 1   HTN HX: Yes - 1   Stroke/TIA/Thromboembolism Yes _2   Vascular Disease HX: Yes - 1   Diabetes Mellitus Yes - 1   CHADSVASC 2 Score 8      Annual Stroke Risk 10.8% - moderate-high         Uptitrate metoprolol as tolerated     He has prolonged QT and not good candidate for antiarrhythmic  Patient if mentation improves (I do not know his baseline and family not in room) then we can consider YUSRA occlusion device but if patient baseline is like this then recommend conservative management    Thanks again for allowing me to participate in care of this patient. Please call me if you have any questions.    With best regards.      Milton Messer MD, 5/9/2024 12:31 PM     Please note this report has been partially produced using speech recognition software and may contain errors related to that system including errors in grammar, punctuation, and spelling, as well as words and phrases that may be inappropriate. If there are any questions or concerns please feel free to contact the dictating provider for clarification.

## 2024-05-09 NOTE — PROGRESS NOTES
V2.0  [facility] [place] Hospitalist Progress Note      Name:  Eliazar Ugarte /Age/Sex: 1940  (83 y.o. male)   MRN & CSN:  [id number] & [id number] Encounter Date/Time: 2024 12:25 PM EDT    Location:  [facility] PCP: Roberta Schafer       [facility] Day: 3    Assessment and Plan:   Eliazar Ugarte is a 83 y.o. male  who presents with Acute encephalopathy      Plan:    #.  Strokelike symptoms  -Stroke alert called in ED  -Last well-known-5 PM  -NIHSS could not be done as patient is currently sleeping, not able to be kept awake.  -CT head-no acute intracranial abnormality  -CTA head and neck-atherosclerotic changes noted in the region of the carotid bifurcation bilaterally.  Left greater than the right with approximately 40% diameter stenosis and small focal adherent thrombus seen within the lumen of the proximal left internal carotid artery.  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  -Patient was not deemed a TNK candidate as he is on Eliquis.  -NIHSS/PT/OT/SLP evaluation ordered  -MRI brain: No acute stroke  -Continue aspirin, statin  -Consult neurology and Neuro interventional  -Angiogram 24     #.  SVT  -As per documentation, enroute to the hospital  -Received 6 mg, 12 mg of adenosine  -EKG on iilsgfm-C-dim with RVR-.  -Consult cardiology.     #.  Abnormal troponin  -Troponin 93, 106  -EKG-no acute ST-T wave changes.  -Serial troponins, repeat EKG  -Consult cardiology.     #.  JOSH on CKD  -Creatinine 1.1-2024, 1.5 today  -Diurese and monitor     #.  Pneumonia  -Chest x-ray-right upper lobe suprahilar airspace disease   -Blood cultures drawn in ED  -Procalcitonin 0.199  -IV antibiotics: Vancomycin plus cefepime     #.  Fall  -CT head, CT C-spine-no acute process  -PT/OT evaluation when appropriate.     #.  Coronary artery disease s/p CABG as per documentation from nursing home     #.  BEBETO     #.  Congestive heart failure with acute exacerbation  -2D echo ordered  -Patient is on Lasix 20 mg  membranes are moist.      Pharynx: Oropharynx is clear.   Eyes:      Extraocular Movements: Extraocular movements intact.      Conjunctiva/sclera: Conjunctivae normal.      Pupils: Pupils are equal, round, and reactive to light.   Cardiovascular:      Rate and Rhythm: Normal rate and regular rhythm.      Pulses: Normal pulses.      Heart sounds: Normal heart sounds.   Pulmonary:      Effort: Pulmonary effort is normal.      Breath sounds: Rhonchi present.   Abdominal:      General: Abdomen is flat. Bowel sounds are normal.      Palpations: Abdomen is soft.   Musculoskeletal:         General: Normal range of motion.      Cervical back: Normal range of motion and neck supple.   Skin:     General: Skin is warm and dry.   Neurological:      General: No focal deficit present.      Mental Status: He is alert and oriented to person, place, and time. Mental status is at baseline.   Psychiatric:         Mood and Affect: Mood normal.         Behavior: Behavior normal.         Thought Content: Thought content normal.         Medications:   Medications:    mupirocin   Topical BID    cefepime  2,000 mg IntraVENous Once    Followed by    cefepime  2,000 mg IntraVENous Q12H    sodium chloride flush  5-40 mL IntraVENous 2 times per day    vancomycin  1,500 mg IntraVENous Q24H    sodium chloride flush  5-40 mL IntraVENous 2 times per day    aspirin  81 mg Oral Daily    Or    aspirin  300 mg Rectal Daily    atorvastatin  80 mg Oral Nightly    apixaban  2.5 mg Oral BID    carboxymethylcellulose  1 drop Both Eyes TID    Vitamin D  5,000 Units Oral Daily    divalproex  125 mg Oral Daily    divalproex  500 mg Oral Nightly    docusate sodium  100 mg Oral Daily    b complex vitamins  1 capsule Oral Daily with breakfast    pantoprazole  40 mg Oral QAM AC    polyethylene glycol  17 g Oral Daily    potassium bicarb-citric acid  20 mEq Oral Daily    furosemide  40 mg IntraVENous Daily    insulin lispro  0-8 Units SubCUTAneous TID WC    insulin  thrombus seen within the proximal left internal carotid artery with approximately 40% diameter stenosis. NECK SOFT TISSUES: No neck soft tissue mass or lymphadenopathy. VISUALIZED MEDIASTINUM: No mass or lymphadenopathy. VISUALIZED LUNG APICES: Clear. BONES: No destructive osseous process. OTHER: None.     Atherosclerotic changes seen in the region of the carotid bifurcation bilaterally, left greater the right with approximately 40% diameter stenosis and small focal adherent thrombus seen within the lumen of the proximal left internal carotid artery. 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 Dominant left vertebral artery gives rise to the basilar artery and shows atherosclerotic calcifications both distally and at its origin. PROCEDURE: CT ANGIOGRAPHY HEAD WITH/WITHOUT CONTRAST INDICATION: ams facial droop COMPARISON: none TECHNIQUE: Axial CT imaging obtained through the head prior to and following administration of IV contrast. Axial images, multiplanar reformatted images, and maximum intensity projection images were reviewed for CT angiographic technique. IV contrast: 75 mL Isovue-370. FINDINGS: ANTERIOR CIRCULATION: The intracranial internal carotid arteries, anterior cerebral arteries, and middle cerebral arteries demonstrate no occlusion or stenosis. No evidence for aneurysm or arteriovenous malformation. POSTERIOR CIRCULATION: The distal left vertebral artery, basilar artery and posterior cerebral arteries demonstrate no occlusion or stenosis. No evidence for aneurysm or arteriovenous malformation. INTRACRANIAL VENOUS SYSTEM: There is no significant enhancement seen of the dural venous sinuses. This may be due to early phase of contrast but thrombus is not excluded. Refer to recent noncontrast CT of the head for additional results.. IMPRESSION: No significant intracranial arterial vascular pathology is identified. There is nonenhancement of the dural saphenous sinuses which may be due to early phase of  contrast opacification but the possibility of thrombosis cannot be excluded on the basis of this exam. Electronically signed by Shine Ignacio MD    CT CERVICAL SPINE WO CONTRAST    Result Date: 5/7/2024  EXAM: CT CERVICAL SPINE WO CONTRAST INDICATION: Fall COMPARISON: None Technical factors: Non-contrast CT imaging was performed of the cervical spine. Axial and multiplanar reformatted images reviewed.   Individualized dose optimization technique was used in order to meet ALARA standards for radiation dose reduction.  In addition to vendor specific dose reduction algorithms, the dose reduction techniques vary based on the specific scanner utilized but frequently include automated exposure control, adjustment of the mA and/or kV according to patient size, and use of iterative reconstruction technique. Contrast: None FINDINGS: No evidence of acute fracture or traumatic abnormality is seen. Multilevel spondylotic changes seen with uncovertebral spurring at multiple levels noted bilaterally throughout the cervical spine. If concern discogenic disease, MRI correlation would be recommended.     No fracture seen. Electronically signed by Shine Ignacio MD    CT HEAD WO CONTRAST    Result Date: 5/7/2024  CT HEAD WITHOUT CONTRAST HISTORY: Stroke COMPARISON: 1/27/2023 FINDINGS: Age-related atrophy is seen with periventricular low-attenuation seen diffusely consistent with chronic ischemic myelopathy. There is no evidence of intracranial hemorrhage or abnormal extra-axial fluid collection. There are no definite signs to suggest acute infarction. However, if there is concern of early infarction or other subtle intracranial abnormality, MRI correlation should be considered. Because of thickening is seen in the bilateral ethmoid air cells.     No evidence of acute intracranial abnormality. Electronically signed by Shine Ignacio MD      Comment: Please note this report has been produced using speech recognition software and

## 2024-05-09 NOTE — PROGRESS NOTES
PHARMACY VANCOMYCIN MONITORING SERVICE  Pharmacy consulted by Dr. Solomon for monitoring and adjustment.    Indication for treatment: Vancomycin indication: CAP with risk factors  Goal trough: Trough Goal: 15-20 mcg/mL  AUC/KEMAL: 400-600    Risk Factors for MRSA Identified:   Documented isolation of MRSA within 1 year     Pertinent Laboratory Values:   Temp Readings from Last 3 Encounters:   05/09/24 98.6 °F (37 °C) (Axillary)   02/02/23 97.5 °F (36.4 °C) (Oral)     Recent Labs     05/07/24 1949 05/08/24  0318   WBC 12.0* 11.0*     Recent Labs     05/07/24 1949 05/08/24  0318   BUN 18 18   CREATININE 1.5* 1.5*     Estimated Creatinine Clearance: 47 mL/min (A) (based on SCr of 1.5 mg/dL (H)).    Intake/Output Summary (Last 24 hours) at 5/9/2024 1205  Last data filed at 5/8/2024 1653  Gross per 24 hour   Intake --   Output 600 ml   Net -600 ml     Last Encounter Weight:  Wt Readings from Last 3 Encounters:   05/09/24 104 kg (229 lb 4.5 oz)   02/01/23 88.8 kg (195 lb 12.8 oz)       Pertinent Cultures:   Date    Source    Results  5/7   Blood    NGTD  5/8   MRSA Nasal   Positive    Vancomycin level:   TROUGH:  No results for input(s): \"VANCOTROUGH\" in the last 72 hours.  RANDOM:  No results for input(s): \"VANCORANDOM\" in the last 72 hours.    Assessment:  HPI: 83 y.o. male with coronary artery disease s/p CABG, hypertension, diabetes mellitus type 2, BEBETO as per documentation, dementia, history of CVA, BPH, congestive heart failure, who was sent from the nursing home for confusion, fall, strokelike symptoms.  Patient is currently in a deep sleep, was hard to wake him up.  Did not follow any commands.  Most of the information was on review of records.   SCr, BUN, and urine output: SCr @ 1.5, BUN @ 18, incomplete I/O data  Day(s) of therapy: 1 of 7  Vancomycin concentration: to be collected     Plan:  Current Vancomycin Dose: 1500 mg Q24H. Level will be scheduled after the 2nd dose.   Predicted  and Trough 16.9

## 2024-05-09 NOTE — CARE COORDINATION
CM call to Pt daughter Luana to follow up on discharge planning.  Luana is not in agreement with OT recommendation.  She would like the Pt to have home care at discharge, stating concern about SNF placement due to Pt's dementia.  Preference of company to be left to [facility] staff.    PS to Dr. Hurley to update, home care order requested    CM call to [facility] who states they will arrange home care at discharge.

## 2024-05-09 NOTE — PROGRESS NOTES
Neurology Service Progress Note  [facility]   Patient Name: Eliazar Ugarte  : 1940        Subjective:   Reason for consult: Altered mental status, fall, extremity weakness and concern for stroke  Chart was reviewed in detail, patient was seen and assessed. He had angiogram earlier today. No evidence of thrombus, no intervention recommended. MRI negative for acute stroke. Discussed results with patients daughter at bedside.     Past Medical History:   Diagnosis Date    Atrial fibrillation (HCC)     Cerebral artery occlusion with cerebral infarction (HCC)     Diabetes mellitus (HCC)     Hyperlipidemia     Hypertension     :   History reviewed. No pertinent surgical history.  Medications:  Scheduled Meds:   mupirocin   Topical BID    cefepime  2,000 mg IntraVENous Q12H    sodium chloride flush  5-40 mL IntraVENous 2 times per day    vancomycin  1,500 mg IntraVENous Q24H    metoprolol tartrate  25 mg Oral BID    midodrine  5 mg Oral TID WC    sodium chloride flush  5-40 mL IntraVENous 2 times per day    aspirin  81 mg Oral Daily    Or    aspirin  300 mg Rectal Daily    atorvastatin  80 mg Oral Nightly    apixaban  2.5 mg Oral BID    carboxymethylcellulose  1 drop Both Eyes TID    Vitamin D  5,000 Units Oral Daily    divalproex  125 mg Oral Daily    divalproex  500 mg Oral Nightly    docusate sodium  100 mg Oral Daily    b complex vitamins  1 capsule Oral Daily with breakfast    pantoprazole  40 mg Oral QAM AC    polyethylene glycol  17 g Oral Daily    potassium bicarb-citric acid  20 mEq Oral Daily    furosemide  40 mg IntraVENous Daily    insulin lispro  0-8 Units SubCUTAneous TID WC    insulin lispro  0-4 Units SubCUTAneous Nightly    miconazole   Topical BID     Continuous Infusions:   dilTIAZem      sodium chloride 25 mL (24 1440)    dextrose       PRN Meds:.sodium chloride flush, acetaminophen, ondansetron **OR** ondansetron, sodium chloride flush, sodium chloride, polyethylene  NAD, cooperative  HEENT: NC/AT, EOMI, PERRL, mmm,  neck supple, no meningeal signs  Heart: a fib   Lungs: respirations even and unlabored  Ext: no edema, no calf tenderness b/l  Psych: normal mood and affect  Skin: discoloration of left lower extremity, likely PVD, scattered scabbed areas on right lower extremities    NEUROLOGIC EXAM:    Mental Status: A&O to self, confused to location, month and year, NAD, speech clear, language fluent, repetition and naming intact, follows commands appropriately    Cranial Nerve Exam:   CN II-XII:  PERRL, VFF, no nystagmus, no gaze paresis, sensation V1-V3 intact b/l, muscles of facial expression symmetric; hearing impaired  to conversational tone, palate elevates symmetrically, shoulder elevation symmetric and tongue protrudes midline with movement side to side.    Motor Exam:       Strength 5/5 UE's/LE's b/l  Tone and bulk normal   No pronator drift    Deep Tendon Reflexes:1/4 biceps, triceps, brachioradialis, patellar, and achilles b/l; flexor plantar responses b/l    Sensation: Intact light touch/pinprick/vibration UE's/LE's b/l    Coordination/Cerebellum:       Tremors--none      Rapidly alternating movements: no dysdiadochokinesia b/l                Heel-to-Shin: no dysmetria b/l      Finger-to-Nose: no dysmetria b/l    Gait and stance:      Gait: deferred      LABS:     Recent Labs     05/07/24  1949 05/08/24  0318 05/09/24  1415   WBC 12.0* 11.0* 5.3   * 136 139   K 4.2 4.2 3.9   CL 96* 99 99   CO2 21 25 28   BUN 18 18 21   CREATININE 1.5* 1.5* 1.2   GLUCOSE 204* 153* 187*   ALBUMIN 3.2*  --  3.5   INR 1.4  --   --            IMAGING:    CT head w/o contrast:  IMPRESSION:     No evidence of acute intracranial abnormality.    CTA head and neck:  IMPRESSION:     Atherosclerotic changes seen in the region of the carotid bifurcation bilaterally, left greater the right with approximately 40% diameter stenosis and small focal adherent thrombus seen within the lumen of the

## 2024-05-09 NOTE — OR NURSING
PROCEDURE START:1030  PROCEDURE END: 1105  FLUORO TIME: 11.9   AK: 1502  DAP: 483  CONTRAST VOLUME:120 cc Visipaque    ARRHYTHMIA:N?A  INTERVENTION:

## 2024-05-09 NOTE — PROGRESS NOTES
1248 RN notified of pt HR tachy up to 160s hospitalist and cardio notified, see orders.  1750 Pt sundowning increasing confusion pulling at tele [facility] iv ect, trying to get out of chair and unlocking chair to move around, charge rn notified sitter requested.

## 2024-05-09 NOTE — PRE SEDATION
Sedation Pre-Procedure Note    Patient Name: Eliazar Ugarte   YOB: 1940  Room/Bed: 3026/3026-A  Medical Record Number: [id number]  Date: 5/9/2024   Time: 9:35 AM       Indication:  Diagnostic cerebral angigoram    Consent: I have discussed with the patient and/or the patient representative the indication, alternatives, and the possible risks and/or complications of the planned procedure and the anesthesia methods. The patient and/or patient representative appear to understand and agree to proceed.    Vital Signs:   Vitals:    05/09/24 0220   BP: (!) 125/95   Pulse: 79   Resp: 16   Temp: (!) 96.5 °F (35.8 °C)   SpO2: 91%       Past Medical History:   has a past medical history of Atrial fibrillation (HCC), Cerebral artery occlusion with cerebral infarction (HCC), Diabetes mellitus (HCC), Hyperlipidemia, and Hypertension.    Past Surgical History:   has no past surgical history on file.    Medications:   Scheduled Meds:    mupirocin   Topical BID    cefepime  2,000 mg IntraVENous Once    Followed by    cefepime  2,000 mg IntraVENous Q12H    sodium chloride flush  5-40 mL IntraVENous 2 times per day    aspirin  81 mg Oral Daily    Or    aspirin  300 mg Rectal Daily    atorvastatin  80 mg Oral Nightly    [Held by provider] apixaban  2.5 mg Oral BID    carboxymethylcellulose  1 drop Both Eyes TID    Vitamin D  5,000 Units Oral Daily    divalproex  125 mg Oral Daily    divalproex  500 mg Oral Nightly    docusate sodium  100 mg Oral Daily    b complex vitamins  1 capsule Oral Daily with breakfast    pantoprazole  40 mg Oral QAM AC    polyethylene glycol  17 g Oral Daily    potassium bicarb-citric acid  20 mEq Oral Daily    furosemide  40 mg IntraVENous Daily    insulin lispro  0-8 Units SubCUTAneous TID WC    insulin lispro  0-4 Units SubCUTAneous Nightly    miconazole   Topical BID     Continuous Infusions:    sodium chloride      dextrose       PRN Meds: sodium chloride flush, sodium chloride, polyethylene   Gary Hollingsworth MD   tamsulosin (FLOMAX) 0.4 MG capsule Take 0.4 mg by mouth nightly    Gary Hollingsworth MD   b complex vitamins capsule Take 1 capsule by mouth daily    Gary Hollingsworth MD   nitroGLYCERIN (NITROSTAT) 0.4 MG SL tablet Place 0.4 mg under the tongue every 5 minutes as needed for Chest pain up to max of 3 total doses. If no relief after 1 dose, call 911.    Gary Hollingsworth MD   polyethylene glycol (GLYCOLAX) 17 g packet Take 1 packet by mouth daily    Gary Hollingsworth MD     Coumadin Use Last 7 Days:  no  Antiplatelet drug therapy use last 7 days: No  Other anticoagulant use last 7 days: yes - Eliquis  Additional Medication Information:  See above      Pre-Sedation Documentation and Exam:   I have personally completed a history, physical exam & review of systems for this patient (see notes).    Mallampati Airway Assessment:  Mallampati Class II - (soft palate, fauces & uvula are visible)    Prior History of Anesthesia Complications:   none    ASA Classification:  Class 2 - A normal healthy patient with mild systemic disease    Sedation/ Anesthesia Plan:   intravenous sedation    Medications Planned:   fentanyl and versed intravenously    Patient is an appropriate candidate for plan of sedation: yes    Electronically signed by KALI Talbert CNP on 5/9/2024 at 9:35 AM

## 2024-05-09 NOTE — PROGRESS NOTES
Vascular/Interventional Neurology Progress Note  [facility]  Patient Name: Eliazar Ugarte     : 1940      Subjective:     The patient was seen and examined.  No acute events overnight.  Nursing reports that the patient was very restless and combative overnight.  This morning he is sleeping but arouses easily to verbal prompting.  He is oriented to name only.  MRI completed yesterday was negative for acute infarct.          Objective:   Scheduled Meds:   mupirocin   Topical BID    cefepime  2,000 mg IntraVENous Once    Followed by    cefepime  2,000 mg IntraVENous Q12H    sodium chloride flush  5-40 mL IntraVENous 2 times per day    aspirin  81 mg Oral Daily    Or    aspirin  300 mg Rectal Daily    atorvastatin  80 mg Oral Nightly    [Held by provider] apixaban  2.5 mg Oral BID    carboxymethylcellulose  1 drop Both Eyes TID    Vitamin D  5,000 Units Oral Daily    divalproex  125 mg Oral Daily    divalproex  500 mg Oral Nightly    docusate sodium  100 mg Oral Daily    b complex vitamins  1 capsule Oral Daily with breakfast    pantoprazole  40 mg Oral QAM AC    polyethylene glycol  17 g Oral Daily    potassium bicarb-citric acid  20 mEq Oral Daily    furosemide  40 mg IntraVENous Daily    insulin lispro  0-8 Units SubCUTAneous TID WC    insulin lispro  0-4 Units SubCUTAneous Nightly    miconazole   Topical BID     Continuous Infusions:   sodium chloride      dextrose       PRN Meds:.sodium chloride flush, sodium chloride, polyethylene glycol, melatonin, glucose, dextrose bolus **OR** dextrose bolus, glucagon (rDNA), dextrose, prochlorperazine **OR** prochlorperazine    Vital Signs:  Vitals:    24 0930   BP: 118/87   Pulse: 87   Resp: 18   Temp: 96.9 °F (36.1 °C)   SpO2: 99%        General: A&O x 1, NAD, cooperative  HEENT: NC/AT, EOMI, PERRL, mmm, neck supple  Extremities: no edema, no calf tenderness b/l    Neurological Exam:         Mental Status:  A&O to self, NAD, speech clear,  evidence for acute or subacute ischemic process the brain  2. Moderate periventricular chronic lupus encephalopathy  3. Scattered less than 1 cm high signal foci identified in the long TE sequences that may related to areas of ischemic demyelination versus remote infarcts     Electronically signed by MD Marquis Ovalles           Specimen Collected: 05/08/24 16:01 EDT Last Resulted: 05/08/24 16:03 EDT             Assessment/Plan:    83-year-old male presenting with altered mental status, fall, and SVT.  CTA with atherosclerotic changes of b/l ICA with a possible thrombus in the left ICA.  Non-enhancement of dural venous sinuses, thrombus cannot be excluded.      -Neuroimaging as above.  -MRI of the brain negative for acute/subacute ischemia.  Moderate periventricular chronic lupus encephalopathy.  Scattered less than 1 cm high signal foci identified in the long TE sequences that may related to areas of ischemic demyelination versus remote infarcts  -Pertinent images discussed/reviewed with Dr. Magdaleno who has fully participated in the care of this patient and agrees with plan.   -Plan for diagnostic cerebral angiogram tomorrow for evaluation of possible thrombus of the left ICA and dural venous sinuses today.    -Continue Eliquis, Asa, and Lipitor for secondary stroke prevention  -PT/OT as needed           Electronically signed by KALI Talbert - CNP on 5/9/2024 at 10:03 AM   5/9/2024

## 2024-05-09 NOTE — PROGRESS NOTES
Occupational Therapy  [facility] ACUTE CARE OCCUPATIONAL THERAPY EVALUATION  Eliazar Ugarte, 1940, 3026/3026-A, 5/9/2024    Discharge Recommendation: Encourage facility for moderate post-acute rehabilitation, anticipate 1-2 hours per day and 5 days per week.    History  [place]:  The primary encounter diagnosis was Altered mental status, unspecified altered mental status type. Diagnoses of Fall, initial encounter, Acute congestive heart failure, unspecified heart failure type (HCC), Acute kidney injury (HCC), Hyponatremia, Atrial fibrillation, unspecified type (HCC), Closed head injury, initial encounter, and Longstanding persistent atrial fibrillation (HCC) were also pertinent to this visit.  Patient  has a past medical history of Atrial fibrillation (HCC), Cerebral artery occlusion with cerebral infarction (HCC), Diabetes mellitus (HCC), Hyperlipidemia, and Hypertension.  Patient  has no past surgical history on file.    Subjective:  Patient comments: \"We're at the tail end of 1989 I imagine\".    Pain:  Denied.    Communication with other providers: Nurse okayed session.  Restrictions: General Precautions, Fall Risk, Contact (MRSA),      Home Setup/Prior level of function  Social/Functional History  Lives With: Alone  Type of Home: Assisted living (at [facility])  Home Layout: One level  Home Equipment: Rollator  ADL Assistance: Independent  Homemaking Assistance: Needs assistance  Homemaking Responsibilities: Yes  Ambulation Assistance: Independent  Transfer Assistance: Independent    Pt is confused and a poor historian. Social Functional information should be verified with family or a caregiver     Examination of body systems (includes body structures/functions, activity/participation limitations):  Observation:  Semi-fowlers in bed upon arrival, agreeable to therapy  Vision: WFL  Hearing:  [place]  Cardiopulmonary:  On 3L O2, tele, vitals remained stable throughout session    Body Systems and  functions:  ROM R/L: Approx 120 degrees shoulder flexion, distally WFL     Strength R/L:  BUE 4/5,   4+/5  Sensation: WFL  Tone: Normal  Coordination: WFL  Perception: WFL    Cognitive and Psychosocial Functioning:  Overall cognitive status:  Pt is A&Ox2 (self, location), attention appears intact, and is able to follow multi-step commands w/o difficulty. Pt has impaired insight, decreased safety awareness, can be impulsive, and requries increased time for processing. Pt has baseline dementia.  Affect: Pleasantly confused        Activities of Daily Living (ADLs):  Feeding: Independent (pt able to bring hands to/from mouth to manager sucker during session)   Grooming: SBA (pt washed face w/ wet cloth seated EOB)   UB bathing: SBA   LB bathing: CGA   UB dressing: SBA   LB dressing: CGA (pt demo'ed ability to reach to feet to adjust socks while seated at EOB)   Toileting: CGA (pt managed clothing to attempt to use urinal while in stand)     Pt ADL function inferred from observation of gross motor function, and assessment of mobility, balance, posture, safety awareness, cognition, and activity tolerance.     AM-PAC 6 click short form for inpatient daily activity:   How much help from another person does the patient currently need... Unable  Dep A Lot  Max A A Lot   Mod A A Little  Min A A Little   CGA  SBA None   Mod I  Indep  Sup   1.  Putting on and taking off regular lower body clothing? [] 1    [] 2   [] 2   [] 3   [x] 3   [] 4      2. Bathing (including washing, rinsing, drying)? [] 1   [] 2   [] 2 [] 3 [x] 3 [] 4   3. Toileting, which includes using toilet, bedpan, or urinal? [] 1    [] 2   [] 2   [] 3   [x] 3   [] 4     4. Putting on and taking off regular upper body clothing? [] 1   [] 2   [] 2   [] 3   [x] 3    [] 4      5. Taking care of personal grooming such as brushing teeth? [] 1   [] 2    [] 2 [] 3    [x] 3   [] 4      6. Eating meals?   [] 1   [] 2   [] 2   [] 3   [] 3   [x] 4      Raw Score:  19

## 2024-05-09 NOTE — PROGRESS NOTES
[facility]  DEPARTMENT OF SPEECH/LANGUAGE PATHOLOGY  DAILY PROGRESS NOTE  Eliazar Ugarte  5/9/2024  [id number]  Hyponatremia [E87.1]  Acute kidney injury (HCC) [N17.9]  Acute encephalopathy [G93.40]  Closed head injury, initial encounter [S09.90XA]  Fall, initial encounter [W19.XXXA]  Altered mental status, unspecified altered mental status type [R41.82]  Atrial fibrillation, unspecified type (HCC) [I48.91]  Acute congestive heart failure, unspecified heart failure type (HCC) [I50.9]  No Known Allergies      Pt was seen this date for dysphagia treatment.       IMPRESSION AND RECOMMENDATIONS:   Eliazar Ugarte was seen for a bedside swallowing treatment and diet tolerance monitoring.  Pt was alert and cooperative throughout assessment.  Pt was seen with lunch tray, accepting PO trials of thin liquids via straw sips and regular solids. Oral stage was mild-moderately impaired with adequate labial seal, prolonged mastication, slow AP transit and mild lingual residue. Pharyngeal stage appears grossly WFL with no s/s of aspiration across consistencies.     Recommend soft and bite-sized diet d/t prolonged mastication with suspected fatigue throughout progression of meal. Of note, pt's daughter previously requested pt's diet remain regular solids/thin liquids, therefore, pt will remain on regular solids with thin liquids by small cup sips with strict aspiration precautions.  SLP will continue to follow.     GOALS (current status in bold):  Short-term Goals  Timeframe for Short-term Goals: LOS or until goals are met  Goal 1: Pt will tolerate regular solids/thin liquids with adequate oral manipulation and no s/s of aspiration 100%   Ongoing, pt cont to demonstrate prolonged mastication of regular solids. Continue monitoring tolerance  Goal 2: Pt/caregivers will demonstrate comprehension of recommendations/POC Goal being met, continue       EDUCATION: recommendations/POC    PAIN RATING (0-10 Scale):

## 2024-05-09 NOTE — PROGRESS NOTES
TRANSFER - OUT REPORT:    Verbal report given to Ranulfo RN(name) on Eliazar Ugarte being transferred to [facility](unit) for routine post-op       Report consisted of patient's Situation, Background, Assessment and   Recommendations(SBAR).     Information from the following report(s) Nurse Handoff Report was reviewed with the receiving nurse.    Opportunity for questions and clarification was provided.      Patient transported with:   Registered Nurse

## 2024-05-10 LAB
ANION GAP SERPL CALCULATED.3IONS-SCNC: 9 MMOL/L (ref 7–16)
BASOPHILS ABSOLUTE: 0 K/CU MM
BASOPHILS RELATIVE PERCENT: 0.4 % (ref 0–1)
BUN SERPL-MCNC: 21 MG/DL (ref 6–23)
CALCIUM SERPL-MCNC: 7.9 MG/DL (ref 8.3–10.6)
CHLORIDE BLD-SCNC: 106 MMOL/L (ref 99–110)
CO2: 24 MMOL/L (ref 21–32)
CREAT SERPL-MCNC: 1.2 MG/DL (ref 0.9–1.3)
DIFFERENTIAL TYPE: ABNORMAL
EKG ATRIAL RATE: 73 BPM
EKG DIAGNOSIS: NORMAL
EKG P AXIS: 81 DEGREES
EKG P-R INTERVAL: 196 MS
EKG Q-T INTERVAL: 480 MS
EKG QRS DURATION: 160 MS
EKG QTC CALCULATION (BAZETT): 536 MS
EKG R AXIS: -56 DEGREES
EKG T AXIS: -22 DEGREES
EKG VENTRICULAR RATE: 75 BPM
EOSINOPHILS ABSOLUTE: 0.1 K/CU MM
EOSINOPHILS RELATIVE PERCENT: 1.3 % (ref 0–3)
GFR, ESTIMATED: 60 ML/MIN/1.73M2
GLUCOSE BLD-MCNC: 146 MG/DL (ref 70–99)
GLUCOSE BLD-MCNC: 187 MG/DL (ref 70–99)
GLUCOSE BLD-MCNC: 190 MG/DL (ref 70–99)
GLUCOSE SERPL-MCNC: 133 MG/DL (ref 70–99)
HCT VFR BLD CALC: 40.6 % (ref 42–52)
HEMOGLOBIN: 13.2 GM/DL (ref 13.5–18)
IMMATURE NEUTROPHIL %: 0.4 % (ref 0–0.43)
LYMPHOCYTES ABSOLUTE: 1.6 K/CU MM
LYMPHOCYTES RELATIVE PERCENT: 19.4 % (ref 24–44)
MCH RBC QN AUTO: 31.7 PG (ref 27–31)
MCHC RBC AUTO-ENTMCNC: 32.5 % (ref 32–36)
MCV RBC AUTO: 97.4 FL (ref 78–100)
MONOCYTES ABSOLUTE: 0.7 K/CU MM
MONOCYTES RELATIVE PERCENT: 8.5 % (ref 0–4)
NEUTROPHILS ABSOLUTE: 5.8 K/CU MM
NEUTROPHILS RELATIVE PERCENT: 70 % (ref 36–66)
NUCLEATED RBC %: 0 %
PDW BLD-RTO: 14.6 % (ref 11.7–14.9)
PLATELET # BLD: 158 K/CU MM (ref 140–440)
PMV BLD AUTO: 10.5 FL (ref 7.5–11.1)
POTASSIUM SERPL-SCNC: 4.4 MMOL/L (ref 3.5–5.1)
RBC # BLD: 4.17 M/CU MM (ref 4.6–6.2)
SODIUM BLD-SCNC: 139 MMOL/L (ref 135–145)
TOTAL IMMATURE NEUTOROPHIL: 0.03 K/CU MM
TOTAL NUCLEATED RBC: 0 K/CU MM
WBC # BLD: 8.3 K/CU MM (ref 4–10.5)

## 2024-05-10 PROCEDURE — 6370000000 HC RX 637 (ALT 250 FOR IP): Performed by: NURSE PRACTITIONER

## 2024-05-10 PROCEDURE — 85025 COMPLETE CBC W/AUTO DIFF WBC: CPT

## 2024-05-10 PROCEDURE — C1751 CATH, INF, PER/CENT/MIDLINE: HCPCS

## 2024-05-10 PROCEDURE — 97530 THERAPEUTIC ACTIVITIES: CPT

## 2024-05-10 PROCEDURE — 2580000003 HC RX 258: Performed by: STUDENT IN AN ORGANIZED HEALTH CARE EDUCATION/TRAINING PROGRAM

## 2024-05-10 PROCEDURE — 80048 BASIC METABOLIC PNL TOTAL CA: CPT

## 2024-05-10 PROCEDURE — 93010 ELECTROCARDIOGRAM REPORT: CPT | Performed by: INTERNAL MEDICINE

## 2024-05-10 PROCEDURE — 05HD33Z INSERTION OF INFUSION DEVICE INTO RIGHT CEPHALIC VEIN, PERCUTANEOUS APPROACH: ICD-10-PCS | Performed by: PSYCHIATRY & NEUROLOGY

## 2024-05-10 PROCEDURE — 51701 INSERT BLADDER CATHETER: CPT

## 2024-05-10 PROCEDURE — 2580000003 HC RX 258: Performed by: NURSE PRACTITIONER

## 2024-05-10 PROCEDURE — 2580000003 HC RX 258: Performed by: INTERNAL MEDICINE

## 2024-05-10 PROCEDURE — 2500000003 HC RX 250 WO HCPCS: Performed by: NURSE PRACTITIONER

## 2024-05-10 PROCEDURE — 6370000000 HC RX 637 (ALT 250 FOR IP): Performed by: INTERNAL MEDICINE

## 2024-05-10 PROCEDURE — 6360000002 HC RX W HCPCS: Performed by: STUDENT IN AN ORGANIZED HEALTH CARE EDUCATION/TRAINING PROGRAM

## 2024-05-10 PROCEDURE — 82962 GLUCOSE BLOOD TEST: CPT

## 2024-05-10 PROCEDURE — 2140000000 HC CCU INTERMEDIATE R&B

## 2024-05-10 PROCEDURE — 6360000002 HC RX W HCPCS: Performed by: INTERNAL MEDICINE

## 2024-05-10 PROCEDURE — 97162 PT EVAL MOD COMPLEX 30 MIN: CPT

## 2024-05-10 PROCEDURE — 76937 US GUIDE VASCULAR ACCESS: CPT

## 2024-05-10 PROCEDURE — 94761 N-INVAS EAR/PLS OXIMETRY MLT: CPT

## 2024-05-10 PROCEDURE — 51798 US URINE CAPACITY MEASURE: CPT

## 2024-05-10 PROCEDURE — 6370000000 HC RX 637 (ALT 250 FOR IP): Performed by: STUDENT IN AN ORGANIZED HEALTH CARE EDUCATION/TRAINING PROGRAM

## 2024-05-10 PROCEDURE — 99232 SBSQ HOSP IP/OBS MODERATE 35: CPT | Performed by: NURSE PRACTITIONER

## 2024-05-10 PROCEDURE — 36410 VNPNXR 3YR/> PHY/QHP DX/THER: CPT

## 2024-05-10 PROCEDURE — 2709999900 HC NON-CHARGEABLE SUPPLY

## 2024-05-10 PROCEDURE — 84484 ASSAY OF TROPONIN QUANT: CPT

## 2024-05-10 PROCEDURE — 6360000002 HC RX W HCPCS: Performed by: NURSE PRACTITIONER

## 2024-05-10 PROCEDURE — 99233 SBSQ HOSP IP/OBS HIGH 50: CPT | Performed by: INTERNAL MEDICINE

## 2024-05-10 RX ORDER — FUROSEMIDE 20 MG/1
20 TABLET ORAL DAILY
Status: DISCONTINUED | OUTPATIENT
Start: 2024-05-11 | End: 2024-05-23 | Stop reason: HOSPADM

## 2024-05-10 RX ORDER — LORAZEPAM 2 MG/ML
1 INJECTION INTRAMUSCULAR ONCE
Status: COMPLETED | OUTPATIENT
Start: 2024-05-10 | End: 2024-05-10

## 2024-05-10 RX ADMIN — CEFEPIME 2000 MG: 2 INJECTION, POWDER, FOR SOLUTION INTRAVENOUS at 20:23

## 2024-05-10 RX ADMIN — MICONAZOLE NITRATE: 2 POWDER TOPICAL at 09:38

## 2024-05-10 RX ADMIN — SODIUM CHLORIDE, PRESERVATIVE FREE 10 ML: 5 INJECTION INTRAVENOUS at 09:39

## 2024-05-10 RX ADMIN — FUROSEMIDE 40 MG: 10 INJECTION, SOLUTION INTRAMUSCULAR; INTRAVENOUS at 10:47

## 2024-05-10 RX ADMIN — VANCOMYCIN HYDROCHLORIDE 1500 MG: 1.5 INJECTION, POWDER, LYOPHILIZED, FOR SOLUTION INTRAVENOUS at 13:20

## 2024-05-10 RX ADMIN — SODIUM CHLORIDE, PRESERVATIVE FREE 10 ML: 5 INJECTION INTRAVENOUS at 09:38

## 2024-05-10 RX ADMIN — POLYETHYLENE GLYCOL (3350) 17 G: 17 POWDER, FOR SOLUTION ORAL at 09:17

## 2024-05-10 RX ADMIN — CARBOXYMETHYLCELLULOSE SODIUM 1 DROP: 10 GEL OPHTHALMIC at 09:37

## 2024-05-10 RX ADMIN — PANTOPRAZOLE SODIUM 40 MG: 40 TABLET, DELAYED RELEASE ORAL at 06:57

## 2024-05-10 RX ADMIN — DOCUSATE SODIUM 100 MG: 100 CAPSULE, LIQUID FILLED ORAL at 09:15

## 2024-05-10 RX ADMIN — VITAMIN B COMPLEX 1 CAPSULE: at 09:17

## 2024-05-10 RX ADMIN — MICONAZOLE NITRATE: 2 POWDER TOPICAL at 20:22

## 2024-05-10 RX ADMIN — METOPROLOL TARTRATE 25 MG: 25 TABLET, FILM COATED ORAL at 20:26

## 2024-05-10 RX ADMIN — SODIUM CHLORIDE 2.5 MG/HR: 900 INJECTION, SOLUTION INTRAVENOUS at 01:26

## 2024-05-10 RX ADMIN — ASPIRIN 81 MG: 81 TABLET, CHEWABLE ORAL at 09:16

## 2024-05-10 RX ADMIN — ATORVASTATIN CALCIUM 80 MG: 40 TABLET, FILM COATED ORAL at 20:25

## 2024-05-10 RX ADMIN — SODIUM CHLORIDE, PRESERVATIVE FREE 10 ML: 5 INJECTION INTRAVENOUS at 20:41

## 2024-05-10 RX ADMIN — MIDODRINE HYDROCHLORIDE 5 MG: 5 TABLET ORAL at 17:13

## 2024-05-10 RX ADMIN — MIDODRINE HYDROCHLORIDE 5 MG: 5 TABLET ORAL at 09:16

## 2024-05-10 RX ADMIN — MUPIROCIN: 20 OINTMENT TOPICAL at 20:40

## 2024-05-10 RX ADMIN — DIVALPROEX SODIUM 125 MG: 125 CAPSULE, COATED PELLETS ORAL at 09:16

## 2024-05-10 RX ADMIN — SODIUM CHLORIDE, PRESERVATIVE FREE 10 ML: 5 INJECTION INTRAVENOUS at 20:39

## 2024-05-10 RX ADMIN — APIXABAN 5 MG: 5 TABLET, FILM COATED ORAL at 20:25

## 2024-05-10 RX ADMIN — Medication 5000 UNITS: at 09:39

## 2024-05-10 RX ADMIN — LORAZEPAM 1 MG: 2 INJECTION INTRAMUSCULAR; INTRAVENOUS at 20:34

## 2024-05-10 RX ADMIN — METOPROLOL TARTRATE 25 MG: 25 TABLET, FILM COATED ORAL at 09:15

## 2024-05-10 RX ADMIN — CEFEPIME 2000 MG: 2 INJECTION, POWDER, FOR SOLUTION INTRAVENOUS at 09:25

## 2024-05-10 RX ADMIN — POTASSIUM BICARBONATE 20 MEQ: 782 TABLET, EFFERVESCENT ORAL at 09:16

## 2024-05-10 RX ADMIN — MUPIROCIN: 20 OINTMENT TOPICAL at 09:38

## 2024-05-10 RX ADMIN — CARBOXYMETHYLCELLULOSE SODIUM 1 DROP: 10 GEL OPHTHALMIC at 14:06

## 2024-05-10 RX ADMIN — CARBOXYMETHYLCELLULOSE SODIUM 1 DROP: 10 GEL OPHTHALMIC at 20:39

## 2024-05-10 RX ADMIN — APIXABAN 2.5 MG: 2.5 TABLET, FILM COATED ORAL at 09:16

## 2024-05-10 RX ADMIN — DIVALPROEX SODIUM 500 MG: 125 CAPSULE, COATED PELLETS ORAL at 20:35

## 2024-05-10 RX ADMIN — Medication 6 MG: at 20:25

## 2024-05-10 ASSESSMENT — PAIN SCALES - GENERAL
PAINLEVEL_OUTOF10: 0
PAINLEVEL_OUTOF10: 0

## 2024-05-10 ASSESSMENT — PULMONARY FUNCTION TESTS: PIF_VALUE: 0

## 2024-05-10 NOTE — PLAN OF CARE
Problem: Discharge Planning  Goal: Discharge to home or other facility with appropriate resources  Outcome: Progressing  Flowsheets (Taken 5/10/2024 0232)  Discharge to home or other facility with appropriate resources:   Identify barriers to discharge with patient and caregiver   Arrange for needed discharge resources and transportation as appropriate     Problem: Safety - Adult  Goal: Free from fall injury  Outcome: Progressing  Flowsheets (Taken 5/10/2024 0232)  Free From Fall Injury: Instruct family/caregiver on patient safety     Problem: Pain  Goal: Verbalizes/displays adequate comfort level or baseline comfort level  Outcome: Progressing     Problem: ABCDS Injury Assessment  Goal: Absence of physical injury  Outcome: Progressing  Flowsheets (Taken 5/10/2024 0232)  Absence of Physical Injury: Implement safety measures based on patient assessment     Problem: Chronic Conditions and Co-morbidities  Goal: Patient's chronic conditions and co-morbidity symptoms are monitored and maintained or improved  Outcome: Progressing

## 2024-05-10 NOTE — PROGRESS NOTES
V2.0  [facility]      Name:  Eliazar Ugarte /Age/Sex: 1940  (83 y.o. male)   MRN & CSN:  [id number] & [id number] Encounter Date/Time: 5/10/2024 12:25 PM EDT    Location:  [address] [place] [address]-A PCP: Roberta Schafer       [facility] Day: 4    Assessment and Plan:   Eliazar Ugarte is a 83 y.o. male  who presents with Acute encephalopathy      Plan:        #.  Pneumonia  -Chest x-ray-right upper lobe suprahilar airspace disease   -Blood cultures drawn in ED  -Procalcitonin 0.199  -IV antibiotics: Vancomycin plus cefepime    #.  Strokelike symptoms  -Stroke alert called in ED  -Last well-known-5 PM  -NIHSS could not be done as patient is currently sleeping, not able to be kept awake.  -CT head-no acute intracranial abnormality  -CTA head and neck-atherosclerotic changes noted in the region of the carotid bifurcation bilaterally.  Left greater than the right with approximately 40% diameter stenosis and small focal adherent thrombus seen within the lumen of the proximal left internal carotid artery.  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  -Patient was not deemed a TNK candidate as he is on Eliquis.  -NIHSS/PT/OT/SLP evaluation ordered  -MRI brain: No acute stroke  -Continue aspirin, statin  -Consult neurology and Neuro interventional  -Angiogram 24     #.  SVT  -As per documentation, enroute to the hospital  -Received 6 mg, 12 mg of adenosine  -EKG on [id number] with RVR-.  -Consult cardiology.  -Continune Beta blocker     #.  Abnormal troponin  -Troponin 93, 106  -EKG-no acute ST-T wave changes.  -Serial troponins, repeat EKG  -Consult cardiology.     #.  JOSH on CKD  -Creatinine 1.1-2024, 1.5 today  -Diurese and monitor      #.  Fall  -CT head, CT C-spine-no acute process  -PT/OT evaluation when appropriate.     #.  Coronary artery disease s/p CABG as per documentation from nursing home     #.  BEBETO     #.  Congestive heart failure with acute exacerbation  -2D echo

## 2024-05-10 NOTE — PROGRESS NOTES
PHARMACY VANCOMYCIN MONITORING SERVICE  Pharmacy consulted by Dr. Solomon for monitoring and adjustment.    Indication for treatment: Vancomycin indication: CAP with risk factors  Goal trough: Trough Goal: 15-20 mcg/mL  AUC/KEMAL: 400-600    Risk Factors for MRSA Identified:   Documented isolation of MRSA within 1 year     Pertinent Laboratory Values:   Temp Readings from Last 3 Encounters:   05/10/24 97.5 °F (36.4 °C) (Oral)   02/02/23 97.5 °F (36.4 °C) (Oral)     Recent Labs     05/08/24  0318 05/09/24  1415 05/10/24  0111   WBC 11.0* 5.3 8.3       Recent Labs     05/08/24  0318 05/09/24  1415 05/10/24  0111   BUN 18 21 21   CREATININE 1.5* 1.2 1.2       Estimated Creatinine Clearance: 59 mL/min (based on SCr of 1.2 mg/dL).    Intake/Output Summary (Last 24 hours) at 5/10/2024 1325  Last data filed at 5/10/2024 0619  Gross per 24 hour   Intake 73.81 ml   Output 960 ml   Net -886.19 ml       Last Encounter Weight:  Wt Readings from Last 3 Encounters:   05/10/24 102.1 kg (225 lb 1.4 oz)   02/01/23 88.8 kg (195 lb 12.8 oz)       Pertinent Cultures:   Date    Source    Results  5/7   Blood    NGTD  5/8   MRSA Nasal   Positive    Vancomycin level:   TROUGH:  No results for input(s): \"VANCOTROUGH\" in the last 72 hours.  RANDOM:  No results for input(s): \"VANCORANDOM\" in the last 72 hours.    Assessment:  HPI: 83 y.o. male with coronary artery disease s/p CABG, hypertension, diabetes mellitus type 2, BEBETO as per documentation, dementia, history of CVA, BPH, congestive heart failure, who was sent from the nursing home for confusion, fall, strokelike symptoms.  Patient is currently in a deep sleep, was hard to wake him up.  Did not follow any commands.  Most of the information was on review of records.   SCr, BUN, and urine output:   Scr 1.2 mg/dL today, stable from yesterday  BUN WNL  0.4 mL/kg/hr of urine output in last 24 hours  Day(s) of therapy: 2 of 7  Vancomycin concentration: to be collected     Plan:  Patient

## 2024-05-10 NOTE — CONSULTS
PHYSICAL THERAPY EVALUATION  [facility] ACUTE CARE   Eliazar Ugarte, 1940, 3107/3107-A, 5/10/2024    Assessment:  Pt is a pleasant, 83 yom admitted d/t acute encephalopathy. CT head and neck revealed no vascular pathology.     Pt presents with poor strength, endurance, and balance, requires one person assist for mobility and adaptive equipment, indicating function well below baseline IND level of function.     Prognosis: Good, no significant barriers to participation at this time.     Discharge recommendations:  Recommend assisted living facility.  (PLOF) with home health or therapy provided by [facility]    Equipment: owns RW, no further needs expected    Subjective: Pt sleeping in bed, requires increased time to become alert, agreeable to PT. Increased response times.     Pain: 0/10    Communication with other providers: RN, PCA/sitter    Home Setup/Prior level of function  Social/Functional History  Lives With: Alone  Type of Home: Assisted living ([facility] home)  Home Layout: One level  Home Access: Level entry  Home Equipment: Walker - Rolling  ADL Assistance: Independent  Homemaking Assistance: Needs assistance  Homemaking Responsibilities: Yes  Ambulation Assistance: Independent  Transfer Assistance: Independent  Active : No    Examination of body systems (includes body structures/functions, activity/participation limitations):  Vision: diminished  Hearing: [place]  Cardiopulmonary: poor endurance  Cognition: alert. oriented to name only. \"2022\".    Musculoskeletal  ROM BLE: WFL  Strength RLE: grossly 4/5 MMT  Strength LLE: grossly 4/5 MMT   Neuro: NT, appears intact  Proprioception: NT, appears WFL    Mobility:  Rolling L/R: modA  Supine to sit: modA for Les and trunk  Sitting balance: CG progressing to SUP EOB  Sit to stand: modA with B hands on RW  Standing balance: CG with BUE on RW  Transfer: returned to bed, ambulatory with RW, CG + VC for targeting  Gait: ambulates 35' with RW, downward

## 2024-05-10 NOTE — PROGRESS NOTES
Admit Date:  5/7/2024    Admission diagnosis / Complaint :   Altered mental status    Subjective:  Mr. Ugarte is resting in bed.  Patient combative overnight.    Objective:   /71   Pulse 66   Temp 98.4 °F (36.9 °C) (Oral)   Resp 28   Ht 1.854 m (6' 1\")   Wt 102.1 kg (225 lb 1.4 oz)   SpO2 92%   BMI 29.70 kg/m²     Intake/Output Summary (Last 24 hours) at 5/10/2024 0939  Last data filed at 5/10/2024 0619  Gross per 24 hour   Intake 73.81 ml   Output 960 ml   Net -886.19 ml       TELEMETRY: Sinus with frequent PACs and PVCs   has a past medical history of Atrial fibrillation (HCC), Cerebral artery occlusion with cerebral infarction (HCC), Diabetes mellitus (HCC), Hyperlipidemia, and Hypertension.   has no past surgical history on file.  Physical Exam:  General:  Awake, alert, altered mental status  Skin:  Warm and dry  Neck:  JVD not appreciated  Chest:  Clear to auscultation, respiration easy  Cardiovascular: Irregular rate and rhythm, S1S2, grade 2 out of 6 systolic murmur  Abdomen:  Soft nontender  Extremities:  no edema    Medications:    mupirocin   Topical BID    cefepime  2,000 mg IntraVENous Q12H    sodium chloride flush  5-40 mL IntraVENous 2 times per day    vancomycin  1,500 mg IntraVENous Q24H    metoprolol tartrate  25 mg Oral BID    midodrine  5 mg Oral TID WC    sodium chloride flush  5-40 mL IntraVENous 2 times per day    aspirin  81 mg Oral Daily    Or    aspirin  300 mg Rectal Daily    atorvastatin  80 mg Oral Nightly    apixaban  2.5 mg Oral BID    carboxymethylcellulose  1 drop Both Eyes TID    Vitamin D  5,000 Units Oral Daily    divalproex  125 mg Oral Daily    divalproex  500 mg Oral Nightly    docusate sodium  100 mg Oral Daily    b complex vitamins  1 capsule Oral Daily with breakfast    pantoprazole  40 mg Oral QAM AC    polyethylene glycol  17 g Oral Daily    potassium bicarb-citric acid  20 mEq Oral Daily    furosemide  40 mg IntraVENous Daily    insulin lispro  0-8 Units

## 2024-05-10 NOTE — CONSULTS
Consult completed. [employer] [place] Safety® Deep Access 20g 2 ½\" IV catheter initiated into RUE cephalic vein x 2 attempt using ultrasound guided technique. (1st attempt with PowerGlide Pro: unable to advance wire to thread off  catheter. RUE 3cm distal to current cannulation site). Brisk blood return, flushes without resistance. Labs collected and sent. Patient tolerated well. Primary nurse Laura RN notified.      Consult the Vascular Access Team for questions, concerns, or change in patient's condition.

## 2024-05-10 NOTE — PROGRESS NOTES
Patient did not receive complete dose of his HS medication, because he spits out the crushed meds mix with Apple sauce. He is able to get down a small amount.   Patient is confused at this time. He is aggressive and attempted pullinng the only IV access on him, tele wires etc. He is on Continuous Cardizem gtt and ABT. The two are not compatible, so I did not start the Diltiazem gtt yet. He is concern for self and staff harm.   Dr. Loza is updated face-to-face. He assessed pt already. See orders. I place soft b/l wrist restraints per order.  Sitter remains at bedside. Charge RN updated.  Patient  child, (Luana), is contacted but unable to reach her. VM left regarding parent placed on Soft restraints.

## 2024-05-10 NOTE — PROGRESS NOTES
Vascular/Interventional Neurology Progress Note  [facility]  Patient Name: Eliazar Ugarte     : 1940      Subjective:     The patient was seen and examined. The patient is more confused and combative overnight.       Objective:   Scheduled Meds:   mupirocin   Topical BID    cefepime  2,000 mg IntraVENous Q12H    sodium chloride flush  5-40 mL IntraVENous 2 times per day    vancomycin  1,500 mg IntraVENous Q24H    metoprolol tartrate  25 mg Oral BID    midodrine  5 mg Oral TID WC    sodium chloride flush  5-40 mL IntraVENous 2 times per day    aspirin  81 mg Oral Daily    Or    aspirin  300 mg Rectal Daily    atorvastatin  80 mg Oral Nightly    apixaban  2.5 mg Oral BID    carboxymethylcellulose  1 drop Both Eyes TID    Vitamin D  5,000 Units Oral Daily    divalproex  125 mg Oral Daily    divalproex  500 mg Oral Nightly    docusate sodium  100 mg Oral Daily    b complex vitamins  1 capsule Oral Daily with breakfast    pantoprazole  40 mg Oral QAM AC    polyethylene glycol  17 g Oral Daily    potassium bicarb-citric acid  20 mEq Oral Daily    furosemide  40 mg IntraVENous Daily    insulin lispro  0-8 Units SubCUTAneous TID WC    insulin lispro  0-4 Units SubCUTAneous Nightly    miconazole   Topical BID     Continuous Infusions:   dilTIAZem 7.5 mg/hr (05/10/24 0635)    sodium chloride 25 mL (24 1440)    dextrose       PRN Meds:.sodium chloride flush, acetaminophen, ondansetron **OR** ondansetron, sodium chloride flush, sodium chloride, polyethylene glycol, melatonin, glucose, dextrose bolus **OR** dextrose bolus, glucagon (rDNA), dextrose, prochlorperazine **OR** prochlorperazine    Vital Signs:  Vitals:    05/10/24 0915   BP: 126/71   Pulse: 66   Resp:    Temp:    SpO2:         General: A&O x 1, NAD, cooperative  HEENT: NC/AT, EOMI, PERRL, mmm, neck supple  Extremities: no edema, no calf tenderness b/l    Neurological Exam:         Mental Status:  A&O to self only, NAD, speech clear,   follows most commands appropriately     Cranial Nerves:  CN II-XII intact     Sensation: intact LT/temp UE/LE's b/l     Motor: 5/5 UE/LE's b/l, tone and bulk normal, no pronator drift     Reflexes: 2/4 biceps, triceps, brachioradialis, patellar, and achilles bilaterally; flexor plantar responses bilaterally     Coordination:       Tremors-- None       Rapidly alternating movements (KIM): No dysdiadonchokinesis b/l      Heel-Shin: No dysmetria b/l      Finger-Nose: No dysmetria b/l     Gait/Station: Deferred    Labs:   Recent Labs     05/07/24  1949 05/08/24  0318 05/09/24  1415 05/10/24  0111   WBC 12.0* 11.0* 5.3 8.3   * 136 139 139   K 4.2 4.2 3.9 4.4   CL 96* 99 99 106   CO2 21 25 28 24   BUN 18 18 21 21   CREATININE 1.5* 1.5* 1.2 1.2   GLUCOSE 204* 153* 187* 133*   INR 1.4  --   --   --    ,Last TSH Results[unfilled],Last Free T4 Results[unfilled],[unfilled],[unfilled]  Last Liver Function Results:  @LABRCNTIP(ALT:3,AST:3,BILITOTAL:3,BILIDIR:3,ALKPHOS:3)@     Imaging Studies:     CT Head:     IMPRESSION:     No evidence of acute intracranial abnormality.              Electronically signed by Shine Ignacio MD             Specimen Collected: 05/07/24 19:45 EDT Last Resulted: 05/07/24 19:46 EDT            CTA Head and Neck:     IMPRESSION:     No significant intracranial arterial vascular pathology is identified.     There is nonenhancement of the dural venous sinuses which may be due to early phase of contrast opacification but the possibility of thrombosis cannot be excluded on the basis of this exam.     Moderately advanced atherosclerotic calcification is seen in the region of the carotid bifurcation on the left in the proximal left internal carotid artery. There is small adherent thrombus seen within the proximal left internal   carotid artery with approximately 40% diameter stenosis.     Electronically signed by Shine Ignacio MD             Specimen Collected: 05/07/24 19:51 EDT Last Resulted: 05/07/24  20:01 ED               MRI Brain:     IMPRESSION:  1. No evidence for acute or subacute ischemic process the brain  2. Moderate periventricular chronic lupus encephalopathy  3. Scattered less than 1 cm high signal foci identified in the long TE sequences that may related to areas of ischemic demyelination versus remote infarcts     Electronically signed by MD Marquis Ovalles             Specimen Collected: 05/08/24 16:01 EDT Last Resulted: 05/08/24 16:03 EDT             Assessment/Plan:      83-year-old male presenting with altered mental status, fall, and SVT.  Cerebral angiogram demonstrating bilateral vertebral artery stenosis R>L, with adequate flow of the left vertebral artery.  Diffuse atherosclerotic changes and mild left ICA stenosis.  No thrombus identified.         -Neuroimaging as above.  -MRI of the brain negative for acute/subacute ischemia.  Moderate periventricular chronic lupus encephalopathy.  Scattered less than 1 cm high signal foci identified in the long TE sequences that may related to areas of ischemic demyelination versus remote infarcts  -Pertinent images discussed/reviewed with Dr. Magdaleno who has fully participated in the care of this patient and agrees with plan.   -70% irregular stenosis of the proximal portion of the right cervical vertebral artery.  Flow is delayed from this lesion.  There is a second stenosis in the V3 segment of 50%.  There is adequate flow from the left vertebral artery.  50% stenosis at the origin of the left vertebral artery.  Atherosclerotic changes identified in the bilateral posterior cerebral arteries.  20 to 30% stenosis at the proximal portion of the left internal carotid artery without any flow-limiting lesion or identifiable thrombus.  Diffuse atherosclerotic changes.  -No intervention recommended.  -Agree with cardiology recommendations of Eliquis 5 mg BID for stroke prevention.    -Continue Eliquis, Asa, and Lipitor for secondary stroke prevention  -PT/OT as  able        Electronically signed by KALI Talbert CNP on 5/10/2024 at 9:14 AM   5/10/2024

## 2024-05-10 NOTE — PROGRESS NOTES
CARDIOLOGY PROGRESS NOTE                                                  Name:  Eliazar Ugarte /Age/Sex: 1940  (83 y.o. male)   MRN & CSN:  [id number] & [id number] Admission Date/Time: 2024  7:19 PM   Location:  [facility] PCP: Roberta Schafer         Admit Date:  2024  Hospital Day: 4      SUBJECTIVE:     Seen patient as follow up as consultation for  AF       TELEMETRY: AF         Intake/Output Summary (Last 24 hours) at 5/10/2024 1508  Last data filed at 5/10/2024 0619  Gross per 24 hour   Intake 73.81 ml   Output 960 ml   Net -886.19 ml       Assessment/Plan:        Atrial fibrillation with rapid ventricular response.  Acute stroke workup as per neurology  Paroxysmal atrial fibrillation on anticoagulation at home  Possible SVT s/p adenosine given by EMS, rhythm strips not available  Underlying mild dementia, nursing home resident  History of coronary disease s/p CABG  Essential hypertension  Hyperlipidemia  History of ischemic cardiomyopathy with recovered LV ejection fraction in the past     Plans for diagnostic cerebral angiogram noted as per interventional neurology    Rate control strategy for atrial fibrillation  Currently rate controlled with heart rate of around 85 bpm  Continue with metoprolol tartrate 25 twice daily  Not a candidate for antiarrhythmics given prolonged QT  Watchman can be considered as per EP    Eliquis is currently on hold, neurology/neuro surgery evaluation pending.  Continue with aspirin  Continue with statins  Stop IV Lasix  Start oral Lasix 20 mg daily    Elevated troponin likely in the setting of acute stroke and non-ACS in nature.  Demand ischemia type II MI.  No cardiac ischemic workup is planned    Please call us with any further questions       ECHO         Left Ventricle: Low normal left ventricular systolic function with a visually estimated EF of 50 - 55%. Mild posterior thickening. Sigmoid septum causing turbulent flow through the LVOT.    Aortic

## 2024-05-11 LAB
ANION GAP SERPL CALCULATED.3IONS-SCNC: 13 MMOL/L (ref 7–16)
BASOPHILS ABSOLUTE: 0.1 K/CU MM
BASOPHILS RELATIVE PERCENT: 0.8 % (ref 0–1)
BUN SERPL-MCNC: 17 MG/DL (ref 6–23)
CALCIUM SERPL-MCNC: 8 MG/DL (ref 8.3–10.6)
CHLORIDE BLD-SCNC: 103 MMOL/L (ref 99–110)
CO2: 24 MMOL/L (ref 21–32)
CREAT SERPL-MCNC: 1.1 MG/DL (ref 0.9–1.3)
DIFFERENTIAL TYPE: ABNORMAL
DOSE AMOUNT: NORMAL
DOSE TIME: NORMAL
EOSINOPHILS ABSOLUTE: 0.2 K/CU MM
EOSINOPHILS RELATIVE PERCENT: 2.8 % (ref 0–3)
GFR, ESTIMATED: 67 ML/MIN/1.73M2
GLUCOSE BLD-MCNC: 128 MG/DL (ref 70–99)
GLUCOSE BLD-MCNC: 136 MG/DL (ref 70–99)
GLUCOSE BLD-MCNC: 150 MG/DL (ref 70–99)
GLUCOSE BLD-MCNC: 174 MG/DL (ref 70–99)
GLUCOSE SERPL-MCNC: 117 MG/DL (ref 70–99)
HCT VFR BLD CALC: 41.2 % (ref 42–52)
HEMOGLOBIN: 13.1 GM/DL (ref 13.5–18)
IMMATURE NEUTROPHIL %: 0.6 % (ref 0–0.43)
LYMPHOCYTES ABSOLUTE: 1.5 K/CU MM
LYMPHOCYTES RELATIVE PERCENT: 23.1 % (ref 24–44)
MCH RBC QN AUTO: 31.6 PG (ref 27–31)
MCHC RBC AUTO-ENTMCNC: 31.8 % (ref 32–36)
MCV RBC AUTO: 99.5 FL (ref 78–100)
MONOCYTES ABSOLUTE: 0.7 K/CU MM
MONOCYTES RELATIVE PERCENT: 10.4 % (ref 0–4)
NEUTROPHILS ABSOLUTE: 3.9 K/CU MM
NEUTROPHILS RELATIVE PERCENT: 62.3 % (ref 36–66)
NUCLEATED RBC %: 0 %
PDW BLD-RTO: 14.6 % (ref 11.7–14.9)
PLATELET # BLD: 166 K/CU MM (ref 140–440)
PMV BLD AUTO: 9.6 FL (ref 7.5–11.1)
POTASSIUM SERPL-SCNC: 3.8 MMOL/L (ref 3.5–5.1)
RBC # BLD: 4.14 M/CU MM (ref 4.6–6.2)
SODIUM BLD-SCNC: 140 MMOL/L (ref 135–145)
TOTAL IMMATURE NEUTOROPHIL: 0.04 K/CU MM
TOTAL NUCLEATED RBC: 0 K/CU MM
VANCOMYCIN RANDOM: 15.1 UG/ML
WBC # BLD: 6.3 K/CU MM (ref 4–10.5)

## 2024-05-11 PROCEDURE — 2580000003 HC RX 258: Performed by: STUDENT IN AN ORGANIZED HEALTH CARE EDUCATION/TRAINING PROGRAM

## 2024-05-11 PROCEDURE — 2140000000 HC CCU INTERMEDIATE R&B

## 2024-05-11 PROCEDURE — 6370000000 HC RX 637 (ALT 250 FOR IP): Performed by: STUDENT IN AN ORGANIZED HEALTH CARE EDUCATION/TRAINING PROGRAM

## 2024-05-11 PROCEDURE — 6370000000 HC RX 637 (ALT 250 FOR IP): Performed by: INTERNAL MEDICINE

## 2024-05-11 PROCEDURE — 2580000003 HC RX 258: Performed by: INTERNAL MEDICINE

## 2024-05-11 PROCEDURE — 94761 N-INVAS EAR/PLS OXIMETRY MLT: CPT

## 2024-05-11 PROCEDURE — 6360000002 HC RX W HCPCS: Performed by: STUDENT IN AN ORGANIZED HEALTH CARE EDUCATION/TRAINING PROGRAM

## 2024-05-11 PROCEDURE — 82962 GLUCOSE BLOOD TEST: CPT

## 2024-05-11 PROCEDURE — 2580000003 HC RX 258: Performed by: NURSE PRACTITIONER

## 2024-05-11 PROCEDURE — 92526 ORAL FUNCTION THERAPY: CPT

## 2024-05-11 PROCEDURE — 85025 COMPLETE CBC W/AUTO DIFF WBC: CPT

## 2024-05-11 PROCEDURE — 80048 BASIC METABOLIC PNL TOTAL CA: CPT

## 2024-05-11 PROCEDURE — 51798 US URINE CAPACITY MEASURE: CPT

## 2024-05-11 PROCEDURE — 36415 COLL VENOUS BLD VENIPUNCTURE: CPT

## 2024-05-11 PROCEDURE — 6370000000 HC RX 637 (ALT 250 FOR IP): Performed by: NURSE PRACTITIONER

## 2024-05-11 PROCEDURE — 80202 ASSAY OF VANCOMYCIN: CPT

## 2024-05-11 RX ORDER — LINEZOLID 600 MG/1
600 TABLET, FILM COATED ORAL 2 TIMES DAILY
Qty: 14 TABLET | Refills: 0 | Status: CANCELLED | OUTPATIENT
Start: 2024-05-11 | End: 2024-05-18

## 2024-05-11 RX ORDER — MIDODRINE HYDROCHLORIDE 5 MG/1
5 TABLET ORAL
Qty: 90 TABLET | Refills: 3 | Status: SHIPPED | OUTPATIENT
Start: 2024-05-11 | End: 2024-05-21

## 2024-05-11 RX ORDER — ASPIRIN 81 MG/1
81 TABLET, CHEWABLE ORAL DAILY
Qty: 30 TABLET | Refills: 3 | Status: SHIPPED | OUTPATIENT
Start: 2024-05-12 | End: 2024-05-21

## 2024-05-11 RX ORDER — SULFAMETHOXAZOLE AND TRIMETHOPRIM 800; 160 MG/1; MG/1
1 TABLET ORAL 2 TIMES DAILY
Qty: 14 TABLET | Refills: 0 | Status: SHIPPED | OUTPATIENT
Start: 2024-05-11 | End: 2024-05-21 | Stop reason: HOSPADM

## 2024-05-11 RX ADMIN — Medication 5000 UNITS: at 11:09

## 2024-05-11 RX ADMIN — CEFEPIME 2000 MG: 2 INJECTION, POWDER, FOR SOLUTION INTRAVENOUS at 18:18

## 2024-05-11 RX ADMIN — MIDODRINE HYDROCHLORIDE 5 MG: 5 TABLET ORAL at 11:09

## 2024-05-11 RX ADMIN — DIVALPROEX SODIUM 500 MG: 125 CAPSULE, COATED PELLETS ORAL at 21:22

## 2024-05-11 RX ADMIN — MUPIROCIN: 20 OINTMENT TOPICAL at 21:20

## 2024-05-11 RX ADMIN — VANCOMYCIN HYDROCHLORIDE 1500 MG: 1.5 INJECTION, POWDER, LYOPHILIZED, FOR SOLUTION INTRAVENOUS at 14:14

## 2024-05-11 RX ADMIN — SODIUM CHLORIDE, PRESERVATIVE FREE 20 ML: 5 INJECTION INTRAVENOUS at 14:00

## 2024-05-11 RX ADMIN — PANTOPRAZOLE SODIUM 40 MG: 40 TABLET, DELAYED RELEASE ORAL at 05:57

## 2024-05-11 RX ADMIN — ASPIRIN 81 MG: 81 TABLET, CHEWABLE ORAL at 11:09

## 2024-05-11 RX ADMIN — MIDODRINE HYDROCHLORIDE 5 MG: 5 TABLET ORAL at 18:15

## 2024-05-11 RX ADMIN — METOPROLOL TARTRATE 25 MG: 25 TABLET, FILM COATED ORAL at 11:09

## 2024-05-11 RX ADMIN — MICONAZOLE NITRATE: 2 POWDER TOPICAL at 21:23

## 2024-05-11 RX ADMIN — DIVALPROEX SODIUM 125 MG: 125 CAPSULE, COATED PELLETS ORAL at 10:29

## 2024-05-11 RX ADMIN — SODIUM CHLORIDE, PRESERVATIVE FREE 10 ML: 5 INJECTION INTRAVENOUS at 21:21

## 2024-05-11 RX ADMIN — DOCUSATE SODIUM 100 MG: 100 CAPSULE, LIQUID FILLED ORAL at 11:09

## 2024-05-11 RX ADMIN — APIXABAN 5 MG: 5 TABLET, FILM COATED ORAL at 11:09

## 2024-05-11 RX ADMIN — CARBOXYMETHYLCELLULOSE SODIUM 1 DROP: 10 GEL OPHTHALMIC at 21:21

## 2024-05-11 RX ADMIN — FUROSEMIDE 20 MG: 20 TABLET ORAL at 11:09

## 2024-05-11 ASSESSMENT — PAIN SCALES - GENERAL: PAINLEVEL_OUTOF10: 0

## 2024-05-11 NOTE — PROGRESS NOTES
RENAL DOSE ADJUSTMENT MADE PER P/T PROTOCOL    PREVIOUS ORDER:  Cefepime 2000 mg IV every 12 hours extended infusion  Indication: Pneumonia (CAP)    Estimated Creatinine Clearance: 63 mL/min (based on SCr of 1.1 mg/dL).  Recent Labs     05/09/24  1415 05/10/24  0111 05/11/24  0213   BUN 21 21 17   CREATININE 1.2 1.2 1.1    158 166     NEW RENALLY ADJUSTED ORDER:  Cefepime 2000 mg IV every 8 hours extended infusion    Sofia Holguin RP, PharmD, BCPS  5/11/2024 8:51 AM

## 2024-05-11 NOTE — PLAN OF CARE
Problem: Safety - Adult  Goal: Free from fall injury  Outcome: Progressing  Flowsheets (Taken 5/10/2024 0232)  Free From Fall Injury: Instruct family/caregiver on patient safety     Problem: Discharge Planning  Goal: Discharge to home or other facility with appropriate resources  Outcome: Progressing  Flowsheets (Taken 5/10/2024 0232)  Discharge to home or other facility with appropriate resources:   Identify barriers to discharge with patient and caregiver   Arrange for needed discharge resources and transportation as appropriate     Problem: Pain  Goal: Verbalizes/displays adequate comfort level or baseline comfort level  Outcome: Progressing     Problem: Skin/Tissue Integrity  Goal: Absence of new skin breakdown  Description: 1.  Monitor for areas of redness and/or skin breakdown  2.  Assess vascular access sites hourly  3.  Every 4-6 hours minimum:  Change oxygen saturation probe site  4.  Every 4-6 hours:  If on nasal continuous positive airway pressure, respiratory therapy assess nares and determine need for appliance change or resting period.  Outcome: Progressing     Problem: ABCDS Injury Assessment  Goal: Absence of physical injury  Outcome: Progressing  Flowsheets (Taken 5/11/2024 0356)  Absence of Physical Injury: Implement safety measures based on patient assessment     Problem: Chronic Conditions and Co-morbidities  Goal: Patient's chronic conditions and co-morbidity symptoms are monitored and maintained or improved  Outcome: Progressing     Problem: Safety - Medical Restraint  Goal: Remains free of injury from restraints (Restraint for Interference with Medical Device)  Description: INTERVENTIONS:  1. Determine that other, less restrictive measures have been tried or would not be effective before applying the restraint  2. Evaluate the patient's condition at the time of restraint application  3. Inform patient/family regarding the reason for restraint  4. Q2H: Monitor safety, psychosocial status,  comfort, nutrition and hydration  Outcome: Progressing     Problem: Safety - Violent/Self-destructive Restraint  Goal: Remains free of injury from restraints (Restraint for Violent/Self-Destructive Behavior)  Description: INTERVENTIONS:  1. Determine that de-escalation and other, less restrictive measures have been tried or would not be effective before applying the restraint  2. Identify and document the criteria for restraint  3. Evaluate the patient's condition at the time of restraint application  4. Inform patient/family regarding the reason for restraint/seclusion  5. Q2H: Monitor comfort, nutrition and hydration needs  6. Q15M: Perform safety checks including skin, circulation, sensory, respiratory and psychological status  7. Ensure continuous observation  8. Identify and implement measures to help patient regain control, assess readiness for release and initiate progressive release per policy  Outcome: Progressing

## 2024-05-11 NOTE — DISCHARGE INSTR - COC
Continuity of Care Form    Patient Name: Eliazar Ugarte   :  1940  MRN:  [id number]    Admit date:  2024  Discharge date:  2024    Code Status Order: Limited   Advance Directives:     Admitting Physician:  Gabe Call MD  PCP: Roberta Schafer    Discharging Nurse: Haley Ding RN  Discharging Hospital Unit/Room#: 3107/3107-A  Discharging Unit Phone Number: [phone]    Emergency Contact:   Extended Emergency Contact Information  Primary Emergency Contact: JULIA SANTIAGO  Home Phone: [phone]  Relation: Child  Preferred language: English    Past Surgical History:  History reviewed. No pertinent surgical history.    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, without behavioral disturbance, severe (HCC) F01.C0    Other sequelae of other cerebrovascular disease I69.898    KALLIE (generalized anxiety disorder) F41.1    Acute encephalopathy G93.40    Altered mental status R41.82    Atrial fibrillation with RVR (HCC) I48.91    Pneumonia due to infectious organism J18.9    Thrombus I82.90       Isolation/Infection:   Isolation            Contact          Patient Infection Status       Infection Onset Added Last Indicated Last Indicated By Review Planned Expiration Resolved Resolved By    MRSA 24 MRSA by PCR                           Nurse Assessment:  Last Vital Signs: /74   Pulse 58   Temp 97.5 °F (36.4 °C) (Oral)   Resp 12   Ht 1.854 m (6' 1\")   Wt 100.3 kg (221 lb 1.9 oz)   SpO2 97%   BMI 29.17 kg/m²     Last documented pain score (0-10 scale):  (cm^2) 2.64 cm^2 05/08/24 0830   Wound Volume (cm^3) 0.264 cm^3 05/08/24 0830   Distance Tunneling (cm) 0 cm 05/09/24 1600   Tunneling Position ___ O'Clock 0 05/09/24 1600   Undermining Starts ___ O'Clock 0 05/09/24 1600   Undermining Ends___ O'Clock 0 05/09/24 1600   Undermining Maxium Distance (cm) 0 05/09/24 1600   Wound Assessment Pink/red 05/09/24 1600   Drainage Amount Scant (moist but unmeasurable) 05/09/24 1600   Drainage Description Thin 05/09/24 1600   Odor None 05/09/24 1600   Alla-wound Assessment Blanchable erythema 05/09/24 1600   Margins Defined edges 05/09/24 1600   Wound Thickness Description not for Pressure Injury Partial thickness 05/09/24 1600   Number of days: 3       Wound 05/11/24 Chest Lateral;Lower;Right (Active)   Number of days: 0        Elimination:  Continence:   Bowel: No  Bladder: No  Urinary Catheter: None   Colostomy/Ileostomy/Ileal Conduit: No       Date of Last BM: Last charted 05/16/2024    Intake/Output Summary (Last 24 hours) at 5/11/2024 1331  Last data filed at 5/10/2024 2140  Gross per 24 hour   Intake 20 ml   Output 500 ml   Net -480 ml     I/O last 3 completed shifts:  In: 93.8 [I.V.:48.1; IV Piggyback:45.7]  Out: 1460 [Urine:1460]    Safety Concerns:     At Risk for Falls and History of Seizures    Impairments/Disabilities:      Vision        Patient's personal belongings (please select all that are sent with patient):  None    RN SIGNATURE:  Electronically signed by Haley Ding RN on 5/23/24 at 11:54 AM EDT    CASE MANAGEMENT/SOCIAL WORK SECTION    Inpatient Status Date: ***    Readmission Risk Assessment Score:  Readmission Risk              Risk of Unplanned Readmission:  27           Discharging to Facility/ Agency   Name: [facility]AB[facility]  Address: [address] [place] [address]  Phone: [phone]  Fax: [phone]    Dialysis Facility (if applicable)   Name:  Address:  Dialysis Schedule:  Phone:  Fax:      PHYSICIAN SECTION    Nutrition Therapy:  Current Nutrition Therapy:   -

## 2024-05-11 NOTE — CARE COORDINATION
Received call from Dia NORIEGA stating that pt has a discharge order for return to [facility] but has had a sitter and wants to verify that AL can accept pt today or does he have to be sitter free for 24 hrs ( pt has dementia). Luis Angel spoke with [facility] Nursing Supervisor, Od[place], at ph# [phone]. Odessa RN states that she will need to check with her Director and she will call LUIS ANGEL back. Dia NORIEGA updated and Luis Angel will let Dia NORIEGA known once a return call from [facility] nsg supervisor is received.    Luis Angel  spoke with Dorothy in admissions and received return call from [place] stating that pt must be sitter free 24 hrs prior to return to [facility]. Pt can have a telesitter. RN. Dia on 3N advised. Perfect serve to MD rose; the same.

## 2024-05-11 NOTE — PROGRESS NOTES
Notified Dr Hurley of patient needing to be sitter free prior to dc. Sitter removed from room and COMPA placed at this time. Dr Hurley will notify patient's family.

## 2024-05-11 NOTE — PROGRESS NOTES
V2.0  [facility] Hospitalist Progress Note      Name:  Eliazar Ugarte /Age/Sex: 1940  (83 y.o. male)   MRN & CSN:  [id number] & [id number] Encounter Date/Time: 2024 12:25 PM EDT    Location:  [address] [place] [address]A PCP: Roberta Schafer       [facility] Day: 5    Assessment and Plan:   Eliazar Ugarte is a 83 y.o. male  who presents with Acute encephalopathy      Plan:        #.  Pneumonia  -Chest x-ray-right upper lobe suprahilar airspace disease   -Blood cultures drawn in ED  -Procalcitonin 0.199  -IV antibiotics: Vancomycin plus cefepime   Plan is for transition to oral at discharge    #.  Strokelike symptoms  -Stroke alert called in ED  -Last well-known-5 PM  -NIHSS could not be done as patient is currently sleeping, not able to be kept awake.  -CT head-no acute intracranial abnormality  -CTA head and neck-atherosclerotic changes noted in the region of the carotid bifurcation bilaterally.  Left greater than the right with approximately 40% diameter stenosis and small focal adherent thrombus seen within the lumen of the proximal left internal carotid artery.  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  -Patient was not deemed a TNK candidate as he is on Eliquis.  -NIHSS/PT/OT/SLP evaluation ordered  -MRI brain: No acute stroke  -Continue aspirin, statin  -Consult neurology and Neuro interventional  -Angiogram 24     #.  SVT  -As per documentation, enroute to the hospital  -Received 6 mg, 12 mg of adenosine  -EKG on ridochy-R-lgu with RVR-.  -Consult cardiology.  -Continune Beta blocker     #.  Abnormal troponin  -Troponin 93, 106  -EKG-no acute ST-T wave changes.  -Serial troponins, repeat EKG  -Consult cardiology.     #.  JOSH on CKD  -Creatinine 1.1-2024, 1.5 today  -Diurese and monitor      #.  Fall  -CT head, CT C-spine-no acute process  -PT/OT evaluation when appropriate.     #.  Coronary artery disease s/p CABG as per documentation from nursing home     #.  BEBETO     #.  Congestive heart failure  MD      Comment: Please note this report has been produced using speech recognition software and may contain errors related to that system including errors in grammar, punctuation, and spelling, as well as words and phrases that may be inappropriate. If there are any questions or concerns please feel free to contact the dictating provider for clarification.     Electronically signed by German Hurley MD on 5/11/2024 at 3:20 PM

## 2024-05-11 NOTE — PROGRESS NOTES
PHARMACY VANCOMYCIN MONITORING SERVICE  Pharmacy consulted by Dr. Solomon for monitoring and adjustment.    Indication for treatment: Vancomycin indication: CAP with risk factors  Goal trough: Trough Goal: 15-20 mcg/mL  AUC/KEMAL: 400-600    Risk Factors for MRSA Identified:   Documented isolation of MRSA within 1 year     Pertinent Laboratory Values:   Temp Readings from Last 3 Encounters:   05/11/24 97.5 °F (36.4 °C) (Oral)   02/02/23 97.5 °F (36.4 °C) (Oral)     Recent Labs     05/09/24  1415 05/10/24  0111 05/11/24  0213   WBC 5.3 8.3 6.3     Recent Labs     05/09/24  1415 05/10/24  0111 05/11/24  0213   BUN 21 21 17   CREATININE 1.2 1.2 1.1     Estimated Creatinine Clearance: 63 mL/min (based on SCr of 1.1 mg/dL).    Intake/Output Summary (Last 24 hours) at 5/11/2024 0843  Last data filed at 5/10/2024 2140  Gross per 24 hour   Intake 20 ml   Output 500 ml   Net -480 ml     Last Encounter Weight:  Wt Readings from Last 3 Encounters:   05/11/24 100.3 kg (221 lb 1.9 oz)   02/01/23 88.8 kg (195 lb 12.8 oz)       Pertinent Cultures:   Date    Source    Results  05/07/24  Blood    No growth at 72 hours  05/08/24  MRSA Nasal   Positive    Vancomycin level:   TROUGH:  No results for input(s): \"VANCOTROUGH\" in the last 72 hours.  RANDOM:    Recent Labs     05/11/24  0213   VANCORANDOM 15.1       Assessment:  HPI: 83 y.o. male with coronary artery disease s/p CABG, hypertension, diabetes mellitus type 2, BEBETO as per documentation, dementia, history of CVA, BPH, congestive heart failure, who was sent from the nursing home for confusion, fall, strokelike symptoms.  Patient is currently in a deep sleep, was hard to wake him up.  Did not follow any commands.  Most of the information was on review of records.   SCr, BUN, and urine output:   Scr 1.1 mg/dL today, baseline appears to be 1.1-1.3 mg/dL  BUN within normal limits  Adequate urine output  Day(s) of therapy: 3 of 7  Vancomycin concentration:  5/11/24 - 15.1 mg/L ~13h

## 2024-05-11 NOTE — PROGRESS NOTES
1800 Pt did not eat well today. He had one cup pudding anna cup apple sauce. He did get restless and agitated earlier today. When bladder scanned 700 ml. Straight cath pt had 750 ml out. Has not been restless since. Alert co operative. Not no stool today.

## 2024-05-12 LAB
ANION GAP SERPL CALCULATED.3IONS-SCNC: 13 MMOL/L (ref 7–16)
BASOPHILS ABSOLUTE: 0.1 K/CU MM
BASOPHILS RELATIVE PERCENT: 1 % (ref 0–1)
BUN SERPL-MCNC: 14 MG/DL (ref 6–23)
CALCIUM IONIZED: 4.48 MG/DL (ref 4.48–5.28)
CALCIUM SERPL-MCNC: 8 MG/DL (ref 8.3–10.6)
CHLORIDE BLD-SCNC: 107 MMOL/L (ref 99–110)
CO2: 24 MMOL/L (ref 21–32)
CREAT SERPL-MCNC: 1.2 MG/DL (ref 0.9–1.3)
CULTURE: NORMAL
CULTURE: NORMAL
DIFFERENTIAL TYPE: ABNORMAL
EOSINOPHILS ABSOLUTE: 0.2 K/CU MM
EOSINOPHILS RELATIVE PERCENT: 2.8 % (ref 0–3)
GFR, ESTIMATED: 60 ML/MIN/1.73M2
GLUCOSE BLD-MCNC: 132 MG/DL (ref 70–99)
GLUCOSE BLD-MCNC: 134 MG/DL (ref 70–99)
GLUCOSE BLD-MCNC: 150 MG/DL (ref 70–99)
GLUCOSE SERPL-MCNC: 124 MG/DL (ref 70–99)
HCT VFR BLD CALC: 43.1 % (ref 42–52)
HEMOGLOBIN: 13.9 GM/DL (ref 13.5–18)
IMMATURE NEUTROPHIL %: 0.8 % (ref 0–0.43)
IONIZED CA: 1.12 MMOL/L (ref 1.12–1.32)
LYMPHOCYTES ABSOLUTE: 1.6 K/CU MM
LYMPHOCYTES RELATIVE PERCENT: 22.6 % (ref 24–44)
Lab: NORMAL
Lab: NORMAL
MCH RBC QN AUTO: 31.4 PG (ref 27–31)
MCHC RBC AUTO-ENTMCNC: 32.3 % (ref 32–36)
MCV RBC AUTO: 97.5 FL (ref 78–100)
MONOCYTES ABSOLUTE: 0.8 K/CU MM
MONOCYTES RELATIVE PERCENT: 11.3 % (ref 0–4)
NEUTROPHILS ABSOLUTE: 4.4 K/CU MM
NEUTROPHILS RELATIVE PERCENT: 61.5 % (ref 36–66)
NUCLEATED RBC %: 0 %
PDW BLD-RTO: 14.4 % (ref 11.7–14.9)
PLATELET # BLD: 186 K/CU MM (ref 140–440)
PMV BLD AUTO: 9.6 FL (ref 7.5–11.1)
POTASSIUM SERPL-SCNC: 3.7 MMOL/L (ref 3.5–5.1)
RBC # BLD: 4.42 M/CU MM (ref 4.6–6.2)
SODIUM BLD-SCNC: 144 MMOL/L (ref 135–145)
SPECIMEN: NORMAL
SPECIMEN: NORMAL
TOTAL IMMATURE NEUTOROPHIL: 0.06 K/CU MM
TOTAL NUCLEATED RBC: 0 K/CU MM
WBC # BLD: 7.2 K/CU MM (ref 4–10.5)

## 2024-05-12 PROCEDURE — 6360000002 HC RX W HCPCS: Performed by: STUDENT IN AN ORGANIZED HEALTH CARE EDUCATION/TRAINING PROGRAM

## 2024-05-12 PROCEDURE — 51798 US URINE CAPACITY MEASURE: CPT

## 2024-05-12 PROCEDURE — 6370000000 HC RX 637 (ALT 250 FOR IP): Performed by: NURSE PRACTITIONER

## 2024-05-12 PROCEDURE — 36415 COLL VENOUS BLD VENIPUNCTURE: CPT

## 2024-05-12 PROCEDURE — 2580000003 HC RX 258: Performed by: NURSE PRACTITIONER

## 2024-05-12 PROCEDURE — 82330 ASSAY OF CALCIUM: CPT

## 2024-05-12 PROCEDURE — 94761 N-INVAS EAR/PLS OXIMETRY MLT: CPT

## 2024-05-12 PROCEDURE — 2580000003 HC RX 258: Performed by: STUDENT IN AN ORGANIZED HEALTH CARE EDUCATION/TRAINING PROGRAM

## 2024-05-12 PROCEDURE — 2140000000 HC CCU INTERMEDIATE R&B

## 2024-05-12 PROCEDURE — 6370000000 HC RX 637 (ALT 250 FOR IP): Performed by: INTERNAL MEDICINE

## 2024-05-12 PROCEDURE — 85025 COMPLETE CBC W/AUTO DIFF WBC: CPT

## 2024-05-12 PROCEDURE — 51702 INSERT TEMP BLADDER CATH: CPT

## 2024-05-12 PROCEDURE — 82962 GLUCOSE BLOOD TEST: CPT

## 2024-05-12 PROCEDURE — 80048 BASIC METABOLIC PNL TOTAL CA: CPT

## 2024-05-12 PROCEDURE — 6370000000 HC RX 637 (ALT 250 FOR IP): Performed by: STUDENT IN AN ORGANIZED HEALTH CARE EDUCATION/TRAINING PROGRAM

## 2024-05-12 RX ORDER — TAMSULOSIN HYDROCHLORIDE 0.4 MG/1
0.4 CAPSULE ORAL NIGHTLY
Status: DISCONTINUED | OUTPATIENT
Start: 2024-05-12 | End: 2024-05-23 | Stop reason: HOSPADM

## 2024-05-12 RX ADMIN — Medication 5000 UNITS: at 08:15

## 2024-05-12 RX ADMIN — VITAMIN B COMPLEX 1 CAPSULE: at 08:31

## 2024-05-12 RX ADMIN — CEFEPIME 2000 MG: 2 INJECTION, POWDER, FOR SOLUTION INTRAVENOUS at 01:54

## 2024-05-12 RX ADMIN — POLYETHYLENE GLYCOL (3350) 17 G: 17 POWDER, FOR SOLUTION ORAL at 18:00

## 2024-05-12 RX ADMIN — FUROSEMIDE 20 MG: 20 TABLET ORAL at 08:15

## 2024-05-12 RX ADMIN — MIDODRINE HYDROCHLORIDE 5 MG: 5 TABLET ORAL at 08:16

## 2024-05-12 RX ADMIN — CARBOXYMETHYLCELLULOSE SODIUM 1 DROP: 10 GEL OPHTHALMIC at 22:06

## 2024-05-12 RX ADMIN — DIVALPROEX SODIUM 125 MG: 125 CAPSULE, COATED PELLETS ORAL at 08:31

## 2024-05-12 RX ADMIN — ASPIRIN 81 MG: 81 TABLET, CHEWABLE ORAL at 08:16

## 2024-05-12 RX ADMIN — DIVALPROEX SODIUM 500 MG: 125 CAPSULE, COATED PELLETS ORAL at 22:00

## 2024-05-12 RX ADMIN — SODIUM CHLORIDE, PRESERVATIVE FREE 10 ML: 5 INJECTION INTRAVENOUS at 07:25

## 2024-05-12 RX ADMIN — ACETAMINOPHEN 650 MG: 325 TABLET ORAL at 18:58

## 2024-05-12 RX ADMIN — CEFEPIME 2000 MG: 2 INJECTION, POWDER, FOR SOLUTION INTRAVENOUS at 10:48

## 2024-05-12 RX ADMIN — VANCOMYCIN HYDROCHLORIDE 1500 MG: 1.5 INJECTION, POWDER, LYOPHILIZED, FOR SOLUTION INTRAVENOUS at 16:18

## 2024-05-12 RX ADMIN — ATORVASTATIN CALCIUM 80 MG: 40 TABLET, FILM COATED ORAL at 22:00

## 2024-05-12 RX ADMIN — METOPROLOL TARTRATE 25 MG: 25 TABLET, FILM COATED ORAL at 08:16

## 2024-05-12 RX ADMIN — DOCUSATE SODIUM 100 MG: 100 CAPSULE, LIQUID FILLED ORAL at 08:17

## 2024-05-12 RX ADMIN — CEFEPIME 2000 MG: 2 INJECTION, POWDER, FOR SOLUTION INTRAVENOUS at 18:00

## 2024-05-12 RX ADMIN — ACETAMINOPHEN 650 MG: 325 TABLET ORAL at 10:41

## 2024-05-12 RX ADMIN — MUPIROCIN: 20 OINTMENT TOPICAL at 22:01

## 2024-05-12 RX ADMIN — MIDODRINE HYDROCHLORIDE 5 MG: 5 TABLET ORAL at 16:18

## 2024-05-12 RX ADMIN — SODIUM CHLORIDE, PRESERVATIVE FREE 10 ML: 5 INJECTION INTRAVENOUS at 22:00

## 2024-05-12 RX ADMIN — TAMSULOSIN HYDROCHLORIDE 0.4 MG: 0.4 CAPSULE ORAL at 21:59

## 2024-05-12 RX ADMIN — APIXABAN 5 MG: 5 TABLET, FILM COATED ORAL at 08:16

## 2024-05-12 RX ADMIN — MICONAZOLE NITRATE: 2 POWDER TOPICAL at 22:11

## 2024-05-12 RX ADMIN — METOPROLOL TARTRATE 25 MG: 25 TABLET, FILM COATED ORAL at 21:59

## 2024-05-12 ASSESSMENT — PAIN DESCRIPTION - LOCATION: LOCATION: PENIS

## 2024-05-12 ASSESSMENT — PAIN DESCRIPTION - DESCRIPTORS: DESCRIPTORS: ACHING

## 2024-05-12 NOTE — PROGRESS NOTES
Pt awake restless yelling out \"I don't know where I am please help me\" observed  pt pulling at vance tube noted appearance of blood in tube.  Blader scanned had 195 ml residual. Dr. BOLTON\"[place] notified per   0814 pulled pt up in bed shade pulled up pt eating breakfast feeding self.

## 2024-05-12 NOTE — PROGRESS NOTES
1815 Pt restless attempting to get out of bed. Emotional support given assisted pt to bed rena care given . Pulled upin bed. Pt drank 120 ml of milk with miralax. Has only had three cartons of mil today a few bites of egg  and Czech toast. Note vance draining bright red  no clots 500 ml out

## 2024-05-12 NOTE — CONSULTS
Department of Urology   Consult Note  [facility] 1 2 3 4 5      Date: 2024   Patient: Eliazar Ugarte   : 1940   DOA: 2024   MRN: [id number]   ROOM#: [id number]     Reason for Consult:  urinary retention  Requesting Practitioner:  RACHEL [facility]    CHIEF COMPLAINT:  Altered Mental Status, Fall, and Extremity Weakness     History Obtained From:  electronic medical record    HISTORY OF PRESENT ILLNESS:                The patient is a 83 y.o. diabeticmale with significant past medical history of afib (Eliquis) & HTN who presented with AMS to [facility] ED 24.     He was seen  by our group for urinary retention requiring indwelling catheter.  He didn't follow up in the office thereafter.  He is chronically on Flomax.     Pt unable to provide any reliable history due to dementia.    ED Provider's HPI: \"Eliazar Ugarte is a 83 y.o. male with history of hyperlipidemia hypertension CVA atrial fibrillation diabetes dementia anxiety that presents to the emergency department from home via EMS for altered mental status fall and weakness.  Per EMS they arrived patient's home and patient has some left facial droop they state patient last known well was 5 PM right before he fell down.  They state patient had some somewhat weak  strength when they arrived to his home and they said he was tachycardic elevated heart rate they state it was SVT so they gave 6 mg of adenosine and route as well as 12 mg of adenosine and route.  Upon arrival to the emergency department patient with some left facial droop.  Patient was able to follow commands.  Patient was made a stroke alert and sent to the CT scanner. \"    Past Medical History:        Diagnosis Date    Atrial fibrillation (HCC)     Cerebral artery occlusion with cerebral infarction (HCC)     Diabetes mellitus (HCC)     Hyperlipidemia     Hypertension      Past Surgical History:    History reviewed. No pertinent surgical history.  Current Medications:   Current  complex vitamins capsule 1 capsule, 1 capsule, Oral, Daily with breakfast  pantoprazole (PROTONIX) tablet 40 mg, 40 mg, Oral, QAM AC  polyethylene glycol (GLYCOLAX) packet 17 g, 17 g, Oral, Daily  potassium bicarb-citric acid (EFFER-K) effervescent tablet 20 mEq, 20 mEq, Oral, Daily  insulin lispro (HUMALOG) injection vial 0-8 Units, 0-8 Units, SubCUTAneous, TID WC  insulin lispro (HUMALOG) injection vial 0-4 Units, 0-4 Units, SubCUTAneous, Nightly  glucose chewable tablet 16 g, 4 tablet, Oral, PRN  dextrose bolus 10% 125 mL, 125 mL, IntraVENous, PRN **OR** dextrose bolus 10% 250 mL, 250 mL, IntraVENous, PRN  glucagon injection 1 mg, 1 mg, SubCUTAneous, PRN  dextrose 10 % infusion, , IntraVENous, Continuous PRN  prochlorperazine (COMPAZINE) tablet 10 mg, 10 mg, Oral, Q8H PRN **OR** prochlorperazine (COMPAZINE) injection 10 mg, 10 mg, IntraVENous, Q6H PRN  miconazole (MICOTIN) 2 % powder, , Topical, BID    Allergies:    Patient has no known allergies.    Social History:   TOBACCO:   reports that he has quit smoking. His smoking use included cigarettes. He has quit using smokeless tobacco.  ETOH:   reports that he does not currently use alcohol.  DRUGS:   reports no history of drug use.      Family History:     History reviewed. No pertinent family history.    REVIEW OF SYSTEMS:    Review of systems not obtained due to patient factors - mental status    PHYSICAL EXAM:    VITALS:  BP 91/78 Comment: map 82  Pulse 62   Temp 97.4 °F (36.3 °C) (Axillary)   Resp 20   Ht 1.854 m (6' 1\")   Wt 100.3 kg (221 lb 1.9 oz)   SpO2 94%   BMI 29.17 kg/m²     TEMPERATURE:  Current - Temp: 97.4 °F (36.3 °C); Max - Temp  Av.5 °F (36.4 °C)  Min: 97.4 °F (36.3 °C)  Max: 97.6 °F (36.4 °C)  24HR BLOOD PRESSURE RANGE:  Systolic (24hrs), Av , Min:89 , Max:120   ; Diastolic (24hrs), Av, Min:52, Max:84    8HR INTAKE OUTPUT:  In: 300 [P.O.:300]  Out: -   URINARY CATHETER OUTPUT (Wall):     DRAIN/TUBE OUTPUT:         Gen: Pleasant male, in NAD  Neuro: Non-focal  Resp: Unlabored breathing  CV: Regular rate and rhythm  Abd: Soft, non-distended, non-tender to palpation, no rebound  Back:   No CVAT  Ext: No edema of bilateral LEs  GENITAL/URINARY:  phallus meatus non-circumcised, right and left testis normal, right and left epididymis and vas deferens normal, scrotal skin normal,  hydrocele absent    Data:    WBC:    Lab Results   Component Value Date/Time    WBC 7.2 05/12/2024 01:54 AM     Hemoglobin/Hematocrit:    Lab Results   Component Value Date/Time    HGB 13.9 05/12/2024 01:54 AM    HCT 43.1 05/12/2024 01:54 AM     BMP:    Lab Results   Component Value Date/Time     05/12/2024 01:54 AM    K 3.7 05/12/2024 01:54 AM     05/12/2024 01:54 AM    CO2 24 05/12/2024 01:54 AM    BUN 14 05/12/2024 01:54 AM    CREATININE 1.2 05/12/2024 01:54 AM    CALCIUM 8.0 05/12/2024 01:54 AM    LABGLOM 60 05/12/2024 01:54 AM    LABGLOM >60 02/06/2024 05:40 AM     PT/INR:    Lab Results   Component Value Date/Time    PROTIME 17.1 05/07/2024 07:49 PM    INR 1.4 05/07/2024 07:49 PM         U/A:    Lab Results   Component Value Date/Time    COLORU YELLOW 05/07/2024 08:54 PM    PROTEINU 100 05/07/2024 08:54 PM    PHUR 6.0 05/07/2024 08:54 PM    WBCUA 4 05/07/2024 08:54 PM    RBCUA 592 05/07/2024 08:54 PM    MUCUS RARE 05/07/2024 08:54 PM    TRICHOMONAS NONE SEEN 05/07/2024 08:54 PM    YEAST FEW 05/07/2024 08:54 PM    BACTERIA RARE 05/07/2024 08:54 PM    CLARITYU CLOUDY 05/07/2024 08:54 PM    LEUKOCYTESUR NEGATIVE 05/07/2024 08:54 PM    UROBILINOGEN 0.2 05/07/2024 08:54 PM    BILIRUBINUR NEGATIVE 05/07/2024 08:54 PM    BLOODU LARGE NUMBER OR AMOUNT OBSERVED 05/07/2024 08:54 PM    GLUCOSEU NEGATIVE 05/07/2024 08:54 PM         Assessment & Plan:      Eliazar Ugarte is a 83 y.o. male admitted 5/7/2024 for AMS workup.  Was scheduled for release today but had a catheter placed overnight for urinary retention    1) urinary retention: chronically

## 2024-05-12 NOTE — PROGRESS NOTES
PHARMACY VANCOMYCIN MONITORING SERVICE  Pharmacy consulted by Dr. Solomon for monitoring and adjustment.    Indication for treatment: Vancomycin indication: CAP with risk factors  Goal trough: Trough Goal: 15-20 mcg/mL  AUC/KEMAL: 400-600    Risk Factors for MRSA Identified:   Documented isolation of MRSA within 1 year     Pertinent Laboratory Values:   Temp Readings from Last 3 Encounters:   05/12/24 97.4 °F (36.3 °C) (Axillary)   02/02/23 97.5 °F (36.4 °C) (Oral)     Recent Labs     05/10/24  0111 05/11/24  0213 05/12/24  0154   WBC 8.3 6.3 7.2       Recent Labs     05/10/24  0111 05/11/24  0213 05/12/24  0154   BUN 21 17 14   CREATININE 1.2 1.1 1.2       Estimated Creatinine Clearance: 58 mL/min (based on SCr of 1.2 mg/dL).    Intake/Output Summary (Last 24 hours) at 5/12/2024 1540  Last data filed at 5/12/2024 0834  Gross per 24 hour   Intake 300 ml   Output 1500 ml   Net -1200 ml       Last Encounter Weight:  Wt Readings from Last 3 Encounters:   05/11/24 100.3 kg (221 lb 1.9 oz)   02/01/23 88.8 kg (195 lb 12.8 oz)       Pertinent Cultures:   Date    Source    Results  05/07/24  Blood    No growth at 72 hours  05/08/24  MRSA Nasal   Positive    Vancomycin level:   TROUGH:  No results for input(s): \"VANCOTROUGH\" in the last 72 hours.  RANDOM:    Recent Labs     05/11/24 0213   VANCORANDOM 15.1         Assessment:  HPI: 83 y.o. male with coronary artery disease s/p CABG, hypertension, diabetes mellitus type 2, BEBETO as per documentation, dementia, history of CVA, BPH, congestive heart failure, who was sent from the nursing home for confusion, fall, strokelike symptoms.  Patient is currently in a deep sleep, was hard to wake him up.  Did not follow any commands.  Most of the information was on review of records.   SCr, BUN, and urine output:   Previously JOSH on CKD, Scr remains close to baseline @1.2, baseline appears to be 1.1-1.3 mg/dL  BUN within normal limits  UOP ok  Day(s) of therapy: 4 of 7  Vancomycin

## 2024-05-12 NOTE — PROGRESS NOTES
V2.0  [facility] Hospitalist Progress Note      Name:  Eliazar Ugarte /Age/Sex: 1940  (83 y.o. male)   MRN & CSN:  [id number] & [id number] Encounter Date/Time: 2024 12:25 PM EDT    Location:  [address] [place] [address]-A PCP: Roberta Schafer       [facility] Day: 6    Assessment and Plan:   Eliazar Ugarte is a 83 y.o. male  who presents with Acute encephalopathy      Plan:        #.  Pneumonia  -Chest x-ray-right upper lobe suprahilar airspace disease   -Blood cultures drawn in ED  -Procalcitonin 0.199  -IV antibiotics: Vancomycin plus cefepime   Plan is for transition to oral at discharge    #.  Strokelike symptoms  -Stroke alert called in ED  -Last well-known-5 PM  -NIHSS could not be done as patient is currently sleeping, not able to be kept awake.  -CT head-no acute intracranial abnormality  -CTA head and neck-atherosclerotic changes noted in the region of the carotid bifurcation bilaterally.  Left greater than the right with approximately 40% diameter stenosis and small focal adherent thrombus seen within the lumen of the proximal left internal carotid artery.  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  -Patient was not deemed a TNK candidate as he is on Eliquis.  -NIHSS/PT/OT/SLP evaluation ordered  -MRI brain: No acute stroke  -Continue aspirin, statin  -Consult neurology and Neuro interventional  -Angiogram 24     #.  SVT  -As per documentation, enroute to the hospital  -Received 6 mg, 12 mg of adenosine  -EKG on [id number] with RVR-.  -Consult cardiology.  -Continune Beta blocker     Urinary retention  Hematuria  Patient with urinary retention while Flomax was being held.  Wall catheter was placed.  Hematuria likely secondary to patient with Wall  Urology consulted  Voiding trial in 2 to 3 days once hematuria clears    #.  Abnormal troponin  -Troponin 93, 106  -EKG-no acute ST-T wave changes.  -Serial troponins, repeat EKG  -Consult cardiology.     #.  JOSH on CKD  -Creatinine 1.1-2024, 1.5  concern of early infarction or other subtle intracranial abnormality, MRI correlation should be considered. Because of thickening is seen in the bilateral ethmoid air cells.     No evidence of acute intracranial abnormality. Electronically signed by Shine Ignacio MD      Comment: Please note this report has been produced using speech recognition software and may contain errors related to that system including errors in grammar, punctuation, and spelling, as well as words and phrases that may be inappropriate. If there are any questions or concerns please feel free to contact the dictating provider for clarification.     Electronically signed by German Hurley MD on 5/12/2024 at 12:46 PM

## 2024-05-13 LAB
ANION GAP SERPL CALCULATED.3IONS-SCNC: 13 MMOL/L (ref 7–16)
BASOPHILS ABSOLUTE: 0.1 K/CU MM
BASOPHILS RELATIVE PERCENT: 0.9 % (ref 0–1)
BUN SERPL-MCNC: 16 MG/DL (ref 6–23)
CALCIUM SERPL-MCNC: 8.4 MG/DL (ref 8.3–10.6)
CHLORIDE BLD-SCNC: 104 MMOL/L (ref 99–110)
CO2: 24 MMOL/L (ref 21–32)
CREAT SERPL-MCNC: 1.1 MG/DL (ref 0.9–1.3)
DIFFERENTIAL TYPE: ABNORMAL
DOSE AMOUNT: NORMAL
DOSE TIME: NORMAL
EOSINOPHILS ABSOLUTE: 0.2 K/CU MM
EOSINOPHILS RELATIVE PERCENT: 2.3 % (ref 0–3)
GFR, ESTIMATED: 67 ML/MIN/1.73M2
GLUCOSE BLD-MCNC: 121 MG/DL (ref 70–99)
GLUCOSE BLD-MCNC: 130 MG/DL (ref 70–99)
GLUCOSE BLD-MCNC: 149 MG/DL (ref 70–99)
GLUCOSE BLD-MCNC: 162 MG/DL (ref 70–99)
GLUCOSE SERPL-MCNC: 161 MG/DL (ref 70–99)
HCT VFR BLD CALC: 42.9 % (ref 42–52)
HEMOGLOBIN: 14 GM/DL (ref 13.5–18)
IMMATURE NEUTROPHIL %: 1.1 % (ref 0–0.43)
LYMPHOCYTES ABSOLUTE: 1.2 K/CU MM
LYMPHOCYTES RELATIVE PERCENT: 15.1 % (ref 24–44)
MCH RBC QN AUTO: 31.5 PG (ref 27–31)
MCHC RBC AUTO-ENTMCNC: 32.6 % (ref 32–36)
MCV RBC AUTO: 96.4 FL (ref 78–100)
MONOCYTES ABSOLUTE: 1 K/CU MM
MONOCYTES RELATIVE PERCENT: 11.7 % (ref 0–4)
NEUTROPHILS ABSOLUTE: 5.6 K/CU MM
NEUTROPHILS RELATIVE PERCENT: 68.9 % (ref 36–66)
NUCLEATED RBC %: 0 %
PDW BLD-RTO: 14.5 % (ref 11.7–14.9)
PLATELET # BLD: 197 K/CU MM (ref 140–440)
PMV BLD AUTO: 10 FL (ref 7.5–11.1)
POTASSIUM SERPL-SCNC: 3.8 MMOL/L (ref 3.5–5.1)
RBC # BLD: 4.45 M/CU MM (ref 4.6–6.2)
SODIUM BLD-SCNC: 141 MMOL/L (ref 135–145)
TOTAL IMMATURE NEUTOROPHIL: 0.09 K/CU MM
TOTAL NUCLEATED RBC: 0 K/CU MM
VANCOMYCIN RANDOM: 19.9 UG/ML
WBC # BLD: 8.2 K/CU MM (ref 4–10.5)

## 2024-05-13 PROCEDURE — 2140000000 HC CCU INTERMEDIATE R&B

## 2024-05-13 PROCEDURE — 2580000003 HC RX 258: Performed by: STUDENT IN AN ORGANIZED HEALTH CARE EDUCATION/TRAINING PROGRAM

## 2024-05-13 PROCEDURE — 97530 THERAPEUTIC ACTIVITIES: CPT

## 2024-05-13 PROCEDURE — 6370000000 HC RX 637 (ALT 250 FOR IP): Performed by: INTERNAL MEDICINE

## 2024-05-13 PROCEDURE — 94761 N-INVAS EAR/PLS OXIMETRY MLT: CPT

## 2024-05-13 PROCEDURE — 80202 ASSAY OF VANCOMYCIN: CPT

## 2024-05-13 PROCEDURE — 6360000002 HC RX W HCPCS: Performed by: STUDENT IN AN ORGANIZED HEALTH CARE EDUCATION/TRAINING PROGRAM

## 2024-05-13 PROCEDURE — 80048 BASIC METABOLIC PNL TOTAL CA: CPT

## 2024-05-13 PROCEDURE — 6370000000 HC RX 637 (ALT 250 FOR IP): Performed by: STUDENT IN AN ORGANIZED HEALTH CARE EDUCATION/TRAINING PROGRAM

## 2024-05-13 PROCEDURE — 6370000000 HC RX 637 (ALT 250 FOR IP): Performed by: NURSE PRACTITIONER

## 2024-05-13 PROCEDURE — 85025 COMPLETE CBC W/AUTO DIFF WBC: CPT

## 2024-05-13 PROCEDURE — 2580000003 HC RX 258: Performed by: NURSE PRACTITIONER

## 2024-05-13 PROCEDURE — 99231 SBSQ HOSP IP/OBS SF/LOW 25: CPT | Performed by: NURSE PRACTITIONER

## 2024-05-13 PROCEDURE — 6370000000 HC RX 637 (ALT 250 FOR IP): Performed by: FAMILY MEDICINE

## 2024-05-13 PROCEDURE — 82962 GLUCOSE BLOOD TEST: CPT

## 2024-05-13 PROCEDURE — 36415 COLL VENOUS BLD VENIPUNCTURE: CPT

## 2024-05-13 PROCEDURE — 2580000003 HC RX 258: Performed by: INTERNAL MEDICINE

## 2024-05-13 RX ORDER — QUETIAPINE FUMARATE 25 MG/1
25 TABLET, FILM COATED ORAL ONCE
Status: DISCONTINUED | OUTPATIENT
Start: 2024-05-13 | End: 2024-05-14

## 2024-05-13 RX ADMIN — DIVALPROEX SODIUM 125 MG: 125 CAPSULE, COATED PELLETS ORAL at 09:17

## 2024-05-13 RX ADMIN — VITAMIN B COMPLEX 1 CAPSULE: at 09:18

## 2024-05-13 RX ADMIN — ACETAMINOPHEN 650 MG: 325 TABLET ORAL at 01:31

## 2024-05-13 RX ADMIN — CARBOXYMETHYLCELLULOSE SODIUM 1 DROP: 10 GEL OPHTHALMIC at 21:56

## 2024-05-13 RX ADMIN — CEFEPIME 2000 MG: 2 INJECTION, POWDER, FOR SOLUTION INTRAVENOUS at 16:56

## 2024-05-13 RX ADMIN — CEFEPIME 2000 MG: 2 INJECTION, POWDER, FOR SOLUTION INTRAVENOUS at 01:22

## 2024-05-13 RX ADMIN — MICONAZOLE NITRATE: 2 POWDER TOPICAL at 22:05

## 2024-05-13 RX ADMIN — MIDODRINE HYDROCHLORIDE 5 MG: 5 TABLET ORAL at 16:58

## 2024-05-13 RX ADMIN — SODIUM CHLORIDE, PRESERVATIVE FREE 10 ML: 5 INJECTION INTRAVENOUS at 09:19

## 2024-05-13 RX ADMIN — PANTOPRAZOLE SODIUM 40 MG: 40 TABLET, DELAYED RELEASE ORAL at 07:31

## 2024-05-13 RX ADMIN — VANCOMYCIN HYDROCHLORIDE 1500 MG: 1.5 INJECTION, POWDER, LYOPHILIZED, FOR SOLUTION INTRAVENOUS at 13:42

## 2024-05-13 RX ADMIN — MIDODRINE HYDROCHLORIDE 5 MG: 5 TABLET ORAL at 09:18

## 2024-05-13 RX ADMIN — Medication 6 MG: at 01:31

## 2024-05-13 RX ADMIN — DIVALPROEX SODIUM 500 MG: 125 CAPSULE, COATED PELLETS ORAL at 21:39

## 2024-05-13 RX ADMIN — CARBOXYMETHYLCELLULOSE SODIUM 1 DROP: 10 GEL OPHTHALMIC at 13:42

## 2024-05-13 RX ADMIN — MUPIROCIN: 20 OINTMENT TOPICAL at 21:37

## 2024-05-13 RX ADMIN — Medication 5000 UNITS: at 09:17

## 2024-05-13 RX ADMIN — MUPIROCIN: 20 OINTMENT TOPICAL at 09:20

## 2024-05-13 RX ADMIN — DOCUSATE SODIUM 100 MG: 100 CAPSULE, LIQUID FILLED ORAL at 09:18

## 2024-05-13 RX ADMIN — POTASSIUM BICARBONATE 20 MEQ: 782 TABLET, EFFERVESCENT ORAL at 09:17

## 2024-05-13 RX ADMIN — CARBOXYMETHYLCELLULOSE SODIUM 1 DROP: 10 GEL OPHTHALMIC at 09:20

## 2024-05-13 RX ADMIN — MICONAZOLE NITRATE: 2 POWDER TOPICAL at 09:20

## 2024-05-13 RX ADMIN — TAMSULOSIN HYDROCHLORIDE 0.4 MG: 0.4 CAPSULE ORAL at 21:39

## 2024-05-13 RX ADMIN — SODIUM CHLORIDE, PRESERVATIVE FREE 10 ML: 5 INJECTION INTRAVENOUS at 21:40

## 2024-05-13 RX ADMIN — POLYETHYLENE GLYCOL (3350) 17 G: 17 POWDER, FOR SOLUTION ORAL at 09:17

## 2024-05-13 RX ADMIN — METOPROLOL TARTRATE 25 MG: 25 TABLET, FILM COATED ORAL at 21:37

## 2024-05-13 RX ADMIN — MIDODRINE HYDROCHLORIDE 5 MG: 5 TABLET ORAL at 12:04

## 2024-05-13 RX ADMIN — CEFEPIME 2000 MG: 2 INJECTION, POWDER, FOR SOLUTION INTRAVENOUS at 09:24

## 2024-05-13 RX ADMIN — ACETAMINOPHEN 650 MG: 325 TABLET ORAL at 22:02

## 2024-05-13 RX ADMIN — FUROSEMIDE 20 MG: 20 TABLET ORAL at 09:18

## 2024-05-13 RX ADMIN — METOPROLOL TARTRATE 25 MG: 25 TABLET, FILM COATED ORAL at 09:18

## 2024-05-13 RX ADMIN — ATORVASTATIN CALCIUM 80 MG: 40 TABLET, FILM COATED ORAL at 21:37

## 2024-05-13 ASSESSMENT — PAIN SCALES - GENERAL
PAINLEVEL_OUTOF10: 3
PAINLEVEL_OUTOF10: 0
PAINLEVEL_OUTOF10: 0

## 2024-05-13 NOTE — PROGRESS NOTES
Physical Therapy Treatment Note  Name: Eliazar Ugarte MRN: [id number] :   1940   Date:  2024   Admission Date: 2024 Room:  [address] [place] [address]   Restrictions/Precautions: General, fall risk, Contact (MRSA), 1:1 obvspinky   Communication with other providers:  Pt okay to see for therapy per RN   Subjective:  Patient states:  \"Help me, where am I?\"   Pain:   Location, Type, Intensity (0/10 to 10/10):  Does not c/o pn possibly dt cognitive deficits at this time.   Objective:    Observation:  Pt supine in bed upon PTA arrival.   Objective Measures:  Tele, stable  Treatment, including education/measures:    Therapeutic Activity Training:   Therapeutic activity training was instructed today.  Cues were given for safety, sequence, UE/LE placement, awareness, and balance.  Activities performed today included bed mobility training, sup-sit, sit-stand, SPT.    Mobility:  Sup <> sit: Mod A to EOB at trunk and Les. Mod A x 2 to return to supine.   Scooting: Mod A at hips to scoot to establish appropriate SERINA. Pt is able to scoot toward HOB with CGA with good buttocks clearance.   STS: Min-Mod A from EOB x 6 reps for pericare, strength, and endurance.   SPT: NT this date.   Rolling: To L and to R with Mod A and Mod A to maintain for pericare and change of depends and chux.     Gait:  Pt performed  x5 sidestepping to R toward HOB for improved positioning in bed. RW and CGA for safety, max cues for navigation dt cognition. Pt tolerated well with no c/o pn and fair stability overall.     Safety:   Pt returned safely to bed with bed alarm activated, call light in reach, all needs met.   Assessment / Impression:    Pt presents with increased confusion this session limiting therapy progression. Pt was able to participate in EOB mobility well but with confusion and mild agitation.   Patient's tolerance of treatment:  Fair   Adverse Reaction: Confusion  Significant change in status and impact:  none  Barriers to

## 2024-05-13 NOTE — PROGRESS NOTES
Occupational Therapy    Occupational Therapy Treatment Note    Name: Eliazar Ugarte MRN: [id number] :   1940   Date:  2024   Admission Date: 2024 Room:  [address] [place] [address]-A     Primary Problem:  Acute encephalopathy     Restrictions/Precautions:  General, fall risk, Contact (MRSA), 1:1 obvspinky     Communication with other providers: RN, sitter     Subjective:  Patient states: \"Just a short walk\"  Pain: Denied    Objective:    Observation: Pt semi fowlers in bed, agreeable to therapy.  Objective Measures:  On room air, tele, vitals remained stable    Treatment, including education:  Therapeutic Activity Training:   Therapeutic activity training was instructed today.  Cues were given for safety, sequence, UE/LE placement, awareness, and balance.    Activities performed today included bed mobility training, sup-sit, sit-stand, functional mobility.    Pt received semi fowlers in bed, agreeable to therapy. Pt provided w/ re-education on the importance of therapy and updated on current plan of care. Pt's sitter donned socks for pt while semi fowlers in bed. Pt able to initiate washing face w/ wet cloth w/ CGA, however daughter stepped in to complete task for pt. Pt performed sup to sit w/ Sumaya via pulling on therapist to bring torso upright. Pt sat EOB for approx 3 minutes w/ fair+ sitting balance. Pt performed sit to stand from EOB to RW w/ Sumaya and increased time. Pt then performed approx 30ft functional mobility w/ RW, increased time, and verbal cues for safety. Pt returned to room and sat to EOB CGA. Pt required Sumaya to advance BLE into bed. Pt positioned in bed for comfort w/ all needs met, call light within reach, sitter present.     Assessment / Impression:    Patient's tolerance of treatment: Well  Adverse Reaction: None  Significant change in status and assessment: Improved from initial evaluation  Barriers to improvement: None noted    Plan for Next Session:    Cont per OT POC     Time in:   1015  Time out:  1042  Timed treatment minutes:  27  Total treatment time:  27 minutes     Electronically signed by:    Gisselle MCKENZIE/ROLANDO [id number]  5/13/2024     12:11 PM

## 2024-05-13 NOTE — PROGRESS NOTES
05/13/24 1545   Encounter Summary   Encounter Overview/Reason Initial Encounter   Service Provided For Patient   Referral/Consult From [facility]   Support System Children   Last Encounter  05/13/24  (Partient was in good spirit during the visit.)   Complexity of Encounter Low   Begin Time 1435   End Time  1455   Total Time Calculated 20 min   Spiritual/Emotional needs   Type Spiritual Support   Grief, Loss, and Adjustments   Type Adjustment to illness   Assessment/Intervention/Outcome   Assessment Calm;Coping;Hopeful;Powerlessness   Intervention Active listening;Empowerment;Sustaining Presence/Ministry of presence   Outcome Coping   Plan and Referrals   Plan/Referrals Continue Support (comment)  (as needed)

## 2024-05-13 NOTE — PROGRESS NOTES
Vascular/Interventional Neurology Progress Note  [facility]  Patient Name: Eliazar Ugarte     : 1940      Subjective:     The patient was seen and examined.  Chart reviewed.  Patient is resting in bed.  Sitter present at bedside.  Able to follow simple commands.        Objective:   Scheduled Meds:   tamsulosin  0.4 mg Oral Nightly    cefepime  2,000 mg IntraVENous Q8H    [Held by provider] apixaban  5 mg Oral BID    furosemide  20 mg Oral Daily    mupirocin   Topical BID    sodium chloride flush  5-40 mL IntraVENous 2 times per day    vancomycin  1,500 mg IntraVENous Q24H    metoprolol tartrate  25 mg Oral BID    midodrine  5 mg Oral TID WC    sodium chloride flush  5-40 mL IntraVENous 2 times per day    [Held by provider] aspirin  81 mg Oral Daily    Or    [Held by provider] aspirin  300 mg Rectal Daily    atorvastatin  80 mg Oral Nightly    carboxymethylcellulose  1 drop Both Eyes TID    Vitamin D  5,000 Units Oral Daily    divalproex  125 mg Oral Daily    divalproex  500 mg Oral Nightly    docusate sodium  100 mg Oral Daily    b complex vitamins  1 capsule Oral Daily with breakfast    pantoprazole  40 mg Oral QAM AC    polyethylene glycol  17 g Oral Daily    potassium bicarb-citric acid  20 mEq Oral Daily    insulin lispro  0-8 Units SubCUTAneous TID WC    insulin lispro  0-4 Units SubCUTAneous Nightly    miconazole   Topical BID     Continuous Infusions:   sodium chloride 25 mL (24 1440)    dextrose       PRN Meds:.sodium chloride flush, acetaminophen, ondansetron **OR** ondansetron, sodium chloride flush, sodium chloride, polyethylene glycol, melatonin, glucose, dextrose bolus **OR** dextrose bolus, glucagon (rDNA), dextrose, prochlorperazine **OR** prochlorperazine    Vital Signs:  [unfilled]     General: A&O to self and location, disoriented to year NAD, cooperative  HEENT: NC/AT, EOMI, PERRL, mmm, neck supple  Extremities: no edema, no calf tenderness b/l    Neurological

## 2024-05-13 NOTE — PROGRESS NOTES
V2.0  [facility] Hospitalist Progress Note      Name:  Eliazar Ugarte /Age/Sex: 1940  (83 y.o. male)   MRN & CSN:  [id number] & [id number] Encounter Date/Time: 2024 12:25 PM EDT    Location:  [address] [place] [address]-A PCP: Roberta Schafer       [facility] Day: 7    Assessment and Plan:   Eliazar Ugarte is a 83 y.o. male  who presents with Acute encephalopathy      Plan:        #.  Pneumonia  -Chest x-ray-right upper lobe suprahilar airspace disease   -Blood cultures drawn in ED  -Procalcitonin 0.199  -IV antibiotics: Vancomycin plus cefepime   Plan is for transition to oral at discharge    #.  Strokelike symptoms  -Stroke alert called in ED  -Last well-known-5 PM  -NIHSS could not be done as patient is currently sleeping, not able to be kept awake.  -CT head-no acute intracranial abnormality  -CTA head and neck-atherosclerotic changes noted in the region of the carotid bifurcation bilaterally.  Left greater than the right with approximately 40% diameter stenosis and small focal adherent thrombus seen within the lumen of the proximal left internal carotid artery.  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  -Patient was not deemed a TNK candidate as he is on Eliquis.  -NIHSS/PT/OT/SLP evaluation ordered  -MRI brain: No acute stroke  -Continue aspirin, statin  -Consult neurology and Neuro interventional  -Angiogram 24     #.  SVT  -As per documentation, enroute to the hospital  -Received 6 mg, 12 mg of adenosine  -EKG on etwlrrb-H-usc with RVR-.  -Consult cardiology.  -Continune Beta blocker     Urinary retention  Hematuria  Patient with urinary retention while Flomax was being held.  Wall catheter was placed.  Hematuria likely secondary to patient with Wall  Urology consulted  Voiding trial in 2 to 3 days once hematuria clears  Patient would be very high risk to discharge with Wall.  Sitter at bedside to ensure he does not pull on Wall    #.  Abnormal troponin  -Troponin 93, 106  -EKG-no acute ST-T wave  region of the carotid bifurcation on the right with mild, approximately 25% diameter narrowing of the proximal right internal carotid artery. LEFT SUBCLAVIAN ARTERY: Normal. LEFT VERTEBRAL ARTERY: Left vertebral artery appears patent and appears to give rise to the basilar artery. Atherosclerotic calcification is seen in the distal left vertebral with atherosclerotic calcification and mild narrowing at the origin. LEFT CAROTID ARTERY: Moderately advanced atherosclerotic calcification is seen in the region of the carotid bifurcation on the left in the proximal left internal carotid artery. There is small adherent thrombus seen within the proximal left internal carotid artery with approximately 40% diameter stenosis. NECK SOFT TISSUES: No neck soft tissue mass or lymphadenopathy. VISUALIZED MEDIASTINUM: No mass or lymphadenopathy. VISUALIZED LUNG APICES: Clear. BONES: No destructive osseous process. OTHER: None.     Atherosclerotic changes seen in the region of the carotid bifurcation bilaterally, left greater the right with approximately 40% diameter stenosis and small focal adherent thrombus seen within the lumen of the proximal left internal carotid artery. 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 Dominant left vertebral artery gives rise to the basilar artery and shows atherosclerotic calcifications both distally and at its origin. PROCEDURE: CT ANGIOGRAPHY HEAD WITH/WITHOUT CONTRAST INDICATION: ams facial droop COMPARISON: none TECHNIQUE: Axial CT imaging obtained through the head prior to and following administration of IV contrast. Axial images, multiplanar reformatted images, and maximum intensity projection images were reviewed for CT angiographic technique. IV contrast: 75 mL Isovue-370. FINDINGS: ANTERIOR CIRCULATION: The intracranial internal carotid arteries, anterior cerebral arteries, and middle cerebral arteries demonstrate no occlusion or stenosis. No evidence for aneurysm or arteriovenous  malformation. POSTERIOR CIRCULATION: The distal left vertebral artery, basilar artery and posterior cerebral arteries demonstrate no occlusion or stenosis. No evidence for aneurysm or arteriovenous malformation. INTRACRANIAL VENOUS SYSTEM: There is no significant enhancement seen of the dural venous sinuses. This may be due to early phase of contrast but thrombus is not excluded. Refer to recent noncontrast CT of the head for additional results.. IMPRESSION: No significant intracranial arterial vascular pathology is identified. There is nonenhancement of the dural saphenous sinuses which may be due to early phase of contrast opacification but the possibility of thrombosis cannot be excluded on the basis of this exam. Electronically signed by Shine Ignacio MD    CT CERVICAL SPINE WO CONTRAST    Result Date: 5/7/2024  EXAM: CT CERVICAL SPINE WO CONTRAST INDICATION: Fall COMPARISON: None Technical factors: Non-contrast CT imaging was performed of the cervical spine. Axial and multiplanar reformatted images reviewed.   Individualized dose optimization technique was used in order to meet ALARA standards for radiation dose reduction.  In addition to vendor specific dose reduction algorithms, the dose reduction techniques vary based on the specific scanner utilized but frequently include automated exposure control, adjustment of the mA and/or kV according to patient size, and use of iterative reconstruction technique. Contrast: None FINDINGS: No evidence of acute fracture or traumatic abnormality is seen. Multilevel spondylotic changes seen with uncovertebral spurring at multiple levels noted bilaterally throughout the cervical spine. If concern discogenic disease, MRI correlation would be recommended.     No fracture seen. Electronically signed by Shine Ignacio MD    CT HEAD WO CONTRAST    Result Date: 5/7/2024  CT HEAD WITHOUT CONTRAST HISTORY: Stroke COMPARISON: 1/27/2023 FINDINGS: Age-related atrophy is seen with

## 2024-05-13 NOTE — PROGRESS NOTES
05/13/24 1545   Encounter Summary   Encounter Overview/Reason Initial Encounter   Service Provided For Patient   Referral/Consult From [facility]   Support System Children   Last Encounter  05/13/24  (Partient did not express any needs at this time)   Complexity of Encounter Low   Begin Time 1435   End Time  1405   Total Time Calculated 1410 min   Spiritual/Emotional needs   Type Spiritual Support   Grief, Loss, and Adjustments   Type Adjustment to illness   Assessment/Intervention/Outcome   Assessment Calm;Coping;Hopeful;Powerlessness   Intervention Active listening;Empowerment;Sustaining Presence/Ministry of presence   Outcome Coping   Plan and Referrals   Plan/Referrals Continue Support (comment)  (as needed)

## 2024-05-13 NOTE — PROGRESS NOTES
PHARMACY VANCOMYCIN MONITORING SERVICE  Pharmacy consulted by Dr. Solomon for monitoring and adjustment.    Indication for treatment: Vancomycin indication: CAP with risk factors  Goal trough: Trough Goal: 15-20 mcg/mL  AUC/KEMAL: 400-600    Risk Factors for MRSA Identified:   Documented isolation of MRSA within 1 year     Pertinent Laboratory Values:   Temp Readings from Last 3 Encounters:   05/13/24 98.3 °F (36.8 °C) (Oral)   02/02/23 97.5 °F (36.4 °C) (Oral)     Recent Labs     05/11/24  0213 05/12/24  0154 05/13/24  0247   WBC 6.3 7.2 8.2       Recent Labs     05/11/24  0213 05/12/24  0154 05/13/24  0247   BUN 17 14 16   CREATININE 1.1 1.2 1.1       Estimated Creatinine Clearance: 63 mL/min (based on SCr of 1.1 mg/dL).    Intake/Output Summary (Last 24 hours) at 5/13/2024 1523  Last data filed at 5/13/2024 1118  Gross per 24 hour   Intake 540 ml   Output 900 ml   Net -360 ml       Last Encounter Weight:  Wt Readings from Last 3 Encounters:   05/12/24 100 kg (220 lb 7.4 oz)   02/01/23 88.8 kg (195 lb 12.8 oz)       Pertinent Cultures:   Date    Source    Results  05/07/24  Blood    No growth at 72 hours  05/08/24  MRSA Nasal   Positive    Vancomycin level:   TROUGH:  No results for input(s): \"VANCOTROUGH\" in the last 72 hours.  RANDOM:    Recent Labs     05/11/24 0213 05/13/24  0247   VANCORANDOM 15.1 19.9         Assessment:  HPI: 83 y.o. male with coronary artery disease s/p CABG, hypertension, diabetes mellitus type 2, BEBETO as per documentation, dementia, history of CVA, BPH, congestive heart failure, who was sent from the nursing home for confusion, fall, strokelike symptoms.  Patient is currently in a deep sleep, was hard to wake him up.  Did not follow any commands.  Most of the information was on review of records.   SCr, BUN, and urine output:   Previously JOSH on CKD, Scr remains close to baseline @1.1, baseline appears to be 1.1-1.3 mg/dL  BUN within normal limits  UOP ok  Day(s) of therapy: 5 of  7  Vancomycin concentration:  5/11/24 - 15.1 mg/L ~13h post dose,  (1500mg q24h)      Plan:  CKD, renal trends remain close to baseline.  Continue vancomycin 1500 mg IV every 24 hours   mg/L*hr  No further levels unless therapy is continued past 7 days.  Will follow renal trends until vancomycin therapy is complete.  Pharmacy will continue to monitor patient and adjust therapy as indicated    Thank you for the consult.  Markus Adams RPH  5/13/2024 3:23 PM

## 2024-05-13 NOTE — PROGRESS NOTES
Report received from RNFabián.   Safety checks performed at bedside.      VSS- see flow sheet.      PIV patent with antibiotics running at ordered rates. (See MAR)     Sitter at bedside. Fall precautions maintained including bed alarm in place and purposeful rounding.     Safety and skin integtrity maintained.   Patient updated on plan of care but does not show understanding at this time.

## 2024-05-13 NOTE — CARE COORDINATION
Spoke with Luana/Pt's dgt as pt is confused. Plan was return to [facility] with HC. Dgt has decided that she would like for the pt to return SNF. She wanted to avoid SNF so his dementia would not worsen, but pt has already grown more confused while here. Agreeable to SNF. Confirmed with Dorothy/Ross they can accept. Will need precert and PAS. Updated PT notes needed for precert.

## 2024-05-13 NOTE — PROGRESS NOTES
Pt is very agitated at this time, he took his EKG leads off and will not allow us to put them back on, will keep trying as he calms down

## 2024-05-14 LAB
ANION GAP SERPL CALCULATED.3IONS-SCNC: 13 MMOL/L (ref 7–16)
BACTERIA: NEGATIVE /HPF
BASOPHILS ABSOLUTE: 0.1 K/CU MM
BASOPHILS RELATIVE PERCENT: 1 % (ref 0–1)
BILIRUBIN, URINE: NEGATIVE
BLOOD, URINE: NORMAL
BUN SERPL-MCNC: 19 MG/DL (ref 6–23)
CALCIUM SERPL-MCNC: 8.3 MG/DL (ref 8.3–10.6)
CHLORIDE BLD-SCNC: 106 MMOL/L (ref 99–110)
CLARITY: NORMAL
CO2: 21 MMOL/L (ref 21–32)
COLOR: YELLOW
CREAT SERPL-MCNC: 1.2 MG/DL (ref 0.9–1.3)
DIFFERENTIAL TYPE: ABNORMAL
EOSINOPHILS ABSOLUTE: 0.3 K/CU MM
EOSINOPHILS RELATIVE PERCENT: 3.9 % (ref 0–3)
GFR, ESTIMATED: 60 ML/MIN/1.73M2
GLUCOSE BLD-MCNC: 112 MG/DL (ref 70–99)
GLUCOSE BLD-MCNC: 120 MG/DL (ref 70–99)
GLUCOSE BLD-MCNC: 129 MG/DL (ref 70–99)
GLUCOSE BLD-MCNC: 131 MG/DL (ref 70–99)
GLUCOSE SERPL-MCNC: 130 MG/DL (ref 70–99)
GLUCOSE URINE: NEGATIVE MG/DL
HCT VFR BLD CALC: 40.8 % (ref 42–52)
HEMOGLOBIN: 13.2 GM/DL (ref 13.5–18)
HYALINE CASTS: 0 /LPF
IMMATURE NEUTROPHIL %: 1.6 % (ref 0–0.43)
KETONES, URINE: 15 MG/DL
LEUKOCYTE ESTERASE, URINE: NORMAL
LYMPHOCYTES ABSOLUTE: 1.7 K/CU MM
LYMPHOCYTES RELATIVE PERCENT: 21.6 % (ref 24–44)
MCH RBC QN AUTO: 32 PG (ref 27–31)
MCHC RBC AUTO-ENTMCNC: 32.4 % (ref 32–36)
MCV RBC AUTO: 98.8 FL (ref 78–100)
MONOCYTES ABSOLUTE: 1.1 K/CU MM
MONOCYTES RELATIVE PERCENT: 13.8 % (ref 0–4)
NEUTROPHILS ABSOLUTE: 4.6 K/CU MM
NEUTROPHILS RELATIVE PERCENT: 58.1 % (ref 36–66)
NITRITE URINE, QUANTITATIVE: NEGATIVE
NUCLEATED RBC %: 0 %
PDW BLD-RTO: 14.5 % (ref 11.7–14.9)
PH, URINE: 6.5
PLATELET # BLD: 189 K/CU MM (ref 140–440)
PMV BLD AUTO: 9.6 FL (ref 7.5–11.1)
POTASSIUM SERPL-SCNC: 4 MMOL/L (ref 3.5–5.1)
PROTEIN UA: 100 MG/DL
RBC # BLD: 4.13 M/CU MM (ref 4.6–6.2)
RBC URINE: 10 /HPF
SODIUM BLD-SCNC: 140 MMOL/L (ref 135–145)
SPECIFIC GRAVITY UA: 1.02
SQUAMOUS EPITHELIAL: <1 /HPF
TOTAL IMMATURE NEUTOROPHIL: 0.13 K/CU MM
TOTAL NUCLEATED RBC: 0 K/CU MM
TRICHOMONAS: NORMAL /HPF
UROBILINOGEN, URINE: 0.2 MG/DL
WBC # BLD: 8 K/CU MM (ref 4–10.5)
WBC UA: <1 /HPF

## 2024-05-14 PROCEDURE — 6370000000 HC RX 637 (ALT 250 FOR IP): Performed by: NURSE PRACTITIONER

## 2024-05-14 PROCEDURE — 80048 BASIC METABOLIC PNL TOTAL CA: CPT

## 2024-05-14 PROCEDURE — 81001 URINALYSIS AUTO W/SCOPE: CPT

## 2024-05-14 PROCEDURE — 92526 ORAL FUNCTION THERAPY: CPT | Performed by: SPEECH-LANGUAGE PATHOLOGIST

## 2024-05-14 PROCEDURE — 05HD33Z INSERTION OF INFUSION DEVICE INTO RIGHT CEPHALIC VEIN, PERCUTANEOUS APPROACH: ICD-10-PCS | Performed by: PSYCHIATRY & NEUROLOGY

## 2024-05-14 PROCEDURE — 2580000003 HC RX 258: Performed by: STUDENT IN AN ORGANIZED HEALTH CARE EDUCATION/TRAINING PROGRAM

## 2024-05-14 PROCEDURE — 36415 COLL VENOUS BLD VENIPUNCTURE: CPT

## 2024-05-14 PROCEDURE — 6360000002 HC RX W HCPCS: Performed by: STUDENT IN AN ORGANIZED HEALTH CARE EDUCATION/TRAINING PROGRAM

## 2024-05-14 PROCEDURE — 36410 VNPNXR 3YR/> PHY/QHP DX/THER: CPT

## 2024-05-14 PROCEDURE — 94761 N-INVAS EAR/PLS OXIMETRY MLT: CPT

## 2024-05-14 PROCEDURE — 82962 GLUCOSE BLOOD TEST: CPT

## 2024-05-14 PROCEDURE — 2580000003 HC RX 258: Performed by: NURSE PRACTITIONER

## 2024-05-14 PROCEDURE — 6370000000 HC RX 637 (ALT 250 FOR IP): Performed by: INTERNAL MEDICINE

## 2024-05-14 PROCEDURE — 6370000000 HC RX 637 (ALT 250 FOR IP): Performed by: STUDENT IN AN ORGANIZED HEALTH CARE EDUCATION/TRAINING PROGRAM

## 2024-05-14 PROCEDURE — 76937 US GUIDE VASCULAR ACCESS: CPT

## 2024-05-14 PROCEDURE — 85025 COMPLETE CBC W/AUTO DIFF WBC: CPT

## 2024-05-14 PROCEDURE — 2140000000 HC CCU INTERMEDIATE R&B

## 2024-05-14 RX ORDER — SODIUM CHLORIDE 9 MG/ML
INJECTION, SOLUTION INTRAVENOUS CONTINUOUS
Status: DISCONTINUED | OUTPATIENT
Start: 2024-05-14 | End: 2024-05-23 | Stop reason: HOSPADM

## 2024-05-14 RX ORDER — QUETIAPINE FUMARATE 25 MG/1
25 TABLET, FILM COATED ORAL ONCE
Status: DISCONTINUED | OUTPATIENT
Start: 2024-05-14 | End: 2024-05-14

## 2024-05-14 RX ORDER — QUETIAPINE FUMARATE 25 MG/1
25 TABLET, FILM COATED ORAL ONCE
Status: DISCONTINUED | OUTPATIENT
Start: 2024-05-14 | End: 2024-05-15

## 2024-05-14 RX ADMIN — MICONAZOLE NITRATE: 2 POWDER TOPICAL at 10:02

## 2024-05-14 RX ADMIN — METOPROLOL TARTRATE 25 MG: 25 TABLET, FILM COATED ORAL at 10:22

## 2024-05-14 RX ADMIN — POTASSIUM BICARBONATE 10 MEQ: 782 TABLET, EFFERVESCENT ORAL at 10:15

## 2024-05-14 RX ADMIN — PANTOPRAZOLE SODIUM 40 MG: 40 TABLET, DELAYED RELEASE ORAL at 06:43

## 2024-05-14 RX ADMIN — DIVALPROEX SODIUM 125 MG: 125 CAPSULE, COATED PELLETS ORAL at 10:14

## 2024-05-14 RX ADMIN — SODIUM CHLORIDE, PRESERVATIVE FREE 5 ML: 5 INJECTION INTRAVENOUS at 10:15

## 2024-05-14 RX ADMIN — SODIUM CHLORIDE: 9 INJECTION, SOLUTION INTRAVENOUS at 15:33

## 2024-05-14 RX ADMIN — CEFEPIME 2000 MG: 2 INJECTION, POWDER, FOR SOLUTION INTRAVENOUS at 01:57

## 2024-05-14 RX ADMIN — MIDODRINE HYDROCHLORIDE 5 MG: 5 TABLET ORAL at 06:43

## 2024-05-14 NOTE — PROGRESS NOTES
Comprehensive Nutrition Assessment    Type and Reason for Visit:  Initial, RD Nutrition Re-Screen/LOS    Nutrition Recommendations/Plan:   Begin Diabetic oral nutrition supplement TID   Please assist feed or encourage good po intakes at bedside   Document po intakes daily in I/O      Malnutrition Assessment:  Malnutrition Status:  Moderate malnutrition (05/14/24 3333)    Context:  Acute Illness     Findings of the 6 clinical characteristics of malnutrition:  Energy Intake:  50% or less of estimated energy requirements for 5 or more days  Weight Loss:  1% to 2% over 1 week     Body Fat Loss:  Unable to assess     Muscle Mass Loss:  Unable to assess    Fluid Accumulation:  Unable to assess     Strength:  Not Performed    Nutrition Assessment:    Admitted with AMS. PMHx CHF, KALLIE, MDD, Delirium, DMII, HTN, HLD. Currently on regular diet, has been progressing from dysphagia diets dos.Meals consistently below 25% x 7 days. Has 2% wt loss in 1 week. Meets criteria for acute malnutrition. Pt was agitated and confused at time of visit, spoke with RN. Pt refusing care, pills, and po intakes. Willing to drink vs. eat, will trial oral nutrition supplements. High nutrition risk.    Nutrition Related Findings:    POCT 129 Wound Type: None       Current Nutrition Intake & Therapies:    Average Meal Intake: 1-25%, 0%  Average Supplements Intake: None Ordered  ADULT DIET; Regular    Anthropometric Measures:  Height: 185.4 cm (6' 1\")  Ideal Body Weight (IBW): 184 lbs (84 kg)    Admission Body Weight: 102.4 kg (225 lb 12 oz)  Current Body Weight: 100 kg (220 lb 7.4 oz), 119.8 % IBW. Weight Source: Bed Scale  Current BMI (kg/m2): 29.1  Usual Body Weight: 88.8 kg (195 lb 12.3 oz) (2/1/23)  % Weight Change (Calculated): 12.6  Weight Adjustment For: No Adjustment                 BMI Categories: Overweight (BMI 25.0-29.9)    Estimated Daily Nutrient Needs:  Energy Requirements Based On: Formula  Weight Used for Energy Requirements:

## 2024-05-14 NOTE — CONSULTS
[facility] Nurse  Consult Note       Eliazar Ugarte  AGE: 83 y.o.   GENDER: male  : 1940  TODAY'S DATE:  2024    Subjective:     Reason for Evaluation and Assessment: skin assessment      Eliazar Ugarte is a 83 y.o. male referred by:   [] Physician  [] Nursing  [] Other:     Wound Identification:  Wound Type: traumatic and skin tear  Contributing Factors: shear force and pt picking and scratching self        PAST MEDICAL HISTORY        Diagnosis Date    Atrial fibrillation (HCC)     Cerebral artery occlusion with cerebral infarction (HCC)     Diabetes mellitus (HCC)     Hyperlipidemia     Hypertension        PAST SURGICAL HISTORY    History reviewed. No pertinent surgical history.    FAMILY HISTORY    History reviewed. No pertinent family history.    SOCIAL HISTORY    Social History     Tobacco Use    Smoking status: Former     Types: Cigarettes    Smokeless tobacco: Former   Substance Use Topics    Alcohol use: Not Currently    Drug use: Never       ALLERGIES    No Known Allergies    MEDICATIONS    No current facility-administered medications on file prior to encounter.     Current Outpatient Medications on File Prior to Encounter   Medication Sig Dispense Refill    glipiZIDE (GLUCOTROL) 5 MG tablet Take 1 tablet by mouth 2 times daily (before meals)      furosemide (LASIX) 20 MG tablet Take 1 tablet by mouth daily      potassium chloride (KLOR-CON) 20 MEQ packet Take 20 mEq by mouth daily      carboxymethylcellulose 1 % ophthalmic solution Place 1 drop into both eyes 3 times daily      Cholecalciferol (VITAMIN D3) 125 MCG (5000 UT) TABS Take by mouth      divalproex (DEPAKOTE) 125 MG DR tablet Take 1 tablet by mouth daily      divalproex (DEPAKOTE) 500 MG DR tablet Take 1 tablet by mouth at bedtime 90 tablet 3    melatonin 3 MG TABS tablet Take 2 tablets by mouth nightly as needed (for sleep) 30 tablet 0    Probiotic Product (ACIDOPHILUS PEARLS PO) Take 1 capsule by mouth daily         Drainage Description Sanguinous 05/13/24 1800   Odor None 05/13/24 1800   Number of days: 0       Response to treatment:  Well tolerated by patient.     Pain Assessment:  Severity:  none  Quality of pain: none  Wound Pain Timing/Severity: none   Premedicated: none    Plan:     Plan of Care: Wound 05/08/24 Arm Lower;Posterior;Right-Dressing/Treatment: Silicone border  Wound 05/08/24 Elbow Left;Posterior-Dressing/Treatment: Silicone border    Alert and confused. Open areas noted to arms and pt rubbing and scratching areas at this time. Left elbow and right leg covered with silicone border for protection. Coccyx intact and non reddened. Covered with silicone border for protection. Wall catheter in place. Has sitter at bedside.     Specialty Bed Required : none  [] Low Air Loss   [x] Pressure Redistribution  [] Fluid Immersion  [] Bariatric  [] Total Pressure Relief  [] Other:     Discharge Plan:  Placement for patient upon discharge: to be determined   Hospice Care: not at this time   Patient appropriate for Outpatient Wound Care Center: not at this time    Patient/Caregiver Teaching:  Level of patient/caregiver understanding able to:   Moving in bed, verbal and easily upset. No understanding of any instruction noted.       Electronically signed by Colette Peng RN, on 5/14/2024 at 12:09 PM

## 2024-05-14 NOTE — PROGRESS NOTES
[facility]  DEPARTMENT OF SPEECH/LANGUAGE PATHOLOGY  DAILY PROGRESS NOTE  Eliazar Ugarte  5/14/2024  [id number]  Hyponatremia [E87.1]  Acute kidney injury (HCC) [N17.9]  Acute encephalopathy [G93.40]  Closed head injury, initial encounter [S09.90XA]  Fall, initial encounter [W19.XXXA]  Altered mental status, unspecified altered mental status type [R41.82]  Atrial fibrillation, unspecified type (HCC) [I48.91]  Acute congestive heart failure, unspecified heart failure type (HCC) [I50.9]  No Known Allergies      Pt was seen this date for dysphagia treatment.       IMPRESSION AND RECOMMENDATIONS:   Eliazar Ugarte was seen for diet tolerance monitoring.  Pt has apparently had a decline in his mental status this admission and currently requires a sitter.  Sitter reports pt was pocketing food yesterday and today would not eat solids/spit out bolus.  Pt was received in bed asleep and became mildly agitated when awakened.  Following repositioning, pt was calm and cooperative and agreeable to PO trials.  Pt tolerated thin liquids by straw x 6 oz with normal bolus acceptance, a-p transit and clearance.  0 s/s aspiration for all trials of thins.  Pt accepted trial of soft solid x 1 with mildly prolonged mastication and functional clearance (mild liquid residue on soft palate) and 0 s/s aspiration.  Pt refused further solids.  Pharyngeal stage appears WNL with no s/s of aspiration.      Recommend:  Soft and bite-sized diet d/t report of pocketing and AMS.  Total feed with general aspiration precautions.  Will follow for diet tolerance.      GOALS (current status in bold):  Short-term Goals  Timeframe for Short-term Goals: LOS or until goals are met  Goal 1: Pt will tolerate regular solids/thin liquids with adequate oral manipulation and no s/s of aspiration 100% Not Met, Discontinue  New Goal 1:  Pt will tolerate Soft and Bite size solids/Thin liquids with functional mastication and clearance and 0 s/s

## 2024-05-14 NOTE — PROGRESS NOTES
Physician Progress Note      PATIENT:               NOLBERTO HUTCHISON  CSN #:                  [id number]  :                       1940  ADMIT DATE:       2024 7:19 PM  DISCH DATE:  RESPONDING  PROVIDER #:        EDDIE SHORT          QUERY TEXT:    Pt admitted with AMS and facial weakness. Pt noted to have dementia and PNA.   If possible, please document in progress notes and discharge summary the   etiology of AMS.      The medical record reflects the following:  Risk Factors: Dementia, PNA  Clinical Indicators: Neuro note on 5/10 \"MRI of the brain negative for   acute/subacute ischemia... Nursing note on  \"Patient is confused at this   time. He is aggressive and attempted pulling the only IV access on him, tele   wires etc.\"  Treatment: Sitter, wrist restraints, IM Zyprexa, IV Versed x 1, IV Haldol,   brain MRI, cerebral angiogram, neuro consult.    Thank you,  Sia DAVALOS, RN, [facility] [phone]  Options provided:  -- AMS due to dementia with behavioral disturbance, encephalopathy was ruled   out  -- AMS secondary to metabolic encephalopathy due to pneumonia and dementia   with behavioral disturbance  -- AMS due to, please specify.  -- Other - I will add my own diagnosis  -- Disagree - Not applicable / Not valid  -- Disagree - Clinically unable to determine / Unknown  -- Refer to Clinical Documentation Reviewer    PROVIDER RESPONSE TEXT:    This patient has AMS secondary to metabolic encephalopathy due to pneumonia   and dementia with behavioral disturbance.    Query created by: Sia Lewis on 5/10/2024 2:22 PM      QUERY TEXT:    Pt admitted with AMS and facial weakness. Pt noted to have PNA, increased WBC,   elevated LA, elevated temp. If possible, please document in the progress   notes and discharge summary if you are evaluating and /or treating any of the   following:      The medical record reflects the following:  Risk Factors: PNA  Clinical Indicators: WBC 12-8.3, LA 2.3-1.7,

## 2024-05-14 NOTE — PROGRESS NOTES
V2.0  [facility] Hospitalist Progress Note      Name:  Eliazar Ugarte /Age/Sex: 1940  (83 y.o. male)   MRN & CSN:  [id number] & [id number] Encounter Date/Time: 2024 12:25 PM EDT    Location:  [address] [place] [address]-A PCP: Roberta Schafer       [facility] Day: 8    Assessment and Plan:   Eliazar Ugarte is a 83 y.o. male  who presents with Acute encephalopathy    Patient is awaiting placement to SNF however needs to be sitter free for 24 hours to be discharged.  Patient is currently confused and is currently requiring sitter due to behavioral disturbances and danger to himself.    Plan:  #.  Pneumonia  -Chest x-ray-right upper lobe suprahilar airspace disease   -Blood cultures drawn in ED negative  -Procalcitonin 0.199  -IV antibiotics: Vancomycin plus cefepime.  Will discontinue antibiotics as there is no evidence of any infectious etiology at present that would require continued antibiotic administration  -Follow clinically    #.  Strokelike symptoms  -Stroke alert called in ED  -Last well-known-5 PM  -NIHSS could not be done as patient is currently sleeping, not able to be kept awake.  -CT head-no acute intracranial abnormality  -CTA head and neck-atherosclerotic changes noted in the region of the carotid bifurcation bilaterally.  Left greater than the right with approximately 40% diameter stenosis and small focal adherent thrombus seen within the lumen of the proximal left internal carotid artery.  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  -Patient was not deemed a TNK candidate as he is on Eliquis.  -NIHSS/PT/OT/SLP evaluation ordered  -MRI brain: No acute stroke  -Continue aspirin, statin  -Consult neurology and Neuro interventional, no indication for any acute intervention  -Angiogram 24     #.  SVT  -As per documentation, enroute to the hospital  -Received 6 mg, 12 mg of adenosine  -EKG on [id number] with RVR-.  -Consult cardiology.  -Continune Beta blocker     Urinary  the internal maxillary, superficial temporal artery, middle meningeal artery and occipital arteries.  No abnormalities were identified. Next the catheter was taken into the right internal carotid artery cerebral angiogram was done which shows contrast flowing in antegrade direction to the intracranial portion of the internal carotid artery filling the middle cerebral artery and anterior cerebral artery.  No aneurysm or significant stenosis is identified.  Capillary and venous phases are within normal limits. The catheter was taken into the left subclavian artery and then into the left vertebral artery and a cervical and cerebral angiogram was done which shows contrast flowing an antegrade direction to the cervical portion of the left vertebral artery and then into the intracranial portion of the left vertebral artery filling the basilar artery and bilateral posterior cerebral arteries.  There is a 50% stenosis at the origin of the left vertebral artery.  There are atherosclerotic changes seen in the posterior cerebral arteries but no flow-limiting stenosis.  Capillary and venous phases are unremarkable. The catheter was brought into the left common carotid artery and a biplane cervical angiogram was done which shows contrast flowing antegrade direction to the common internal and external carotid arteries.  There are atherosclerotic changes at the bifurcation producing a 20 to 30% stenosis no thrombus or flow-limiting lesion. Next a cerebral angiogram was done from that location which shows contrast flowing in antegrade direction to the intracranial portion of the internal carotid artery filling the middle cerebral artery and anterior cerebral artery.  Mild atherosclerotic changes are identified in the intracranial internal carotid artery without any significant stenosis.  Capillary and venous phases are within normal limits. Next the catheter was removed from the body.  A right common femoral artery angiogram was  malformation. POSTERIOR CIRCULATION: The distal left vertebral artery, basilar artery and posterior cerebral arteries demonstrate no occlusion or stenosis. No evidence for aneurysm or arteriovenous malformation. INTRACRANIAL VENOUS SYSTEM: There is no significant enhancement seen of the dural venous sinuses. This may be due to early phase of contrast but thrombus is not excluded. Refer to recent noncontrast CT of the head for additional results.. IMPRESSION: No significant intracranial arterial vascular pathology is identified. There is nonenhancement of the dural saphenous sinuses which may be due to early phase of contrast opacification but the possibility of thrombosis cannot be excluded on the basis of this exam. Electronically signed by Shine Ignacio MD    CT CERVICAL SPINE WO CONTRAST    Result Date: 5/7/2024  EXAM: CT CERVICAL SPINE WO CONTRAST INDICATION: Fall COMPARISON: None Technical factors: Non-contrast CT imaging was performed of the cervical spine. Axial and multiplanar reformatted images reviewed.   Individualized dose optimization technique was used in order to meet ALARA standards for radiation dose reduction.  In addition to vendor specific dose reduction algorithms, the dose reduction techniques vary based on the specific scanner utilized but frequently include automated exposure control, adjustment of the mA and/or kV according to patient size, and use of iterative reconstruction technique. Contrast: None FINDINGS: No evidence of acute fracture or traumatic abnormality is seen. Multilevel spondylotic changes seen with uncovertebral spurring at multiple levels noted bilaterally throughout the cervical spine. If concern discogenic disease, MRI correlation would be recommended.     No fracture seen. Electronically signed by Shine Ignacio MD    CT HEAD WO CONTRAST    Result Date: 5/7/2024  CT HEAD WITHOUT CONTRAST HISTORY: Stroke COMPARISON: 1/27/2023 FINDINGS: Age-related atrophy is seen with

## 2024-05-14 NOTE — PLAN OF CARE
Problem: Safety - Adult  Goal: Free from fall injury  Outcome: Progressing     Problem: Discharge Planning  Goal: Discharge to home or other facility with appropriate resources  Outcome: Progressing     Problem: Pain  Goal: Verbalizes/displays adequate comfort level or baseline comfort level  Outcome: Progressing  Flowsheets (Taken 5/13/2024 2000)  Verbalizes/displays adequate comfort level or baseline comfort level:   Encourage patient to monitor pain and request assistance   Implement non-pharmacological measures as appropriate and evaluate response   Consider cultural and social influences on pain and pain management     Problem: Skin/Tissue Integrity  Goal: Absence of new skin breakdown  Description: 1.  Monitor for areas of redness and/or skin breakdown  2.  Assess vascular access sites hourly  3.  Every 4-6 hours minimum:  Change oxygen saturation probe site  4.  Every 4-6 hours:  If on nasal continuous positive airway pressure, respiratory therapy assess nares and determine need for appliance change or resting period.  Outcome: Progressing     Problem: ABCDS Injury Assessment  Goal: Absence of physical injury  Outcome: Progressing     Problem: Chronic Conditions and Co-morbidities  Goal: Patient's chronic conditions and co-morbidity symptoms are monitored and maintained or improved  Outcome: Progressing     Problem: Safety - Medical Restraint  Goal: Remains free of injury from restraints (Restraint for Interference with Medical Device)  Description: INTERVENTIONS:  1. Determine that other, less restrictive measures have been tried or would not be effective before applying the restraint  2. Evaluate the patient's condition at the time of restraint application  3. Inform patient/family regarding the reason for restraint  4. Q2H: Monitor safety, psychosocial status, comfort, nutrition and hydration  Outcome: Progressing     Problem: Safety - Violent/Self-destructive Restraint  Goal: Remains free of injury from  restraints (Restraint for Violent/Self-Destructive Behavior)  Description: INTERVENTIONS:  1. Determine that de-escalation and other, less restrictive measures have been tried or would not be effective before applying the restraint  2. Identify and document the criteria for restraint  3. Evaluate the patient's condition at the time of restraint application  4. Inform patient/family regarding the reason for restraint/seclusion  5. Q2H: Monitor comfort, nutrition and hydration needs  6. Q15M: Perform safety checks including skin, circulation, sensory, respiratory and psychological status  7. Ensure continuous observation  8. Identify and implement measures to help patient regain control, assess readiness for release and initiate progressive release per policy  Outcome: Progressing

## 2024-05-14 NOTE — CONSULTS
Consult completed. Indication for line: poor venous access with limited sites for access r/t pt pulling out IV's - in the past 7d pt has pulled out 7x PIV's & 1x EDPIV. #20ga Introcan Safety Deep Access Extended Dwell Catheter initiated to RUE Cephalic Vein using sterile, UltraSound-guided technique made difficult by pt agitation/hostility with pt scratching, grabbing, and attempting to punch/kick staff during procedure. Pt was already hostile/agitated and trying to swing at staff prior to initiating procedure and temporary soft wrist restraint applied to RUE only during procedure & sitter + RN @bedside trying to calm pt and preventing him from injuring me during line placement. Positioning verified via UltraSound visualization of catheter within vessel lumen; site returns blood briskly and flushes without resistance/abnormalities. Sterile dressing with SkinPrep, StatLock Securing Device, SecurePortIV, CHG gel DSSG, SwabCap, and protective sleeve applied. RN remains @bedside re-initiating IV fluids and sitter remains @bedside to monitor pt safety. No other c/o or needs noted or reported.

## 2024-05-14 NOTE — CARE COORDINATION
Chart reviewed.  Noted that pt is from [facility] and that daughter wants pt to go skilled at [facility]. Dorothy/Ross is aware.  Pt has dementia and has a sitter.  Pt will require a pre-cert and will need to be sitter free 24 hrs prior to d/c.  D/c instructions are on front of packet located with the soft chart.   TE    DO NOT SEND PT TO SNF UNTIL A PRE-CERT IS OBTAINED AND HE IS SITTER FREE 24 HRS PRIOR TO D/C.   TO NOTIFY CM WHEN TO START THE PRE-CERT.  TE

## 2024-05-15 LAB
ANION GAP SERPL CALCULATED.3IONS-SCNC: 14 MMOL/L (ref 7–16)
BASOPHILS ABSOLUTE: 0.1 K/CU MM
BASOPHILS RELATIVE PERCENT: 1.5 % (ref 0–1)
BUN SERPL-MCNC: 16 MG/DL (ref 6–23)
CALCIUM SERPL-MCNC: 8.3 MG/DL (ref 8.3–10.6)
CHLORIDE BLD-SCNC: 106 MMOL/L (ref 99–110)
CO2: 19 MMOL/L (ref 21–32)
CREAT SERPL-MCNC: 1.1 MG/DL (ref 0.9–1.3)
DIFFERENTIAL TYPE: ABNORMAL
DOSE AMOUNT: ABNORMAL
DOSE TIME: ABNORMAL
EKG ATRIAL RATE: 90 BPM
EKG DIAGNOSIS: NORMAL
EKG Q-T INTERVAL: 462 MS
EKG QRS DURATION: 156 MS
EKG QTC CALCULATION (BAZETT): 565 MS
EKG R AXIS: -51 DEGREES
EKG T AXIS: 9 DEGREES
EKG VENTRICULAR RATE: 90 BPM
EOSINOPHILS ABSOLUTE: 0.3 K/CU MM
EOSINOPHILS RELATIVE PERCENT: 3.3 % (ref 0–3)
GFR, ESTIMATED: 67 ML/MIN/1.73M2
GLUCOSE BLD-MCNC: 100 MG/DL (ref 70–99)
GLUCOSE BLD-MCNC: 110 MG/DL (ref 70–99)
GLUCOSE BLD-MCNC: 140 MG/DL (ref 70–99)
GLUCOSE SERPL-MCNC: 107 MG/DL (ref 70–99)
HCT VFR BLD CALC: 49.8 % (ref 42–52)
HEMOGLOBIN: 15 GM/DL (ref 13.5–18)
IMMATURE NEUTROPHIL %: 1.8 % (ref 0–0.43)
LYMPHOCYTES ABSOLUTE: 1.2 K/CU MM
LYMPHOCYTES RELATIVE PERCENT: 13.8 % (ref 24–44)
MCH RBC QN AUTO: 32 PG (ref 27–31)
MCHC RBC AUTO-ENTMCNC: 30.1 % (ref 32–36)
MCV RBC AUTO: 106.2 FL (ref 78–100)
MONOCYTES ABSOLUTE: 1.3 K/CU MM
MONOCYTES RELATIVE PERCENT: 15.1 % (ref 0–4)
NEUTROPHILS ABSOLUTE: 5.4 K/CU MM
NEUTROPHILS RELATIVE PERCENT: 64.5 % (ref 36–66)
NUCLEATED RBC %: 0 %
PDW BLD-RTO: 14.5 % (ref 11.7–14.9)
PLATELET # BLD: 197 K/CU MM (ref 140–440)
PMV BLD AUTO: 9.5 FL (ref 7.5–11.1)
POTASSIUM SERPL-SCNC: 4.1 MMOL/L (ref 3.5–5.1)
RBC # BLD: 4.69 M/CU MM (ref 4.6–6.2)
SODIUM BLD-SCNC: 139 MMOL/L (ref 135–145)
TOTAL IMMATURE NEUTOROPHIL: 0.15 K/CU MM
TOTAL NUCLEATED RBC: 0 K/CU MM
VALPROIC ACID LEVEL: 15 UG/ML (ref 50–100)
WBC # BLD: 8.4 K/CU MM (ref 4–10.5)

## 2024-05-15 PROCEDURE — 82962 GLUCOSE BLOOD TEST: CPT

## 2024-05-15 PROCEDURE — 6370000000 HC RX 637 (ALT 250 FOR IP): Performed by: INTERNAL MEDICINE

## 2024-05-15 PROCEDURE — 6360000002 HC RX W HCPCS: Performed by: FAMILY MEDICINE

## 2024-05-15 PROCEDURE — 2580000003 HC RX 258: Performed by: STUDENT IN AN ORGANIZED HEALTH CARE EDUCATION/TRAINING PROGRAM

## 2024-05-15 PROCEDURE — 6370000000 HC RX 637 (ALT 250 FOR IP): Performed by: NURSE PRACTITIONER

## 2024-05-15 PROCEDURE — 36415 COLL VENOUS BLD VENIPUNCTURE: CPT

## 2024-05-15 PROCEDURE — 6370000000 HC RX 637 (ALT 250 FOR IP): Performed by: STUDENT IN AN ORGANIZED HEALTH CARE EDUCATION/TRAINING PROGRAM

## 2024-05-15 PROCEDURE — 93005 ELECTROCARDIOGRAM TRACING: CPT | Performed by: NURSE PRACTITIONER

## 2024-05-15 PROCEDURE — 85025 COMPLETE CBC W/AUTO DIFF WBC: CPT

## 2024-05-15 PROCEDURE — 2140000000 HC CCU INTERMEDIATE R&B

## 2024-05-15 PROCEDURE — 80048 BASIC METABOLIC PNL TOTAL CA: CPT

## 2024-05-15 PROCEDURE — 80164 ASSAY DIPROPYLACETIC ACD TOT: CPT

## 2024-05-15 PROCEDURE — 99221 1ST HOSP IP/OBS SF/LOW 40: CPT | Performed by: NURSE PRACTITIONER

## 2024-05-15 PROCEDURE — 93010 ELECTROCARDIOGRAM REPORT: CPT | Performed by: INTERNAL MEDICINE

## 2024-05-15 PROCEDURE — 97530 THERAPEUTIC ACTIVITIES: CPT

## 2024-05-15 PROCEDURE — 97116 GAIT TRAINING THERAPY: CPT

## 2024-05-15 RX ORDER — HYDROXYZINE HYDROCHLORIDE 50 MG/ML
50 INJECTION, SOLUTION INTRAMUSCULAR ONCE
Status: COMPLETED | OUTPATIENT
Start: 2024-05-15 | End: 2024-05-15

## 2024-05-15 RX ORDER — DIPHENHYDRAMINE HCL 25 MG
25 TABLET ORAL EVERY 8 HOURS PRN
Status: COMPLETED | OUTPATIENT
Start: 2024-05-15 | End: 2024-05-17

## 2024-05-15 RX ORDER — MINERAL OIL/HYDROPHIL PETROLAT
OINTMENT (GRAM) TOPICAL 2 TIMES DAILY PRN
Status: DISCONTINUED | OUTPATIENT
Start: 2024-05-15 | End: 2024-05-15 | Stop reason: CLARIF

## 2024-05-15 RX ORDER — DIVALPROEX SODIUM 125 MG/1
125 CAPSULE, COATED PELLETS ORAL DAILY
Status: DISCONTINUED | OUTPATIENT
Start: 2024-05-15 | End: 2024-05-20

## 2024-05-15 RX ORDER — DIVALPROEX SODIUM 125 MG/1
500 CAPSULE, COATED PELLETS ORAL NIGHTLY
Status: DISCONTINUED | OUTPATIENT
Start: 2024-05-15 | End: 2024-05-23 | Stop reason: HOSPADM

## 2024-05-15 RX ORDER — HALOPERIDOL 5 MG/ML
2 INJECTION INTRAMUSCULAR
Status: DISCONTINUED | OUTPATIENT
Start: 2024-05-15 | End: 2024-05-15

## 2024-05-15 RX ADMIN — METOPROLOL TARTRATE 25 MG: 25 TABLET, FILM COATED ORAL at 20:46

## 2024-05-15 RX ADMIN — MICONAZOLE NITRATE: 2 POWDER TOPICAL at 20:49

## 2024-05-15 RX ADMIN — HYDROXYZINE HYDROCHLORIDE 50 MG: 50 INJECTION, SOLUTION INTRAMUSCULAR at 01:46

## 2024-05-15 RX ADMIN — POTASSIUM BICARBONATE 20 MEQ: 782 TABLET, EFFERVESCENT ORAL at 10:48

## 2024-05-15 RX ADMIN — Medication 6 MG: at 20:46

## 2024-05-15 RX ADMIN — Medication 5000 UNITS: at 10:48

## 2024-05-15 RX ADMIN — METOPROLOL TARTRATE 25 MG: 25 TABLET, FILM COATED ORAL at 10:48

## 2024-05-15 RX ADMIN — DIVALPROEX SODIUM 125 MG: 125 CAPSULE, COATED PELLETS ORAL at 11:19

## 2024-05-15 RX ADMIN — TAMSULOSIN HYDROCHLORIDE 0.4 MG: 0.4 CAPSULE ORAL at 20:46

## 2024-05-15 RX ADMIN — DIVALPROEX SODIUM 500 MG: 125 CAPSULE, COATED PELLETS ORAL at 20:42

## 2024-05-15 RX ADMIN — MIDODRINE HYDROCHLORIDE 5 MG: 5 TABLET ORAL at 10:48

## 2024-05-15 RX ADMIN — SODIUM CHLORIDE: 9 INJECTION, SOLUTION INTRAVENOUS at 02:40

## 2024-05-15 RX ADMIN — FUROSEMIDE 20 MG: 20 TABLET ORAL at 10:48

## 2024-05-15 RX ADMIN — DIPHENHYDRAMINE HYDROCHLORIDE 25 MG: 25 TABLET ORAL at 16:52

## 2024-05-15 RX ADMIN — SODIUM CHLORIDE: 9 INJECTION, SOLUTION INTRAVENOUS at 11:20

## 2024-05-15 RX ADMIN — ATORVASTATIN CALCIUM 80 MG: 40 TABLET, FILM COATED ORAL at 20:47

## 2024-05-15 RX ADMIN — CARBOXYMETHYLCELLULOSE SODIUM 1 DROP: 10 GEL OPHTHALMIC at 20:49

## 2024-05-15 RX ADMIN — MICONAZOLE NITRATE: 2 POWDER TOPICAL at 10:47

## 2024-05-15 RX ADMIN — SODIUM CHLORIDE: 9 INJECTION, SOLUTION INTRAVENOUS at 21:29

## 2024-05-15 ASSESSMENT — PAIN SCALES - GENERAL: PAINLEVEL_OUTOF10: 0

## 2024-05-15 NOTE — PROGRESS NOTES
Patient unable to follow commands to take evening medications safely and appropriately. Notified Naegele, Andrea NP via perfect.        Electronically signed by Tamra Herndon RN on 5/14/2024 at 10:48 PM

## 2024-05-15 NOTE — PLAN OF CARE
Problem: Safety - Adult  Goal: Free from fall injury  Outcome: Not Progressing     Problem: Discharge Planning  Goal: Discharge to home or other facility with appropriate resources  Outcome: Not Progressing     Problem: Pain  Goal: Verbalizes/displays adequate comfort level or baseline comfort level  Outcome: Not Progressing     Problem: Skin/Tissue Integrity  Goal: Absence of new skin breakdown  Description: 1.  Monitor for areas of redness and/or skin breakdown  2.  Assess vascular access sites hourly  3.  Every 4-6 hours minimum:  Change oxygen saturation probe site  4.  Every 4-6 hours:  If on nasal continuous positive airway pressure, respiratory therapy assess nares and determine need for appliance change or resting period.  Outcome: Not Progressing     Problem: ABCDS Injury Assessment  Goal: Absence of physical injury  Outcome: Not Progressing     Problem: Chronic Conditions and Co-morbidities  Goal: Patient's chronic conditions and co-morbidity symptoms are monitored and maintained or improved  Outcome: Not Progressing     Problem: Safety - Medical Restraint  Goal: Remains free of injury from restraints (Restraint for Interference with Medical Device)  Description: INTERVENTIONS:  1. Determine that other, less restrictive measures have been tried or would not be effective before applying the restraint  2. Evaluate the patient's condition at the time of restraint application  3. Inform patient/family regarding the reason for restraint  4. Q2H: Monitor safety, psychosocial status, comfort, nutrition and hydration  Outcome: Not Progressing     Problem: Safety - Violent/Self-destructive Restraint  Goal: Remains free of injury from restraints (Restraint for Violent/Self-Destructive Behavior)  Description: INTERVENTIONS:  1. Determine that de-escalation and other, less restrictive measures have been tried or would not be effective before applying the restraint  2. Identify and document the criteria for  restraint  3. Evaluate the patient's condition at the time of restraint application  4. Inform patient/family regarding the reason for restraint/seclusion  5. Q2H: Monitor comfort, nutrition and hydration needs  6. Q15M: Perform safety checks including skin, circulation, sensory, respiratory and psychological status  7. Ensure continuous observation  8. Identify and implement measures to help patient regain control, assess readiness for release and initiate progressive release per policy  Outcome: Not Progressing     Problem: Nutrition Deficit:  Goal: Optimize nutritional status  Outcome: Not Progressing

## 2024-05-15 NOTE — CONSULTS
Initial Psychiatric History and Physical    Eliazar Shanel  [id number]  5/7/2024  05/15/24    ID: Patient is a 83 yrs y.o. male    CC: AMS    HPI: Patient is an 83 year old male with pmhx of  a fib on eliquis, coronary artery disease s/p CABG, hypertension, diabetes mellitus type 2, BEBETO as per documentation, dementia, history of CVA, BPH, congestive heart failure, who was sent from the nursing home via EMS to [facility] ED for confusion, fall, strokelike symptoms on 5/7/24. [facility] tele neurology evaluated patient in ED with score of NIH 8 but intervention not recommended as patient is on anticoagulation. AMS due to multiple reasons including SVT and pneumonia as well as progression of dementia. Patient becoming more confused and agitated through hospital stay. MRI done and negative for acute stroke. Sitter and /or  has been utilized.  Consults include cardiology, EP, neurology, interventional  neuro-Radiology, Urology.. Psychiatry consulted by Dr Ng due to \"AMS\"    MAR reviewed. Depakote 125 mg po taken daily. Evening dose sporadically taken. Noted Vistaril 50 mg IM given early in the am 0146. He has required rare doses of calming medications through out his stay. Behavior increased over the last 24 hours.     Met with patient at bedside. Sitter present. Room was dark and blinds down. Patient is sleeping and does not wake to verbal or physical stimulation. Sitter notes he was agitated last night. He has been restless. Discussed delirium precautions. Blinds were opened. Television is on but volume down. Insight and judgment impaired per notes.    Phone call and Per Shanti Alexander. He was agitated last night, \"swinging and pulling.\" Currently he  is letting VS be done as well as oral care. Behaviors get worse around 3 or 4 pm and then at bedtime. Despite what MAR is noting, she has been told he is not taking medications orally. Patient could be heard conversing with nurse and asking for help. Discussed need for EKG.      Past     Strength/tone:  [x] muscle strength and tone appear consistent with age and                                        condition     [] atrophy      [] abnormal movements     Vitals: Blood pressure 134/83, pulse 85, temperature 98.4 °F (36.9 °C), temperature source Axillary, resp. rate 18, height 1.854 m (6' 1\"), weight 100 kg (220 lb 7.4 oz), SpO2 96 %.  CONSTITUTIONAL:    Appearance: appears stated age. alert and oriented to person, disoriented to place, time & situation. no acute distress. Adequate grooming and hygeine. Poor eye contact. No prominent physical abnormalities.  Attitude: Manner is cooperative and confused  Motor: Noted mild psychomotor agitation, retardation or abnormal movements noted  Speech: Not Clearly articulated- garbled and slurred; normal rate, volume, tone & amount.  Language: ? intact understanding and production  Mood: ANTHONY  Affect: confused  , non-labile, congruent with mood and content of speech  Thought Production: Spontaneous.  Thought Form: Coherent, not linear, logical but goal-directed. No tangentiality or circumstantiality. No flight of ideas or loosening of associations.  Thought Content/Perceptions: No PADMA, no AVH, no delusion  Insight: impaired  Judgment impaired  Memory: Immediate, recent, and remote appear impaired, though not formally tested.  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   For acute agitation would  recommend ativan despite risk of increased confusion at this time. Please attempt to redirect first.  Standard Delirium precautions:  o Redirect / reorient / reassure the patient  o Early mobilization (please allow patient to walk halls with assistance if needed)   o Reduce noise and provide adequate daytime light in the room; eula-side room preferable if available  o Prevent sleep deprivation  o Minimize use of anticholinergic drugs, benzodiazepines, and narcotics  o Use of eyeglasses and hearing aids  o Treat volume depletion  o Bowel regimens  o Avoid lines or catheters if possible  o Monitor for unintentional self-harm  o Assess fall risk    Labs and EKG reviewed  5/10 EKG qtc  536   5/15 EKG QTC  565  5/15 hemoglobin 15, platelets 197, Na+ 139, K+ 4.1, BUN 16, cr 1.1, est glom filt rate 67  5/14 UA trace of leukocyte esterase,   PS to Dr Ng regarding recommendations    Electronically signed by KALI Law CNP on 5/15/2024 at 8:38 AM

## 2024-05-15 NOTE — PROGRESS NOTES
V2.0  [facility] Hospitalist Progress Note      Name:  Eliazar Ugarte /Age/Sex: 1940  (83 y.o. male)   MRN & CSN:  [id number] & [id number] Encounter Date/Time: 5/15/2024 12:25 PM EDT    [facility]  [facility] PCP: Roberta Schafer       [facility] Day: 9    Assessment and Plan:   Eliazar Ugarte is a 83 y.o. male  who presents with Acute encephalopathy    Patient is awaiting placement to SNF however needs to be sitter free for 24 hours to be discharged.  Patient is currently confused and is currently requiring sitter due to behavioral disturbances and danger to himself.    Plan:  #.  Pneumonia  -Chest x-ray-right upper lobe suprahilar airspace disease   -Blood cultures drawn in ED negative  -Procalcitonin 0.199  -IV antibiotics: Vancomycin plus cefepime.  Will discontinue antibiotics as there is no evidence of any infectious etiology at present that would require continued antibiotic administration  -Follow clinically    #.  Strokelike symptoms  -Stroke alert called in ED  -Last well-known-5 PM  -NIHSS could not be done as patient is currently sleeping, not able to be kept awake.  -CT head-no acute intracranial abnormality  -CTA head and neck-atherosclerotic changes noted in the region of the carotid bifurcation bilaterally.  Left greater than the right with approximately 40% diameter stenosis and small focal adherent thrombus seen within the lumen of the proximal left internal carotid artery.  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  -Patient was not deemed a TNK candidate as he is on Eliquis.  -NIHSS/PT/OT/SLP evaluation ordered  -MRI brain: No acute stroke  -Continue aspirin, statin  -Consult neurology and Neuro interventional, no indication for any acute intervention  -Angiogram 24     #.  SVT  -As per documentation, enroute to the hospital  -Received 6 mg, 12 mg of adenosine  -EKG on [id number]-[id number] with RVR-.  -Consult cardiology.  -Continune Beta blocker     Urinary  venous phases are unremarkable. The catheter was brought into the left common carotid artery and a biplane cervical angiogram was done which shows contrast flowing antegrade direction to the common internal and external carotid arteries.  There are atherosclerotic changes at the bifurcation producing a 20 to 30% stenosis no thrombus or flow-limiting lesion. Next a cerebral angiogram was done from that location which shows contrast flowing in antegrade direction to the intracranial portion of the internal carotid artery filling the middle cerebral artery and anterior cerebral artery.  Mild atherosclerotic changes are identified in the intracranial internal carotid artery without any significant stenosis.  Capillary and venous phases are within normal limits. Next the catheter was removed from the body.  A right common femoral artery angiogram was done which shows the arterial stick above the bifurcation and below the inguinal ligament.  There is no significant stenosis.  Angio-Seal was used for hemostasis.  There is no hematoma and pulses were intact. Summary: 70% irregular stenosis of the proximal portion of the right cervical vertebral artery.  Flow is delayed from this lesion.  There is a second stenosis in the V3 segment of 50%.  There is adequate flow from the left vertebral artery. 50% stenosis at the origin of the left vertebral artery. Atherosclerotic changes identified in the bilateral posterior cerebral arteries. 20 to 30% stenosis at the proximal portion of the left internal carotid artery without any flow-limiting lesion or identifiable thrombus. Diffuse atherosclerotic changes. No intervention recommended. Electronically signed by Ronel Magdaleno DO on 5/9/2024 at 11:24 AM     MRI brain without contrast    Result Date: 5/8/2024  Reason: Possible stroke MRI brain without contrast Technique: T1, T2, diffusion-weighted axial, T1 sagittal sequences received for interpretation. FINDINGS: On diffusion-weighted  sequences, no abnormal signals identified. On FLAIR sequences, moderate periventricular increased signals identified. Less than 1 cm high signal foci identified bilateral cerebral cortices, there are approximately 4 on the right and 6 on the left. No air-fluid levels identified in the visualized paranasal sinuses or mastoid air cells. The pituitary fossa and suprasellar region unremarkable.     1. No evidence for acute or subacute ischemic process the brain 2. Moderate periventricular chronic lupus encephalopathy 3. Scattered less than 1 cm high signal foci identified in the long TE sequences that may related to areas of ischemic demyelination versus remote infarcts Electronically signed by MD Marquis Ovalles    Echo (TTE) complete (PRN contrast/bubble/strain/3D)    Result Date: 5/8/2024    Left Ventricle: Low normal left ventricular systolic function with a visually estimated EF of 50 - 55%. Mild posterior thickening. Sigmoid septum causing turbulent flow through the LVOT.   Aortic Valve: Moderate sclerosis of the aortic valve cusp.   Mitral Valve: Mild annular calcification of the mitral valve. Mild regurgitation.   Tricuspid Valve: Trace regurgitation. The estimated RVSP is 28 mmHg.   Left Atrium: Left atrium is dilated.   Pericardium: No pericardial effusion.   Image quality is technically difficult. Contrast used: Definity. Technically difficult study.     XR CHEST PORTABLE    Result Date: 5/7/2024  EXAM: PORTABLE AP CHEST X-RAY, 5/7/2024 INDICATION: stroke COMPARISON: 1/27/2023 FINDINGS: HEART / MEDIASTINUM: Stable heart size and mediastinal contours LUNGS/PLEURA: Lungs are expiratory, no dense consolidation, visible pleural effusion or pneumothorax. There is right upper lobe suprahilar airspace disease. BONES / SOFT TISSUES: No acute abnormality. OTHER: None.     1. Right upper lobe suprahilar airspace disease. Follow-up to clearing is recommended. Electronically signed by Marcell Mcgrath MD    CTA HEAD NECK W

## 2024-05-15 NOTE — PROGRESS NOTES
Occupational Therapy  OT attempted to see pt this AM for follow up. Pt to be held on this date per discussion w/ LEANN Moser, citing increased confusion and need for ongoing testing. Will reattempt to see pt as able.     Gisselle Lane OTR/L [id number]  5/15/2024     11:00 AM

## 2024-05-15 NOTE — PROGRESS NOTES
Physical Therapy    Physical Therapy Treatment Note  Name: Eliazar Ugarte MRN: [id number] :   1940   Date:  5/15/2024   Admission Date: 2024 Room:  [address] [place] [address]   Restrictions/Precautions:          fall risk, gen prec  Communication with other providers:  nurse states pt ok to treat  Subjective:  Patient states:  agreeable  Pain:   Location, Type, Intensity (0/10 to 10/10):  does not rate    Objective:    Observation:  in bed  Treatment, including education/measures:  Bed mob sup>sit requiring maxAx1/2  Vc for sequencing  STS and SPT /c modAx1/2 x multiple sets from bed, chair and toilet.   Pt needs max cues to initiate and carry out all interventions. Poor motor planning and por carry over to vc, improves /c tc and walker guidance.   Increased time to complete and start all interventions.   Pt /c L lat trunk lean in sitting and standing, affects gait as well.  Stand balance during functional tasks x~4' Poor+/F- grade static  Gait training /c FWW x~40', then ~10,10' /c min-modA and walker guidance, cues throughout for safety, motor planning, sequencing.   Safety  Patient left safely in the chair, with call light/phone in reach with alarm applied. Gait belt was used for transfers and gait.     Assessment / Impression:    Patient's tolerance of treatment:  good   Adverse Reaction: na  Significant change in status and impact:  na  Barriers to improvement:  weakness, cognition, endurance  Plan for Next Session:    Cont gait training and transfer training   Time in:  1318  Time out:  1358  Timed treatment minutes:  40  Total treatment time:  40    Previously filed items:  Social/Functional History  Lives With: Alone  Type of Home: Assisted living ([facility])  Home Layout: One level  Home Access: Level entry  Home Equipment: Walker - Rolling  ADL Assistance: Independent  Homemaking Assistance: Needs assistance  Homemaking Responsibilities: Yes  Ambulation Assistance: Independent  Transfer Assistance:  Independent  Active : No        Short Term Goals  Short Term Goal 1: Pt will ambulate 100' with RW and SUP  Short Term Goal 2: Pt will perform supine to sit with SUP  Short Term Goal 3: Pt will perform sit to stand with SUP    Electronically signed by:    German Bach, AKIRA  5/15/2024, 3:12 PM

## 2024-05-16 LAB
GLUCOSE BLD-MCNC: 107 MG/DL (ref 70–99)
GLUCOSE BLD-MCNC: 113 MG/DL (ref 70–99)
GLUCOSE BLD-MCNC: 116 MG/DL (ref 70–99)
GLUCOSE BLD-MCNC: 96 MG/DL (ref 70–99)

## 2024-05-16 PROCEDURE — 6370000000 HC RX 637 (ALT 250 FOR IP): Performed by: NURSE PRACTITIONER

## 2024-05-16 PROCEDURE — 6360000002 HC RX W HCPCS: Performed by: FAMILY MEDICINE

## 2024-05-16 PROCEDURE — 51798 US URINE CAPACITY MEASURE: CPT

## 2024-05-16 PROCEDURE — 99233 SBSQ HOSP IP/OBS HIGH 50: CPT | Performed by: NURSE PRACTITIONER

## 2024-05-16 PROCEDURE — 6370000000 HC RX 637 (ALT 250 FOR IP): Performed by: STUDENT IN AN ORGANIZED HEALTH CARE EDUCATION/TRAINING PROGRAM

## 2024-05-16 PROCEDURE — 6370000000 HC RX 637 (ALT 250 FOR IP): Performed by: INTERNAL MEDICINE

## 2024-05-16 PROCEDURE — 94761 N-INVAS EAR/PLS OXIMETRY MLT: CPT

## 2024-05-16 PROCEDURE — 82962 GLUCOSE BLOOD TEST: CPT

## 2024-05-16 PROCEDURE — 2140000000 HC CCU INTERMEDIATE R&B

## 2024-05-16 RX ORDER — HYDROXYZINE HYDROCHLORIDE 50 MG/ML
50 INJECTION, SOLUTION INTRAMUSCULAR ONCE
Status: COMPLETED | OUTPATIENT
Start: 2024-05-16 | End: 2024-05-16

## 2024-05-16 RX ADMIN — TAMSULOSIN HYDROCHLORIDE 0.4 MG: 0.4 CAPSULE ORAL at 21:28

## 2024-05-16 RX ADMIN — DIVALPROEX SODIUM 500 MG: 125 CAPSULE, COATED PELLETS ORAL at 21:31

## 2024-05-16 RX ADMIN — DIVALPROEX SODIUM 125 MG: 125 CAPSULE, COATED PELLETS ORAL at 10:31

## 2024-05-16 RX ADMIN — HYDROXYZINE HYDROCHLORIDE 50 MG: 50 INJECTION, SOLUTION INTRAMUSCULAR at 01:01

## 2024-05-16 RX ADMIN — METOPROLOL TARTRATE 25 MG: 25 TABLET, FILM COATED ORAL at 21:28

## 2024-05-16 RX ADMIN — DOCUSATE SODIUM 100 MG: 100 CAPSULE, LIQUID FILLED ORAL at 10:31

## 2024-05-16 RX ADMIN — MICONAZOLE NITRATE: 2 POWDER TOPICAL at 21:29

## 2024-05-16 RX ADMIN — FUROSEMIDE 20 MG: 20 TABLET ORAL at 10:31

## 2024-05-16 RX ADMIN — CARBOXYMETHYLCELLULOSE SODIUM 1 DROP: 10 GEL OPHTHALMIC at 21:33

## 2024-05-16 RX ADMIN — ATORVASTATIN CALCIUM 80 MG: 40 TABLET, FILM COATED ORAL at 21:28

## 2024-05-16 RX ADMIN — VITAMIN B COMPLEX 1 CAPSULE: at 10:36

## 2024-05-16 RX ADMIN — Medication 5000 UNITS: at 10:32

## 2024-05-16 RX ADMIN — MIDODRINE HYDROCHLORIDE 5 MG: 5 TABLET ORAL at 12:17

## 2024-05-16 RX ADMIN — MIDODRINE HYDROCHLORIDE 5 MG: 5 TABLET ORAL at 10:30

## 2024-05-16 RX ADMIN — METOPROLOL TARTRATE 25 MG: 25 TABLET, FILM COATED ORAL at 10:31

## 2024-05-16 RX ADMIN — POTASSIUM BICARBONATE 20 MEQ: 782 TABLET, EFFERVESCENT ORAL at 10:35

## 2024-05-16 NOTE — PROGRESS NOTES
Psychiatric Progress Note    Eliazar Ugarte  [id number]  05/16/24    CHIEF COMPLAINT: AMS     HPI: Patient is an 83 year old male with pmhx of  a fib on eliquis, coronary artery disease s/p CABG, hypertension, diabetes mellitus type 2, BEBETO as per documentation, dementia, history of CVA, BPH, congestive heart failure, who was sent from the nursing home via EMS to [facility] ED for confusion, fall, strokelike symptoms on 5/7/24. [facility] tele neurology evaluated patient in ED with score of NIH 8 but intervention not recommended as patient is on anticoagulation. AMS due to multiple reasons including SVT and pneumonia as well as progression of dementia. Patient becoming more confused and agitated through hospital stay. MRI done and negative for acute stroke. Sitter and /or COMPA has been utilized.  Consults include cardiology, EP, neurology, interventional  neuro-Radiology, Urology.. Psychiatry consulted by Dr Ng due to \"AMS\"    Met with patient this am at bedside. Sitter, nurse and daughter assisting patient in obtaining his medications which is going quite slowly and is difficult for all. He is argumentative but taking the medications as encouraged. Room is with blinds up and television on. Noted patient received calming medications in early am. Insight and judgment are impaired.    Medications Prior to Admission: glipiZIDE (GLUCOTROL) 5 MG tablet, Take 1 tablet by mouth 2 times daily (before meals)  furosemide (LASIX) 20 MG tablet, Take 1 tablet by mouth daily  potassium chloride (KLOR-CON) 20 MEQ packet, Take 20 mEq by mouth daily  carboxymethylcellulose 1 % ophthalmic solution, Place 1 drop into both eyes 3 times daily  Cholecalciferol (VITAMIN D3) 125 MCG (5000 UT) TABS, Take by mouth  divalproex (DEPAKOTE) 125 MG DR tablet, Take 1 tablet by mouth daily  divalproex (DEPAKOTE) 500 MG DR tablet, Take 1 tablet by mouth at bedtime  melatonin 3 MG TABS tablet, Take 2 tablets by mouth nightly as needed (for sleep)  Probiotic  Product (ACIDOPHILUS PEARLS PO), Take 1 capsule by mouth daily  atorvastatin (LIPITOR) 40 MG tablet, Take 40 mg by mouth nightly  docusate sodium (COLACE) 100 MG capsule, Take 100 mg by mouth daily  apixaban (ELIQUIS) 5 MG TABS tablet, Take 5 mg by mouth 2 times daily  Multiple Vitamins-Minerals (EYE MULTIVITAMIN/SODIUM PO), Take 1 tablet by mouth 2 times daily  Omega-3 Fatty Acids (FISH OIL) 1000 MG capsule, Take 1,000 mg by mouth daily  omeprazole (PRILOSEC) 20 MG delayed release capsule, Take 20 mg by mouth daily  Calcium Carb-Cholecalciferol (OYSTER SHELL CALCIUM + D PO), Take 1 tablet by mouth daily Receives 500/200 mg tablets  tamsulosin (FLOMAX) 0.4 MG capsule, Take 0.4 mg by mouth nightly  b complex vitamins capsule, Take 1 capsule by mouth daily  nitroGLYCERIN (NITROSTAT) 0.4 MG SL tablet, Place 0.4 mg under the tongue every 5 minutes as needed for Chest pain up to max of 3 total doses. If no relief after 1 dose, call 911.  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  Thought Content/Perceptions: No PADMA, no AVH, no delusion  Insight: impaired  Judgment impaired  Memory: Immediate, recent, and remote appear impaired, though not formally tested.  Attention: not maintained throughout interview  Fund of knowledge: Average  Gait/Balance: ANTHONY         IMPRESSION:   Acute encephalopathy  Delirium due to medical  Major neurocognitive disorder due to vascular changes  Other sequelae of cerebrovascular disease     Plan:  Recommend Depakote 125 mg po or IVPB daily  Recommend Depakote 500 mg po or IVPB at bedtime  Recommend EKG in the am 5/17/24.   Due to elevated QTC now 565, avoid all qtc prolonging medications including antipsychotics.   For acute agitation would recommend ativan despite risk of increased confusion at this time. Please attempt to redirect first.  Standard Delirium precautions:  o Redirect / reorient / reassure the patient  o Early mobilization (please allow patient to walk halls with assistance if needed)   o Reduce noise and provide adequate daytime light in the room; eula-side room preferable if available  o Prevent sleep deprivation  o Minimize use of anticholinergic drugs, benzodiazepines, and narcotics  o Use of eyeglasses and hearing aids  o Treat volume depletion  o Bowel regimens  o Avoid lines or catheters if possible  o Monitor for unintentional self-harm  o Assess fall risk    Labs and EKG reviewed  5/10 EKG qtc  536   5/15 EKG QTC  565  5/15 valproic acid level 15.0- most likely patient has not been getting Depakote due to low level  5/15 hemoglobin 15, platelets 197, Na+ 139, K+ 4.1, BUN 16, cr 1.1, est glom filt rate 67  5/14 UA trace of leukocyte esterase,   Psychiatric management:medication initiation and titration, group and individual therapy, safe and theraputic environment.  Status of problem/condition: ?Improving  Medical [facility]-morbidities: [facility], appreciate assistance  Legal  Status:Pending  Disposition:estimated LOS: Pending.  The treatment team reviewed with the patient the diagnosis and treatment recommendations to include the risks, benefits, and side effects of chosen medications.  The patient verbalized understanding and agreed with the treatment regimen as outlined above.  The patient was encouraged to participate in groups.  Medical records, Labs, Diagnotic tests reviewed  q15 min safety checks for safety  Interval History.  Review current labs  Continue current medications  Supportive Therapy Provided  Pt had an opportunity to ask questions and address concerns  Pt encouraged to continue therapy group or individual.  Pt was in agreement with treatment plan.  The risks benefits and side effects of medications were discussed with the patient, including alternatives and no treatment.    Electronically signed by KALI Law CNP on 5/16/2024 at 2:15 PM

## 2024-05-16 NOTE — PROGRESS NOTES
V2.0  [facility] Hospitalist Progress Note      Name:  Eliazar Ugarte /Age/Sex: 1940  (83 y.o. male)   MRN & CSN:  [id number] & [id number] Encounter Date/Time: 2024 12:25 PM EDT    Location:  [address] [place] [address]-A PCP: Roberta Schafer       [facility] Day: 10    Assessment and Plan:   Eliazar Ugarte is a 83 y.o. male  who presents with Acute encephalopathy    Patient is awaiting placement to SNF however needs to be sitter free for 24 hours to be discharged.  Patient is currently confused and is currently requiring sitter due to behavioral disturbances and danger to himself.    Plan:  #.  Pneumonia - ruled out  -Chest x-ray-right upper lobe suprahilar airspace disease   -Blood cultures drawn in ED negative  -Procalcitonin 0.199  -IV antibiotics: Vancomycin plus cefepime.  Will discontinue antibiotics as there is no evidence of any infectious etiology at present that would require continued antibiotic administration  -Follow clinically    #.  Strokelike symptoms - stroke ruled out  -Stroke alert called in ED  -Last well-known-5 PM  -NIHSS could not be done as patient is currently sleeping, not able to be kept awake.  -CT head-no acute intracranial abnormality  -CTA head and neck-atherosclerotic changes noted in the region of the carotid bifurcation bilaterally.  Left greater than the right with approximately 40% diameter stenosis and small focal adherent thrombus seen within the lumen of the proximal left internal carotid artery.  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  -Patient was not deemed a TNK candidate as he is on Eliquis.  -NIHSS/PT/OT/SLP evaluation ordered  -MRI brain: No acute stroke  -Continue aspirin, statin  -Consult neurology and Neuro interventional, no indication for any acute intervention  -Angiogram 24     #.  SVT  -As per documentation, enroute to the hospital  -Received 6 mg, 12 mg of adenosine  -EKG on [id number] with RVR-.  -Consult cardiology.  -Continune Beta blocker    Mental Status, Fall, and Extremity Weakness     Patient seen and evaluated at bedside.  Sitter in place.  As per sitter patient has been yelling intermittently and has been confused.  Patient noted to be confused on my exam as well.    Review of Systems:    Review of Systems   Unable to perform ROS: Mental status change         Objective:     Intake/Output Summary (Last 24 hours) at 5/16/2024 0906  Last data filed at 5/16/2024 0656  Gross per 24 hour   Intake 2088.73 ml   Output 1200 ml   Net 888.73 ml          Vitals:   Vitals:    05/15/24 2331   BP: 105/74   Pulse: 93   Resp: 16   Temp: 98.6 °F (37 °C)   SpO2: 92%       Physical Exam:     Physical Exam  Constitutional:       General: He is not in acute distress.     Appearance: Normal appearance.   HENT:      Head: Normocephalic and atraumatic.      Nose: Nose normal.      Mouth/Throat:      Mouth: Mucous membranes are moist.      Pharynx: Oropharynx is clear.   Eyes:      Extraocular Movements: Extraocular movements intact.      Conjunctiva/sclera: Conjunctivae normal.      Pupils: Pupils are equal, round, and reactive to light.   Cardiovascular:      Rate and Rhythm: Normal rate and regular rhythm.      Pulses: Normal pulses.      Heart sounds: Normal heart sounds.   Pulmonary:      Effort: Pulmonary effort is normal.      Breath sounds: No rhonchi.   Abdominal:      General: Abdomen is flat. Bowel sounds are normal.      Palpations: Abdomen is soft.   Musculoskeletal:         General: Normal range of motion.      Cervical back: Normal range of motion and neck supple.      Comments: Right lower chest ecchymosis   Skin:     General: Skin is warm and dry.   Neurological:      General: No focal deficit present.      Mental Status: He is alert and oriented to person, place, and time. Mental status is at baseline.   Psychiatric:         Mood and Affect: Mood normal.         Behavior: Behavior normal.         Thought Content: Thought content normal.  reviewed for CT angiographic technique. IV contrast: 75 mL Isovue-370. FINDINGS: ANTERIOR CIRCULATION: The intracranial internal carotid arteries, anterior cerebral arteries, and middle cerebral arteries demonstrate no occlusion or stenosis. No evidence for aneurysm or arteriovenous malformation. POSTERIOR CIRCULATION: The distal left vertebral artery, basilar artery and posterior cerebral arteries demonstrate no occlusion or stenosis. No evidence for aneurysm or arteriovenous malformation. INTRACRANIAL VENOUS SYSTEM: There is no significant enhancement seen of the dural venous sinuses. This may be due to early phase of contrast but thrombus is not excluded. Refer to recent noncontrast CT of the head for additional results.. IMPRESSION: No significant intracranial arterial vascular pathology is identified. There is nonenhancement of the dural saphenous sinuses which may be due to early phase of contrast opacification but the possibility of thrombosis cannot be excluded on the basis of this exam. Electronically signed by Shine Ignacio MD    CT CERVICAL SPINE WO CONTRAST    Result Date: 5/7/2024  EXAM: CT CERVICAL SPINE WO CONTRAST INDICATION: Fall COMPARISON: None Technical factors: Non-contrast CT imaging was performed of the cervical spine. Axial and multiplanar reformatted images reviewed.   Individualized dose optimization technique was used in order to meet ALARA standards for radiation dose reduction.  In addition to vendor specific dose reduction algorithms, the dose reduction techniques vary based on the specific scanner utilized but frequently include automated exposure control, adjustment of the mA and/or kV according to patient size, and use of iterative reconstruction technique. Contrast: None FINDINGS: No evidence of acute fracture or traumatic abnormality is seen. Multilevel spondylotic changes seen with uncovertebral spurring at multiple levels noted bilaterally throughout the cervical spine. If  concern discogenic disease, MRI correlation would be recommended.     No fracture seen. Electronically signed by Shine Ignacio MD    CT HEAD WO CONTRAST    Result Date: 5/7/2024  CT HEAD WITHOUT CONTRAST HISTORY: Stroke COMPARISON: 1/27/2023 FINDINGS: Age-related atrophy is seen with periventricular low-attenuation seen diffusely consistent with chronic ischemic myelopathy. There is no evidence of intracranial hemorrhage or abnormal extra-axial fluid collection. There are no definite signs to suggest acute infarction. However, if there is concern of early infarction or other subtle intracranial abnormality, MRI correlation should be considered. Because of thickening is seen in the bilateral ethmoid air cells.     No evidence of acute intracranial abnormality. Electronically signed by Shine Ignacio MD      Comment: Please note this report has been produced using speech recognition software and may contain errors related to that system including errors in grammar, punctuation, and spelling, as well as words and phrases that may be inappropriate. If there are any questions or concerns please feel free to contact the dictating provider for clarification.     Electronically signed by Paris Ng MD on 5/16/2024 at 9:06 AM

## 2024-05-16 NOTE — PLAN OF CARE
Problem: Safety - Adult  Goal: Free from fall injury  Outcome: Progressing     Problem: Discharge Planning  Goal: Discharge to home or other facility with appropriate resources  Outcome: Progressing  Flowsheets (Taken 5/15/2024 2028)  Discharge to home or other facility with appropriate resources:   Identify barriers to discharge with patient and caregiver   Arrange for needed discharge resources and transportation as appropriate   Identify discharge learning needs (meds, wound care, etc)     Problem: Pain  Goal: Verbalizes/displays adequate comfort level or baseline comfort level  Outcome: Progressing  Flowsheets (Taken 5/15/2024 2028)  Verbalizes/displays adequate comfort level or baseline comfort level:   Encourage patient to monitor pain and request assistance   Assess pain using appropriate pain scale   Administer analgesics based on type and severity of pain and evaluate response   Implement non-pharmacological measures as appropriate and evaluate response   Notify Licensed Independent Practitioner if interventions unsuccessful or patient reports new pain   Consider cultural and social influences on pain and pain management     Problem: Skin/Tissue Integrity  Goal: Absence of new skin breakdown  Description: 1.  Monitor for areas of redness and/or skin breakdown  2.  Assess vascular access sites hourly  3.  Every 4-6 hours minimum:  Change oxygen saturation probe site  4.  Every 4-6 hours:  If on nasal continuous positive airway pressure, respiratory therapy assess nares and determine need for appliance change or resting period.  Outcome: Progressing     Problem: ABCDS Injury Assessment  Goal: Absence of physical injury  Outcome: Progressing     Problem: Chronic Conditions and Co-morbidities  Goal: Patient's chronic conditions and co-morbidity symptoms are monitored and maintained or improved  Outcome: Progressing  Flowsheets (Taken 5/15/2024 2028)  Care Plan - Patient's Chronic Conditions and Co-Morbidity  Symptoms are Monitored and Maintained or Improved:   Monitor and assess patient's chronic conditions and comorbid symptoms for stability, deterioration, or improvement   Collaborate with multidisciplinary team to address chronic and comorbid conditions and prevent exacerbation or deterioration   Update acute care plan with appropriate goals if chronic or comorbid symptoms are exacerbated and prevent overall improvement and discharge     Problem: Nutrition Deficit:  Goal: Optimize nutritional status  Outcome: Progressing     Problem: Safety - Medical Restraint  Goal: Remains free of injury from restraints (Restraint for Interference with Medical Device)  Description: INTERVENTIONS:  1. Determine that other, less restrictive measures have been tried or would not be effective before applying the restraint  2. Evaluate the patient's condition at the time of restraint application  3. Inform patient/family regarding the reason for restraint  4. Q2H: Monitor safety, psychosocial status, comfort, nutrition and hydration  Outcome: Completed     Problem: Safety - Violent/Self-destructive Restraint  Goal: Remains free of injury from restraints (Restraint for Violent/Self-Destructive Behavior)  Description: INTERVENTIONS:  1. Determine that de-escalation and other, less restrictive measures have been tried or would not be effective before applying the restraint  2. Identify and document the criteria for restraint  3. Evaluate the patient's condition at the time of restraint application  4. Inform patient/family regarding the reason for restraint/seclusion  5. Q2H: Monitor comfort, nutrition and hydration needs  6. Q15M: Perform safety checks including skin, circulation, sensory, respiratory and psychological status  7. Ensure continuous observation  8. Identify and implement measures to help patient regain control, assess readiness for release and initiate progressive release per policy  Outcome: Completed

## 2024-05-16 NOTE — PLAN OF CARE
Problem: Safety - Adult  Goal: Free from fall injury  5/16/2024 0821 by Natalie Mays RN  Outcome: Progressing  5/16/2024 0336 by Frankie Gaitan RN  Outcome: Progressing     Problem: Safety - Adult  Goal: Free from fall injury  5/16/2024 0821 by Natalie Mays RN  Outcome: Progressing  5/16/2024 0336 by Frankie Gaitan RN  Outcome: Progressing     Problem: Discharge Planning  Goal: Discharge to home or other facility with appropriate resources  5/16/2024 0821 by Natalie Mays RN  Outcome: Progressing  5/16/2024 0336 by Frankie Gaitan RN  Outcome: Progressing  Flowsheets (Taken 5/15/2024 2028)  Discharge to home or other facility with appropriate resources:   Identify barriers to discharge with patient and caregiver   Arrange for needed discharge resources and transportation as appropriate   Identify discharge learning needs (meds, wound care, etc)     Problem: Pain  Goal: Verbalizes/displays adequate comfort level or baseline comfort level  5/16/2024 0821 by Natalie Masy RN  Outcome: Progressing  5/16/2024 0336 by Frankie Gaitan RN  Outcome: Progressing  Flowsheets (Taken 5/15/2024 2028)  Verbalizes/displays adequate comfort level or baseline comfort level:   Encourage patient to monitor pain and request assistance   Assess pain using appropriate pain scale   Administer analgesics based on type and severity of pain and evaluate response   Implement non-pharmacological measures as appropriate and evaluate response   Notify Licensed Independent Practitioner if interventions unsuccessful or patient reports new pain   Consider cultural and social influences on pain and pain management     Problem: Skin/Tissue Integrity  Goal: Absence of new skin breakdown  Description: 1.  Monitor for areas of redness and/or skin breakdown  2.  Assess vascular access sites hourly  3.  Every 4-6 hours minimum:  Change oxygen saturation probe site  4.  Every 4-6 hours:  If on nasal continuous positive airway  Behavior)  Description: INTERVENTIONS:  1. Determine that de-escalation and other, less restrictive measures have been tried or would not be effective before applying the restraint  2. Identify and document the criteria for restraint  3. Evaluate the patient's condition at the time of restraint application  4. Inform patient/family regarding the reason for restraint/seclusion  5. Q2H: Monitor comfort, nutrition and hydration needs  6. Q15M: Perform safety checks including skin, circulation, sensory, respiratory and psychological status  7. Ensure continuous observation  8. Identify and implement measures to help patient regain control, assess readiness for release and initiate progressive release per policy  5/16/2024 0336 by Frankie Gaitan, RN  Outcome: Completed     Problem: Nutrition Deficit:  Goal: Optimize nutritional status  5/16/2024 0821 by Natalie Mays, RN  Outcome: Progressing  5/16/2024 0336 by Frankie Gaitan, RN  Outcome: Progressing

## 2024-05-16 NOTE — PROGRESS NOTES
Patient became agitated at 0000 pulled out IV and grabbing at staff.  Patient lifted from chair back into bed.  Hospitalist notified and one time dose of IM Vistaril ordered.    0030  Patient complaining of need to urinate with vance in place.  Bladder scan performed, 392 ml noted.  Vance flushed easy but no return seen.    0040  Patient voiding around vance.  Vance removed, clot seen in vance tip.    0100  IM Vistaril received and given at 0101    0400 Patient awake becoming aggressive, warm blankets applied per patient request.

## 2024-05-16 NOTE — CARE COORDINATION
Pt is from [facility]. Daughter wants pt to go skilled at [facility]. Dorothy/Ross is aware.  Pt has dementia and has a sitter.  Pt will require a pre-cert and will need to be sitter free 24 hrs prior to d/c.  D/c instructions are on front of packet located with the soft chart.   TE     DO NOT SEND PT TO SNF UNTIL A PRE-CERT IS OBTAINED AND HE IS SITTER FREE 24 HRS PRIOR TO D/C.   TO NOTIFY CM WHEN TO START THE PRE-CERT.  TE

## 2024-05-16 NOTE — ADT AUTH CERT
MD Gabe at 24 0045     Expand All Collapse All  Neurology Service Consult Note  [facility]   Patient Name: Eliazar Ugarte            : 1940           Subjective:   Reason for consult: Altered mental status, fall, extremity weakness and concern for stroke  83 y.o.  male with history of a fib on Eliquis 2.5 mg twice daily, stroke, DM, HLD, HTN, dementia presenting to [facility] from facility via EMS with complaints of AMS, fall and weakness. EMS noted left facial weakness. Patient was noted to be in SVT for EMS who administered two doses of adenosine, 6 mg and 12 mg respectively en route. Stroke alert was called and patient was evaluated by [facility] tele neurology service, initial NIH 8, intervention was not recommended as patient is on anticoagulation.      Chart was reviewed in detail, patient was seen and assessed. He is resting in bed awake and alert to self only. He is very hard of hearing, tells me he has hearing aids but is not allowed to wear them due to was build up. He is able to tell me  he lives at the [facility] but is not sure where he is today. Unable to identify month or year. He can identify common objects and colors. No family at bedside to help provide history.      Past Medical History        Past Medical History:   Diagnosis Date    Atrial fibrillation (HCC)      Cerebral artery occlusion with cerebral infarction (HCC)      Diabetes mellitus (HCC)      Hyperlipidemia      Hypertension         :   Past Surgical History   History reviewed. No pertinent surgical history.     Medications:  Scheduled Meds:  Scheduled Medications    sodium chloride flush  5-40 mL IntraVENous 2 times per day    aspirin  81 mg Oral Daily     Or    aspirin  300 mg Rectal Daily    atorvastatin  80 mg Oral Nightly    apixaban  2.5 mg Oral BID    carboxymethylcellulose  1 drop Both Eyes TID    Vitamin D  5,000 Units Oral Daily    divalproex  125 mg Oral Daily             Heel-to-Shin: no dysmetria b/l      Finger-to-Nose: no dysmetria b/l     Gait and stance:      Gait: deferred        LABS:           Recent Labs     05/07/24 1949 05/08/24  0318   WBC 12.0* 11.0*   * 136   K 4.2 4.2   CL 96* 99   CO2 21 25   BUN 18 18   CREATININE 1.5* 1.5*   GLUCOSE 204* 153*   ALBUMIN 3.2*  --    INR 1.4  --             IMAGING:    CT head w/o contrast:  IMPRESSION:     No evidence of acute intracranial abnormality.     CTA head and neck:  IMPRESSION:     Atherosclerotic changes seen in the region of the carotid bifurcation bilaterally, left greater the right with approximately 40% diameter stenosis and small focal adherent thrombus seen within the lumen of the proximal left internal carotid artery.     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     Dominant left vertebral artery gives rise to the basilar artery and shows atherosclerotic calcifications both distally and at its origin.     IMPRESSION:     No significant intracranial arterial vascular pathology is identified.     There is nonenhancement of the dural saphenous sinuses which may be due to early phase of contrast opacification but the possibility of thrombosis cannot be excluded on the basis of this exam.     MRI brain w/o contrast:  Pending        All imaging was personally reviewed     ASSESSMENT/PLAN:   83 year old male presenting with altered mental status, fall, SVT. Patient is alert to self only, unclear what his baseline is but does have history of dementia and is on Depakote for behaviors at baseline. No focal or lateralizing deficits on exam.   Altered mental status likely secondary to metabolic encephalopathy superimposed on SVT, JOSH on CKD, pneumonia. Low suspicion for acute stroke however this can not be excluded pending MRI  Neuro imaging as above  CT head non acute  CTA head and neck does note small focal adherent thrombus in the proximal left ICA, Right vertebral artery occlusion as well as possible  thrombosis of the dural saphenous sinuses  Dr. Magdaleno, neuro intervention consulted for CTA findings  Plan for angiogram tomorrow  Continue Eliquis, atorvastatin for secondary stroke prevention  From neurology standpoint do not need aspirin in addition to statin   PT/OT/ST  Strict delirium precautions: Encourage wakefulness during the day time hours with lights on, blinds open, out of bed. Reorient as needed. Minimize disruption to sleep during night time hours as able.   Management of underlying infection per IM  Cardiology following  Will continue to follow pending completion of neurodiagnostics, clinical course.         Thank you for allowing us to participate in the care of your patient.  If there are any questions regarding evaluation please feel free to contact us.      Haley Thomson, APRN - CNP, 5/8/2024      Attending Addendum:     I have discussed this patient with the mid-level provider.  I have personally seen and examined the patient and reviewed the diagnostic testing and any changes in the history or physical exam are documented above.  I agree with the plan of care as documented.  I have evaluated/treated an acute on chronic illness/injury that poses threat to life or bodily function and/or Chronic illness with severe exacerbation, or treatment of side effect in this encounter. I have performed a substantive portion of this shared visit, with more than 50% of the time spent in face-to-face and in direct patient care, including obtaining critical elements of the history, performing a physical exam, reviewing laboratory and radiological data, medical decision-making, coordinating patient care, discussing the plan of care with the patient, and documentation.  The above was discussed and reviewed with the THAD.      Patient seen and evaluated at bedside. His daughter was present for my examination.  She reports noticing progressive memory loss over the past couple years but that there was an abrupt change as

## 2024-05-17 PROBLEM — R21 RASH AND NONSPECIFIC SKIN ERUPTION: Status: ACTIVE | Noted: 2024-05-17

## 2024-05-17 LAB
GLUCOSE BLD-MCNC: 107 MG/DL (ref 70–99)
GLUCOSE BLD-MCNC: 130 MG/DL (ref 70–99)

## 2024-05-17 PROCEDURE — 6370000000 HC RX 637 (ALT 250 FOR IP): Performed by: NURSE PRACTITIONER

## 2024-05-17 PROCEDURE — 6370000000 HC RX 637 (ALT 250 FOR IP): Performed by: STUDENT IN AN ORGANIZED HEALTH CARE EDUCATION/TRAINING PROGRAM

## 2024-05-17 PROCEDURE — 99233 SBSQ HOSP IP/OBS HIGH 50: CPT | Performed by: NURSE PRACTITIONER

## 2024-05-17 PROCEDURE — 6370000000 HC RX 637 (ALT 250 FOR IP): Performed by: INTERNAL MEDICINE

## 2024-05-17 PROCEDURE — 2140000000 HC CCU INTERMEDIATE R&B

## 2024-05-17 PROCEDURE — 99222 1ST HOSP IP/OBS MODERATE 55: CPT | Performed by: INTERNAL MEDICINE

## 2024-05-17 PROCEDURE — 51798 US URINE CAPACITY MEASURE: CPT

## 2024-05-17 PROCEDURE — 82962 GLUCOSE BLOOD TEST: CPT

## 2024-05-17 PROCEDURE — 94761 N-INVAS EAR/PLS OXIMETRY MLT: CPT

## 2024-05-17 PROCEDURE — 6360000002 HC RX W HCPCS: Performed by: FAMILY MEDICINE

## 2024-05-17 RX ORDER — HYDROXYZINE HYDROCHLORIDE 50 MG/ML
50 INJECTION, SOLUTION INTRAMUSCULAR ONCE
Status: COMPLETED | OUTPATIENT
Start: 2024-05-17 | End: 2024-05-17

## 2024-05-17 RX ORDER — DIVALPROEX SODIUM 125 MG/1
125 CAPSULE, COATED PELLETS ORAL ONCE
Status: COMPLETED | OUTPATIENT
Start: 2024-05-17 | End: 2024-05-17

## 2024-05-17 RX ADMIN — DOCUSATE SODIUM 100 MG: 100 CAPSULE, LIQUID FILLED ORAL at 09:25

## 2024-05-17 RX ADMIN — POLYETHYLENE GLYCOL (3350) 17 G: 17 POWDER, FOR SOLUTION ORAL at 09:25

## 2024-05-17 RX ADMIN — PANTOPRAZOLE SODIUM 40 MG: 40 TABLET, DELAYED RELEASE ORAL at 06:15

## 2024-05-17 RX ADMIN — DIPHENHYDRAMINE HYDROCHLORIDE 25 MG: 25 TABLET ORAL at 09:25

## 2024-05-17 RX ADMIN — Medication 5000 UNITS: at 09:25

## 2024-05-17 RX ADMIN — ATORVASTATIN CALCIUM 80 MG: 40 TABLET, FILM COATED ORAL at 20:34

## 2024-05-17 RX ADMIN — MIDODRINE HYDROCHLORIDE 5 MG: 5 TABLET ORAL at 09:25

## 2024-05-17 RX ADMIN — DIVALPROEX SODIUM 125 MG: 125 CAPSULE ORAL at 16:08

## 2024-05-17 RX ADMIN — MICONAZOLE NITRATE: 2 POWDER TOPICAL at 20:37

## 2024-05-17 RX ADMIN — TAMSULOSIN HYDROCHLORIDE 0.4 MG: 0.4 CAPSULE ORAL at 20:33

## 2024-05-17 RX ADMIN — METOPROLOL TARTRATE 25 MG: 25 TABLET, FILM COATED ORAL at 20:34

## 2024-05-17 RX ADMIN — METOPROLOL TARTRATE 25 MG: 25 TABLET, FILM COATED ORAL at 09:25

## 2024-05-17 RX ADMIN — FUROSEMIDE 20 MG: 20 TABLET ORAL at 09:25

## 2024-05-17 RX ADMIN — DIVALPROEX SODIUM 500 MG: 125 CAPSULE, COATED PELLETS ORAL at 20:38

## 2024-05-17 RX ADMIN — HYDROXYZINE HYDROCHLORIDE 50 MG: 50 INJECTION, SOLUTION INTRAMUSCULAR at 00:31

## 2024-05-17 RX ADMIN — DIVALPROEX SODIUM 125 MG: 125 CAPSULE, COATED PELLETS ORAL at 09:25

## 2024-05-17 RX ADMIN — POTASSIUM BICARBONATE 20 MEQ: 782 TABLET, EFFERVESCENT ORAL at 09:25

## 2024-05-17 ASSESSMENT — PAIN SCALES - GENERAL: PAINLEVEL_OUTOF10: 0

## 2024-05-17 NOTE — PLAN OF CARE
Problem: Safety - Adult  Goal: Free from fall injury  Outcome: Progressing     Problem: Discharge Planning  Goal: Discharge to home or other facility with appropriate resources  Outcome: Progressing     Problem: Pain  Goal: Verbalizes/displays adequate comfort level or baseline comfort level  Outcome: Progressing     Problem: Skin/Tissue Integrity  Goal: Absence of new skin breakdown  Description: 1.  Monitor for areas of redness and/or skin breakdown  2.  Assess vascular access sites hourly  3.  Every 4-6 hours minimum:  Change oxygen saturation probe site  4.  Every 4-6 hours:  If on nasal continuous positive airway pressure, respiratory therapy assess nares and determine need for appliance change or resting period.  Outcome: Progressing     Problem: ABCDS Injury Assessment  Goal: Absence of physical injury  Outcome: Progressing     Problem: Chronic Conditions and Co-morbidities  Goal: Patient's chronic conditions and co-morbidity symptoms are monitored and maintained or improved  Outcome: Progressing     Problem: Nutrition Deficit:  Goal: Optimize nutritional status  Outcome: Progressing

## 2024-05-17 NOTE — PROGRESS NOTES
V2.0  [facility] [place]      Name:  Eliazar Ugarte /Age/Sex: 1940  (83 y.o. male)   MRN & CSN:  [id number] & [id number] Encounter Date/Time: 2024 12:25 PM EDT    Location:  [address] [place] [address]-A PCP: Roberta Schafer       [facility] Day: 11    Assessment and Plan:   Eliazar Ugarte is a 83 y.o. male  who presents with Acute encephalopathy    Patient is awaiting placement to SNF however needs to be sitter free for 24 hours to be discharged.  Patient is currently confused and is currently requiring sitter due to behavioral disturbances and danger to himself.    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  Pneumonia - ruled out  -Chest x-ray-right upper lobe suprahilar airspace disease   -Blood cultures drawn in ED negative  -Procalcitonin 0.199  -IV antibiotics: Vancomycin plus cefepime.  Will discontinue antibiotics as there is no evidence of any infectious etiology at present that would require continued antibiotic administration  -Follow clinically    #.  Strokelike symptoms - stroke ruled out  -Stroke alert called in ED  -Last well-known-5 PM  -NIHSS could not be done as patient is currently sleeping, not able to be kept awake.  -CT head-no acute intracranial abnormality  -CTA head and neck-atherosclerotic changes noted in the region of the carotid bifurcation bilaterally.  Left greater than the right with approximately 40% diameter stenosis and small focal adherent thrombus seen within the lumen of the proximal left internal carotid artery.  Right vertebral artery is occluded  distally.  -Patient was not deemed a TNK candidate as he is on Eliquis.  -NIHSS/PT/OT/SLP evaluation ordered  -MRI brain: No acute stroke  -Continue aspirin, statin  -Consult neurology and Neuro interventional, no indication for any acute intervention  -Angiogram 5/9/24     #.  SVT  -As per documentation, enroute to the hospital  -Received 6 mg, 12 mg of adenosine  -EKG on lzhahin-B-urx with RVR-.  -Consult cardiology.  -Continune Beta blocker     Urinary retention  Hematuria - resolved  Patient with urinary retention while Flomax was being held.  Wall catheter was placed.  Hematuria likely secondary to patient with Wall  Urology consulted  Voiding trial in 2 to 3 days once hematuria clears and patient is ambulatory  Follow-up as outpatient    #.  Abnormal troponin  -Troponin 93, 106  -EKG-no acute ST-T wave changes.  -Serial troponins, repeat EKG  -Consult cardiology. Elevated troponin likely in the setting of acute stroke and non-ACS in nature. Demand ischemia type II MI. No cardiac ischemic workup is planned      #.  JOSH on CKD - resolved  -Creatinine 1.1-2/2024,   -s/p Diuresis      #.  Fall  -CT head, CT C-spine-no acute process  -PT/OT evaluation when appropriate.     #.  Coronary artery disease s/p CABG as per documentation from nursing home     #.  BEBETO     #.  Congestive heart failure with acute exacerbation  -2D echo ordered  -Patient is on Lasix 20 mg daily  -Continue Lasix 40 mg daily and monitor     #.  Diabetes mellitus type 2  -Patient is on glipizide 5 mg.  -Continue insulin sliding scale with hypoglycemia protocol.     #.  Orthostatic hypotension as per documentation        #.  History of CVA as per documentation    Diet ADULT ORAL NUTRITION SUPPLEMENT; Breakfast, Lunch, Dinner; Diabetic Oral Supplement  ADULT DIET; Dysphagia - Soft and Bite Sized   DVT Prophylaxis [] Lovenox, []  Heparin, [] SCDs, [] Ambulation,    [] Eliquis, [] Xarelto  [] Coumadin [] other   Code Status Limited    Disposition From: [facility]   Expected Disposition: [facility]  Estimated Date of Discharge: 1-3 days  Patient requires continued admission due to iv Antibiotics and need to be sitter free to DC   Surrogate Decision Maker/ POA      Personally reviewed Lab Studies and Imaging     EKG interpreted personally and results no acute ST changes    Subjective:     Chief Complaint: Altered Mental Status, Fall, and Extremity Weakness     Patient seen and evaluated at bedside.  Sitter in place.  As per sitter patient has been yelling intermittently and has been confused.  Patient noted to be confused on my exam as well.    Review of Systems:    Review of Systems   Unable to perform ROS: Mental status change         Objective:     Intake/Output Summary (Last 24 hours) at 5/17/2024 0853  Last data filed at 5/16/2024 1600  Gross per 24 hour   Intake 125 ml   Output --   Net 125 ml          Vitals:   Vitals:    05/17/24 0442   BP: 123/64   Pulse: 57   Resp: 17   Temp: 97.3 °F (36.3 °C)   SpO2: 95%       Physical Exam:     Physical Exam  Constitutional:       General: He is not in acute distress.     Appearance: Normal appearance.   HENT:      Head: Normocephalic and atraumatic.      Nose: Nose normal.      Mouth/Throat:      Mouth: Mucous membranes are moist.      Pharynx: Oropharynx is clear.   Eyes:      Extraocular Movements: Extraocular movements intact.      Conjunctiva/sclera: Conjunctivae normal.      Pupils: Pupils are equal, round, and reactive to light.   Cardiovascular:      Rate and Rhythm: Normal rate and regular rhythm.      Pulses: Normal pulses.      Heart sounds: Normal heart sounds.   Pulmonary:      Effort: Pulmonary effort is normal.      Breath sounds: No rhonchi.   Abdominal:      General: Abdomen is flat. Bowel sounds are normal.      Palpations: Abdomen is soft.   Musculoskeletal:         General: Normal range of motion.      Cervical back: Normal range of motion and neck supple.       vertebral artery is occluded distally. Dominant left vertebral artery gives rise to the basilar artery and shows atherosclerotic calcifications both distally and at its origin. PROCEDURE: CT ANGIOGRAPHY HEAD WITH/WITHOUT CONTRAST INDICATION: ams facial droop COMPARISON: none TECHNIQUE: Axial CT imaging obtained through the head prior to and following administration of IV contrast. Axial images, multiplanar reformatted images, and maximum intensity projection images were reviewed for CT angiographic technique. IV contrast: 75 mL Isovue-370. FINDINGS: ANTERIOR CIRCULATION: The intracranial internal carotid arteries, anterior cerebral arteries, and middle cerebral arteries demonstrate no occlusion or stenosis. No evidence for aneurysm or arteriovenous malformation. POSTERIOR CIRCULATION: The distal left vertebral artery, basilar artery and posterior cerebral arteries demonstrate no occlusion or stenosis. No evidence for aneurysm or arteriovenous malformation. INTRACRANIAL VENOUS SYSTEM: There is no significant enhancement seen of the dural venous sinuses. This may be due to early phase of contrast but thrombus is not excluded. Refer to recent noncontrast CT of the head for additional results.. IMPRESSION: No significant intracranial arterial vascular pathology is identified. There is nonenhancement of the dural saphenous sinuses which may be due to early phase of contrast opacification but the possibility of thrombosis cannot be excluded on the basis of this exam. Electronically signed by Shine Ignacio MD    CT CERVICAL SPINE WO CONTRAST    Result Date: 5/7/2024  EXAM: CT CERVICAL SPINE WO CONTRAST INDICATION: Fall COMPARISON: None Technical factors: Non-contrast CT imaging was performed of the cervical spine. Axial and multiplanar reformatted images reviewed.   Individualized dose optimization technique was used in order to meet ALARA standards for radiation dose reduction.  In addition to vendor specific dose

## 2024-05-17 NOTE — CARE COORDINATION
Pt is from [facility]. Daughter wants pt to go skilled at [facility]. Dorothy/Ross is aware.  Pt has dementia and has a sitter.  Pt will require a pre-cert and will need to be sitter free 24 hrs prior to d/c.  D/c instructions are on front of packet located with the soft chart.   TE     DO NOT SEND PT TO SNF UNTIL A PRE-CERT IS OBTAINED AND HE IS SITTER FREE 24 HRS PRIOR TO D/C.   TO NOTIFY CM WHEN TO START THE PRE-CERT.  TE

## 2024-05-17 NOTE — PROGRESS NOTES
ATTEMPT  PT/OT attempt at bedside at 0954, pt is sleeping soundly and sitter reports pt just fell asleep. Sitter and RN report pt was combative this morning and request PT to hold at this time. PTA to f/u as schedule allows.   Electronically signed by:    Leeanna Faulkner, PTA  5/17/2024, 1:46 PM

## 2024-05-17 NOTE — CONSULTS
Infectious Disease Consult Note  2024   Patient Name: Eliazar Ugarte : 1940     Assessment  Back rash  Initially admitted for strokelike symptoms and treated with vancomycin and cefepime for pneumonia.  He is a poor historian due to neurocognitive disorder.   His RN described that he had a generalized body rash. It appears that this has resolved.   Unlikely to be drug reaction since it has resolved.   Comorbid conditions:  atrial fibrillation, type 2 diabetes mellitus, hyperlipidemia, hypertension    Plan  Therapeutic:  Abx not required  Diagnostic:  F/u:  Other: continue to monitor    Thank you for allowing me to consult in the care of this patient.  ------------------------  REASON FOR CONSULT: \"diffuse macular rash on back\"  Requested by: Paris Ng MD  History obtained from chart review, and RN as the patient is demented  HPI:Patient is a 83 y.o. male living with atrial fibrillation, type 2 diabetes mellitus, hyperlipidemia, hypertension, history of CVA, neurocognitive disorder who was admitted 2024 for further evaluation and management of confusion, fall, strokelike symptoms.  He had EKG tracing of SVT, JOSH on CKD, abnormal troponin.  Chest x-ray showed a right upper lobe suprahilar airspace disease and a clinical diagnosis of pneumonia was made.  Ceftriaxone and azithromycin were administered from  through .  MRSA nares was positive.  Vancomycin and cefepime were administered from  through .  He is awaiting discharge and requires a sitter due to behavioral disturbances and self danger.  It is reported that he has had diffuse pruritic macular rash for the past many days  ?  Infectious diseases service was consulted to evaluate the pt, and recommend further investigative and therapeutic measures.  Review and summary of old records:  ROS: Other systems reviewed Including eyes, ENT, respiratory, cardiovascular, GI, , dermatologic, neurologic, psych, hem/lymphatic, musculoskeletal

## 2024-05-17 NOTE — PROGRESS NOTES
Comprehensive Nutrition Assessment    Type and Reason for Visit:  Reassess    Nutrition Recommendations/Plan:   Continue current diet and oral nutrition supplement  Assist as needed     Malnutrition Assessment:  Malnutrition Status:  Moderate malnutrition (05/14/24 1428)    Context:  Acute Illness     Findings of the 6 clinical characteristics of malnutrition:  Energy Intake:  50% or less of estimated energy requirements for 5 or more days  Weight Loss:  1% to 2% over 1 week     Body Fat Loss:  Unable to assess     Muscle Mass Loss:  Unable to assess    Fluid Accumulation:  Unable to assess     Strength:  Not Performed    Nutrition Assessment:    Pt continues with acute encephalopathy, delirium due to medical, major neurocognitive disorder due to vascular changes, other sequelae of cerebrovascular disease. Has sitter, agitated, combative, non verbal. Needs assistance with feeding, po intake remains poor, consuming 1-25% of meals. Pt sleeping during visit. Will continue current oral supplement tid and follow as high nutrition risk.    Nutrition Related Findings:    Glu 113, A1c 8.5   Wound Type: None       Current Nutrition Intake & Therapies:    Average Meal Intake: 1-25%, 0%  Average Supplements Intake: Unable to assess  ADULT ORAL NUTRITION SUPPLEMENT; Breakfast, Lunch, Dinner; Diabetic Oral Supplement  ADULT DIET; Dysphagia - Soft and Bite Sized    Anthropometric Measures:  Height: 185.4 cm (6' 1\")  Ideal Body Weight (IBW): 184 lbs (84 kg)    Admission Body Weight: 102.4 kg (225 lb 12 oz)  Current Body Weight: 99.4 kg (219 lb 2.2 oz), 119.8 % IBW. Weight Source: Bed Scale  Current BMI (kg/m2): 28.9  Usual Body Weight: 88.8 kg (195 lb 12.3 oz) (2/1/23)  % Weight Change (Calculated): 12.6  Weight Adjustment For: No Adjustment                 BMI Categories: Overweight (BMI 25.0-29.9)    Estimated Daily Nutrient Needs:  Energy Requirements Based On: Formula  Weight Used for Energy Requirements: Current  Energy  (kcal/day): 2100-2275 ([facility])  Weight Used for Protein Requirements: Ideal  Protein (g/day):  (1-1.2 g/kg)  Method Used for Fluid Requirements: 1 ml/kcal  Fluid (ml/day): 2100-2275    Nutrition Diagnosis:   Moderate malnutrition, In context of acute illness or injury related to acute injury/trauma, cognitive or neurological impairment (reduced appetite s/s agitation, disorientation, and acute AMS) as evidenced by Criteria as identified in malnutrition assessment (meal intakes of <50% x 7 days, 2.3% wt loss in 1 week)    Nutrition Interventions:   Food and/or Nutrient Delivery: Continue Current Diet, Continue Oral Nutrition Supplement  Nutrition Education/Counseling: Education not appropriate (AMS)  Coordination of Nutrition Care: Continue to monitor while inpatient  Plan of Care discussed with: RN    Goals:  Previous Goal Met: Progressing toward Goal(s) (slowly)  Goals: PO intake 50% or greater       Nutrition Monitoring and Evaluation:   Behavioral-Environmental Outcomes: None Identified  Food/Nutrient Intake Outcomes: Food and Nutrient Intake, Supplement Intake, Vitamin/Mineral Intake, IVF Intake  Physical Signs/Symptoms Outcomes: Biochemical Data, Meal Time Behavior, Weight    Discharge Planning:    Continue Oral Nutrition Supplement     Tiki Quintanilla RD, LD  Contact: [phone]

## 2024-05-17 NOTE — PROGRESS NOTES
Psychiatric Progress Note    Eliazar Ugarte  [id number]  05/17/24    CHIEF COMPLAINT: AMS     HPI: Patient is an 83 year old male with pmhx of  a fib on eliquis, coronary artery disease s/p CABG, hypertension, diabetes mellitus type 2, BEBETO as per documentation, dementia, history of CVA, BPH, congestive heart failure, who was sent from the nursing home via EMS to [facility] ED for confusion, fall, strokelike symptoms on 5/7/24. [facility] tele neurology evaluated patient in ED with score of NIH 8 but intervention not recommended as patient is on anticoagulation. AMS due to multiple reasons including SVT and pneumonia as well as progression of dementia. Patient becoming more confused and agitated through hospital stay. MRI done and negative for acute stroke. Sitter and /or COMPA has been utilized.  Consults include cardiology, EP, neurology, interventional  neuro-Radiology, Urology.. Psychiatry consulted by Dr Ng due to \"AMS\"    Met with patient this am at bedside. Room is well lit, blinds up and television on. Sitter, and tech at bedside as they had just finished giving him morning bath. He is refusing to answer questions, has been non verbal with them. He is agitated, pulling depends off, grabbing at staff and just upset. Eye intensity is intense. EKG attempted but patient was restless and unable to hold still. Insight and judgment are impaired.    Medications Prior to Admission: glipiZIDE (GLUCOTROL) 5 MG tablet, Take 1 tablet by mouth 2 times daily (before meals)  furosemide (LASIX) 20 MG tablet, Take 1 tablet by mouth daily  potassium chloride (KLOR-CON) 20 MEQ packet, Take 20 mEq by mouth daily  carboxymethylcellulose 1 % ophthalmic solution, Place 1 drop into both eyes 3 times daily  Cholecalciferol (VITAMIN D3) 125 MCG (5000 UT) TABS, Take by mouth  divalproex (DEPAKOTE) 125 MG DR tablet, Take 1 tablet by mouth daily  divalproex (DEPAKOTE) 500 MG DR tablet, Take 1 tablet by mouth at bedtime  melatonin 3 MG TABS tablet, Take  premature ventricular or aberrantly conducted complexes  Right bundle branch block  Left anterior fascicular block    Bifascicular block    Abnormal ECG  When compared with ECG of 09-MAY-2024 15:08,  T wave inversion no longer evident in Lateral leads  Confirmed by MAURILIO CHIN MD ([id number]) on 5/15/2024 6:54:45 PM     Valproic Acid Level, Total    Collection Time: 05/15/24 12:13 PM   Result Value Ref Range    Valproic Acid Lvl 15.0 (L) 50 - 100 UG/ML    DOSE AMOUNT DOSE AMT. GIVEN - 625     DOSE TIME DOSE TIME GIVEN - 0900 and 2100    POCT Glucose    Collection Time: 05/15/24  4:50 PM   Result Value Ref Range    POC Glucose 140 (H) 70 - 99 MG/DL   POCT Glucose    Collection Time: 05/15/24  8:24 PM   Result Value Ref Range    POC Glucose 110 (H) 70 - 99 MG/DL   POCT Glucose    Collection Time: 05/16/24  8:08 AM   Result Value Ref Range    POC Glucose 107 (H) 70 - 99 MG/DL   POCT Glucose    Collection Time: 05/16/24 12:14 PM   Result Value Ref Range    POC Glucose 116 (H) 70 - 99 MG/DL       PSYCHIATRIC ASSESSMENT / MENTAL STATUS EXAM:   Vitals: Blood pressure 128/67, pulse 69, temperature 97.3 °F (36.3 °C), temperature source Axillary, resp. rate 17, height 1.854 m (6' 1\"), weight 99.4 kg (219 lb 2.2 oz), SpO2 (!) 89 %.  CONSTITUTIONAL:    Appearance: appears stated age. alert and oriented to person, disoriented to place, time & situation. no acute distress. Adequate grooming and hygeine. Intense eye contact. No prominent physical abnormalities.  Attitude: Manner is not cooperative and confused  Motor: Noted mild psychomotor agitation, retardation or abnormal movements noted  Speech: ANTHONY patient refusing to speak today Clearly articulated-   normal rate, volume, tone & amount.  Language: ? intact understanding and production  Mood: ANTHONY  Affect: agitated, upset, non-labile, congruent with mood and content of speech  Thought Production: Spontaneous.  Thought Form: Coherent, not linear, logical but goal-directed. No

## 2024-05-17 NOTE — PROGRESS NOTES
Unable to complete ordered 12- lead EKG.  Patient would not lay still and would pull off EKG stickers

## 2024-05-17 NOTE — PROGRESS NOTES
Straight cath done at this time. Patient returned 500ml of dark urine. Tolerated the procedure well. No issues.     Paulette Beltran RN

## 2024-05-18 LAB
GLUCOSE BLD-MCNC: 119 MG/DL (ref 70–99)
GLUCOSE BLD-MCNC: 90 MG/DL (ref 70–99)
GLUCOSE BLD-MCNC: 99 MG/DL (ref 70–99)

## 2024-05-18 PROCEDURE — 6370000000 HC RX 637 (ALT 250 FOR IP): Performed by: STUDENT IN AN ORGANIZED HEALTH CARE EDUCATION/TRAINING PROGRAM

## 2024-05-18 PROCEDURE — 6370000000 HC RX 637 (ALT 250 FOR IP): Performed by: INTERNAL MEDICINE

## 2024-05-18 PROCEDURE — 94761 N-INVAS EAR/PLS OXIMETRY MLT: CPT

## 2024-05-18 PROCEDURE — 6370000000 HC RX 637 (ALT 250 FOR IP): Performed by: NURSE PRACTITIONER

## 2024-05-18 PROCEDURE — 82962 GLUCOSE BLOOD TEST: CPT

## 2024-05-18 PROCEDURE — 1200000000 HC SEMI PRIVATE

## 2024-05-18 RX ADMIN — ACETAMINOPHEN 650 MG: 325 TABLET ORAL at 02:25

## 2024-05-18 RX ADMIN — DIVALPROEX SODIUM 125 MG: 125 CAPSULE, COATED PELLETS ORAL at 09:50

## 2024-05-18 RX ADMIN — VITAMIN B COMPLEX 1 CAPSULE: at 09:50

## 2024-05-18 RX ADMIN — MICONAZOLE NITRATE: 2 POWDER TOPICAL at 09:00

## 2024-05-18 RX ADMIN — TAMSULOSIN HYDROCHLORIDE 0.4 MG: 0.4 CAPSULE ORAL at 21:03

## 2024-05-18 RX ADMIN — DOCUSATE SODIUM 100 MG: 100 CAPSULE, LIQUID FILLED ORAL at 09:46

## 2024-05-18 RX ADMIN — DIVALPROEX SODIUM 500 MG: 125 CAPSULE, COATED PELLETS ORAL at 21:02

## 2024-05-18 RX ADMIN — MIDODRINE HYDROCHLORIDE 5 MG: 5 TABLET ORAL at 09:46

## 2024-05-18 RX ADMIN — ATORVASTATIN CALCIUM 80 MG: 40 TABLET, FILM COATED ORAL at 21:03

## 2024-05-18 RX ADMIN — CARBOXYMETHYLCELLULOSE SODIUM 1 DROP: 10 GEL OPHTHALMIC at 21:07

## 2024-05-18 RX ADMIN — METOPROLOL TARTRATE 25 MG: 25 TABLET, FILM COATED ORAL at 21:03

## 2024-05-18 RX ADMIN — MICONAZOLE NITRATE: 2 POWDER TOPICAL at 21:06

## 2024-05-18 RX ADMIN — FUROSEMIDE 20 MG: 20 TABLET ORAL at 09:46

## 2024-05-18 RX ADMIN — METOPROLOL TARTRATE 25 MG: 25 TABLET, FILM COATED ORAL at 09:46

## 2024-05-18 RX ADMIN — Medication 5000 UNITS: at 09:46

## 2024-05-18 RX ADMIN — POTASSIUM BICARBONATE 20 MEQ: 782 TABLET, EFFERVESCENT ORAL at 09:47

## 2024-05-18 ASSESSMENT — PAIN SCALES - GENERAL
PAINLEVEL_OUTOF10: 4
PAINLEVEL_OUTOF10: 0

## 2024-05-18 ASSESSMENT — PAIN DESCRIPTION - LOCATION: LOCATION: SHOULDER

## 2024-05-18 ASSESSMENT — PAIN DESCRIPTION - DESCRIPTORS: DESCRIPTORS: ACHING

## 2024-05-18 ASSESSMENT — PAIN DESCRIPTION - ORIENTATION: ORIENTATION: RIGHT

## 2024-05-18 ASSESSMENT — PAIN SCALES - WONG BAKER: WONGBAKER_NUMERICALRESPONSE: NO HURT

## 2024-05-18 NOTE — PROGRESS NOTES
V2.0  [facility] [place] Hospitalist Progress Note      Name:  Eliazar Ugarte /Age/Sex: 1940  (83 y.o. male)   MRN & CSN:  [id number] & [id number] Encounter Date/Time: 2024 12:25 PM EDT    Location:  [address] [place] [address]- PCP: Roberta Schafer       [facility] Day: 12    Assessment and Plan:   Eliazar Ugarte is a 83 y.o. male  who presents with Acute encephalopathy    Patient is awaiting placement to SNF however needs to be sitter free for 24 hours to be discharged.  Patient is currently confused and is currently requiring sitter due to behavioral disturbances and danger to himself.    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  Pneumonia - ruled out  -Chest x-ray-right upper lobe suprahilar airspace disease   -Blood cultures drawn in ED negative  -Procalcitonin 0.199  -IV antibiotics: Vancomycin plus cefepime.  Will discontinue antibiotics as there is no evidence of any infectious etiology at present that would require continued antibiotic administration  -Follow clinically    #.  Strokelike symptoms - stroke ruled out  -Stroke alert called in ED  -Last well-known-5 PM  -NIHSS could not be done as patient is currently sleeping, not able to be kept awake.  -CT head-no acute intracranial abnormality  -CTA head and neck-atherosclerotic changes noted in the region of the carotid bifurcation bilaterally.  Left greater than the right with approximately 40% diameter stenosis and small focal adherent thrombus seen within the lumen of the proximal left internal carotid artery.  Right  include automated exposure control, adjustment of the mA and/or kV according to patient size, and use of iterative reconstruction technique. Contrast: None FINDINGS: No evidence of acute fracture or traumatic abnormality is seen. Multilevel spondylotic changes seen with uncovertebral spurring at multiple levels noted bilaterally throughout the cervical spine. If concern discogenic disease, MRI correlation would be recommended.     No fracture seen. Electronically signed by Shine Ignacio MD    CT HEAD WO CONTRAST    Result Date: 5/7/2024  CT HEAD WITHOUT CONTRAST HISTORY: Stroke COMPARISON: 1/27/2023 FINDINGS: Age-related atrophy is seen with periventricular low-attenuation seen diffusely consistent with chronic ischemic myelopathy. There is no evidence of intracranial hemorrhage or abnormal extra-axial fluid collection. There are no definite signs to suggest acute infarction. However, if there is concern of early infarction or other subtle intracranial abnormality, MRI correlation should be considered. Because of thickening is seen in the bilateral ethmoid air cells.     No evidence of acute intracranial abnormality. Electronically signed by Shine Ignacio MD      Comment: Please note this report has been produced using speech recognition software and may contain errors related to that system including errors in grammar, punctuation, and spelling, as well as words and phrases that may be inappropriate. If there are any questions or concerns please feel free to contact the dictating provider for clarification.     Electronically signed by Paris Ng MD on 5/18/2024 at 9:23 AM

## 2024-05-18 NOTE — PLAN OF CARE
Problem: Safety - Adult  Goal: Free from fall injury  Outcome: Progressing     Problem: Discharge Planning  Goal: Discharge to home or other facility with appropriate resources  Outcome: Progressing     Problem: Pain  Goal: Verbalizes/displays adequate comfort level or baseline comfort level  Outcome: Progressing     Problem: Skin/Tissue Integrity  Goal: Absence of new skin breakdown  Description: 1.  Monitor for areas of redness and/or skin breakdown  2.  Assess vascular access sites hourly  3.  Every 4-6 hours minimum:  Change oxygen saturation probe site  4.  Every 4-6 hours:  If on nasal continuous positive airway pressure, respiratory therapy assess nares and determine need for appliance change or resting period.  Outcome: Progressing     Problem: Chronic Conditions and Co-morbidities  Goal: Patient's chronic conditions and co-morbidity symptoms are monitored and maintained or improved  Outcome: Progressing     Problem: Nutrition Deficit:  Goal: Optimize nutritional status  Outcome: Progressing

## 2024-05-18 NOTE — PROGRESS NOTES
Occupational Therapy Treatment Note    Name: Eliazar Ugarte MRN: [id number] :   1940   Date:  2024   Admission Date: 2024 Room:  [address] [place] [address]-A     Attempted to see pt this afternoon but sister and sitter present. They stated he's been a little agitate last hour and just got him back in bed and presently sleeping.      Electronically signed by:    SIMEON Dorsey,   2024, 1:09 PM

## 2024-05-19 LAB
GLUCOSE BLD-MCNC: 114 MG/DL (ref 70–99)
GLUCOSE BLD-MCNC: 122 MG/DL (ref 70–99)
GLUCOSE BLD-MCNC: 141 MG/DL (ref 70–99)
GLUCOSE BLD-MCNC: 164 MG/DL (ref 70–99)

## 2024-05-19 PROCEDURE — 94761 N-INVAS EAR/PLS OXIMETRY MLT: CPT

## 2024-05-19 PROCEDURE — 6370000000 HC RX 637 (ALT 250 FOR IP): Performed by: STUDENT IN AN ORGANIZED HEALTH CARE EDUCATION/TRAINING PROGRAM

## 2024-05-19 PROCEDURE — 82962 GLUCOSE BLOOD TEST: CPT

## 2024-05-19 PROCEDURE — 6370000000 HC RX 637 (ALT 250 FOR IP): Performed by: NURSE PRACTITIONER

## 2024-05-19 PROCEDURE — 2500000003 HC RX 250 WO HCPCS: Performed by: STUDENT IN AN ORGANIZED HEALTH CARE EDUCATION/TRAINING PROGRAM

## 2024-05-19 PROCEDURE — 6370000000 HC RX 637 (ALT 250 FOR IP): Performed by: INTERNAL MEDICINE

## 2024-05-19 PROCEDURE — 97530 THERAPEUTIC ACTIVITIES: CPT

## 2024-05-19 PROCEDURE — 51798 US URINE CAPACITY MEASURE: CPT

## 2024-05-19 PROCEDURE — 1200000000 HC SEMI PRIVATE

## 2024-05-19 RX ADMIN — CARBOXYMETHYLCELLULOSE SODIUM 1 DROP: 10 GEL OPHTHALMIC at 22:01

## 2024-05-19 RX ADMIN — DOCUSATE SODIUM 100 MG: 100 CAPSULE, LIQUID FILLED ORAL at 08:52

## 2024-05-19 RX ADMIN — POTASSIUM BICARBONATE 20 MEQ: 782 TABLET, EFFERVESCENT ORAL at 08:52

## 2024-05-19 RX ADMIN — ATORVASTATIN CALCIUM 80 MG: 40 TABLET, FILM COATED ORAL at 21:18

## 2024-05-19 RX ADMIN — CARBOXYMETHYLCELLULOSE SODIUM 1 DROP: 10 GEL OPHTHALMIC at 08:55

## 2024-05-19 RX ADMIN — METOPROLOL TARTRATE 25 MG: 25 TABLET, FILM COATED ORAL at 21:17

## 2024-05-19 RX ADMIN — POLYETHYLENE GLYCOL (3350) 17 G: 17 POWDER, FOR SOLUTION ORAL at 08:51

## 2024-05-19 RX ADMIN — MICONAZOLE NITRATE: 2 POWDER TOPICAL at 21:50

## 2024-05-19 RX ADMIN — TAMSULOSIN HYDROCHLORIDE 0.4 MG: 0.4 CAPSULE ORAL at 21:18

## 2024-05-19 RX ADMIN — Medication 6 MG: at 21:18

## 2024-05-19 RX ADMIN — Medication 5000 UNITS: at 08:52

## 2024-05-19 RX ADMIN — VITAMIN B COMPLEX 1 CAPSULE: at 08:53

## 2024-05-19 RX ADMIN — FUROSEMIDE 20 MG: 20 TABLET ORAL at 08:52

## 2024-05-19 RX ADMIN — APIXABAN 5 MG: 5 TABLET, FILM COATED ORAL at 21:18

## 2024-05-19 RX ADMIN — PANTOPRAZOLE SODIUM 40 MG: 40 TABLET, DELAYED RELEASE ORAL at 05:47

## 2024-05-19 RX ADMIN — METOPROLOL TARTRATE 25 MG: 25 TABLET, FILM COATED ORAL at 08:53

## 2024-05-19 RX ADMIN — DIVALPROEX SODIUM 125 MG: 125 CAPSULE, COATED PELLETS ORAL at 08:52

## 2024-05-19 RX ADMIN — DIVALPROEX SODIUM 500 MG: 125 CAPSULE, COATED PELLETS ORAL at 21:18

## 2024-05-19 RX ADMIN — MIDODRINE HYDROCHLORIDE 5 MG: 5 TABLET ORAL at 08:52

## 2024-05-19 ASSESSMENT — PAIN SCALES - WONG BAKER
WONGBAKER_NUMERICALRESPONSE: NO HURT

## 2024-05-19 ASSESSMENT — PAIN SCALES - GENERAL
PAINLEVEL_OUTOF10: 0

## 2024-05-19 NOTE — PROGRESS NOTES
Physical Therapy    Physical Therapy Treatment Note  Name: Eliazar Ugarte MRN: [id number] :   1940   Date:  2024   Admission Date: 2024 Room:  [address] [place] [address]   Restrictions/Precautions:          fall risk, gen prec  Communication with other providers:  nurse states pt ok to treat  Subjective:  Patient states:  agreeable  Pain:   Location, Type, Intensity (0/10 to 10/10):  does not rate    Objective:    Observation:  in bed. Sitter present.  Treatment, including education/measures:  Bed mob sup>sit requiring modA x1  Vc for sequencing and initiation  STS and SPT /c min-modAx1; walker guidance and vc for hand placement and safe techs  Pt needs several cues to initiate intervention  Stand balance during functional tasks x~1' F- grade.  Gait training /c FWW x10', 10' /c Sumaya and walker guidance, cues throughout for safety, motor planning, sequencing.   Safety  Patient left safely in the chair, left in care of sitter. Gait belt was used for transfers and gait.     Assessment / Impression:    Patient's tolerance of treatment:  good   Adverse Reaction: na  Significant change in status and impact:  na  Barriers to improvement:  weakness, cognition, endurance  Plan for Next Session:    Cont gait training and transfer training   Time in:  1055  Time out:  1112  Timed treatment minutes:  17  Total treatment time:  17    Previously filed items:  Social/Functional History  Lives With: Alone  Type of Home: Assisted living ([facility])  Home Layout: One level  Home Access: Level entry  Home Equipment: Walker - Rolling  ADL Assistance: Independent  Homemaking Assistance: Needs assistance  Homemaking Responsibilities: Yes  Ambulation Assistance: Independent  Transfer Assistance: Independent  Active : No        Short Term Goals  Short Term Goal 1: Pt will ambulate 100' with RW and SUP  Short Term Goal 2: Pt will perform supine to sit with SUP  Short Term Goal 3: Pt will perform sit to stand with  SUP    Electronically signed by:    German Bach, PTA  5/19/2024, 12:19 PM

## 2024-05-19 NOTE — PROGRESS NOTES
Called report to Jen at noon, just took pt down to 1104. Son in the room, so let him know of room transfer. Pt tried to urinate, but was unsuccessful, bladder scan was 224 at 1200.  Will try again later. Sitter went down with pt.

## 2024-05-19 NOTE — PROGRESS NOTES
V2.0  [facility] Progress Note      Name:  Eliazar Ugarte /Age/Sex: 1940  (83 y.o. male)   MRN & CSN:  [id number] & [id number] Encounter Date/Time: 2024 12:25 PM EDT    Location:  [address] [place] [address]-A PCP: Roberta Schafer       [facility] Day: 13    Assessment and Plan:   Eliazar Ugarte is a 83 y.o. male  who presents with Acute encephalopathy    Patient is awaiting placement to SNF however needs to be sitter free for 24 hours to be discharged.  Patient is currently confused and is currently requiring sitter due to behavioral disturbances and danger to himself.    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  Pneumonia - ruled out  -Chest x-ray-right upper lobe suprahilar airspace disease   -Blood cultures drawn in ED negative  -Procalcitonin 0.199  -IV antibiotics: Vancomycin plus cefepime.  Will discontinue antibiotics as there is no evidence of any infectious etiology at present that would require continued antibiotic administration  -Follow clinically    #.  Strokelike symptoms - stroke ruled out  -Stroke alert called in ED  -Last well-known-5 PM  -NIHSS could not be done as patient is currently sleeping, not able to be kept awake.  -CT head-no acute intracranial abnormality  -CTA head and neck-atherosclerotic changes noted in the region of the carotid bifurcation bilaterally.  Left greater than the right with approximately 40% diameter stenosis and small focal adherent thrombus seen within the lumen of the proximal left internal carotid artery.  Right  cervical angiogram was done which shows contrast flowing antegrade direction to the common internal and external carotid arteries.  There are atherosclerotic changes at the bifurcation producing a 20 to 30% stenosis no thrombus or flow-limiting lesion. Next a cerebral angiogram was done from that location which shows contrast flowing in antegrade direction to the intracranial portion of the internal carotid artery filling the middle cerebral artery and anterior cerebral artery.  Mild atherosclerotic changes are identified in the intracranial internal carotid artery without any significant stenosis.  Capillary and venous phases are within normal limits. Next the catheter was removed from the body.  A right common femoral artery angiogram was done which shows the arterial stick above the bifurcation and below the inguinal ligament.  There is no significant stenosis.  Angio-Seal was used for hemostasis.  There is no hematoma and pulses were intact. Summary: 70% irregular stenosis of the proximal portion of the right cervical vertebral artery.  Flow is delayed from this lesion.  There is a second stenosis in the V3 segment of 50%.  There is adequate flow from the left vertebral artery. 50% stenosis at the origin of the left vertebral artery. Atherosclerotic changes identified in the bilateral posterior cerebral arteries. 20 to 30% stenosis at the proximal portion of the left internal carotid artery without any flow-limiting lesion or identifiable thrombus. Diffuse atherosclerotic changes. No intervention recommended. Electronically signed by Ronel Magdaleno DO on 5/9/2024 at 11:24 AM     MRI brain without contrast    Result Date: 5/8/2024  Reason: Possible stroke MRI brain without contrast Technique: T1, T2, diffusion-weighted axial, T1 sagittal sequences received for interpretation. FINDINGS: On diffusion-weighted sequences, no abnormal signals identified. On FLAIR sequences, moderate periventricular increased signals  vertebral artery is occluded distally. Dominant left vertebral artery gives rise to the basilar artery and shows atherosclerotic calcifications both distally and at its origin. PROCEDURE: CT ANGIOGRAPHY HEAD WITH/WITHOUT CONTRAST INDICATION: ams facial droop COMPARISON: none TECHNIQUE: Axial CT imaging obtained through the head prior to and following administration of IV contrast. Axial images, multiplanar reformatted images, and maximum intensity projection images were reviewed for CT angiographic technique. IV contrast: 75 mL Isovue-370. FINDINGS: ANTERIOR CIRCULATION: The intracranial internal carotid arteries, anterior cerebral arteries, and middle cerebral arteries demonstrate no occlusion or stenosis. No evidence for aneurysm or arteriovenous malformation. POSTERIOR CIRCULATION: The distal left vertebral artery, basilar artery and posterior cerebral arteries demonstrate no occlusion or stenosis. No evidence for aneurysm or arteriovenous malformation. INTRACRANIAL VENOUS SYSTEM: There is no significant enhancement seen of the dural venous sinuses. This may be due to early phase of contrast but thrombus is not excluded. Refer to recent noncontrast CT of the head for additional results.. IMPRESSION: No significant intracranial arterial vascular pathology is identified. There is nonenhancement of the dural saphenous sinuses which may be due to early phase of contrast opacification but the possibility of thrombosis cannot be excluded on the basis of this exam. Electronically signed by Shine Ignacio MD    CT CERVICAL SPINE WO CONTRAST    Result Date: 5/7/2024  EXAM: CT CERVICAL SPINE WO CONTRAST INDICATION: Fall COMPARISON: None Technical factors: Non-contrast CT imaging was performed of the cervical spine. Axial and multiplanar reformatted images reviewed.   Individualized dose optimization technique was used in order to meet ALARA standards for radiation dose reduction.  In addition to vendor specific dose

## 2024-05-19 NOTE — PROGRESS NOTES
4 Eyes Skin Assessment     NAME:  Eliazar Ugarte  YOB: 1940  MEDICAL RECORD NUMBER:  [id number]    The patient is being assessed for  Transfer to New Unit    I agree that at least one RN has performed a thorough Head to Toe Skin Assessment on the patient. ALL assessment sites listed below have been assessed.      Areas assessed by both nurses:    Head, Face, Ears, Shoulders, Back, Chest, Arms, Elbows, Hands, Sacrum. Buttock, Coccyx, Ischium, and Legs. Feet and Heels        Does the Patient have a Wound? Yes wound(s) were present on assessment. LDA wound assessment was Initiated and completed by RN       Garry Prevention initiated by RN: Yes  Wound Care Orders initiated by RN: Yes    Pressure Injury (Stage 3,4, Unstageable, DTI, NWPT, and Complex wounds) if present, place Wound referral order by RN under : Yes    New Ostomies, if present place, Ostomy referral order under : No     Nurse 1 eSignature: Electronically signed by Nellie Dasilva RN on 5/19/24 at 6:06 PM EDT    **SHARE this note so that the co-signing nurse can place an eSignature**    Nurse 2 eSignature: Electronically signed by Jen Castle LPN on 5/19/24 at 6:21 PM EDT

## 2024-05-19 NOTE — PLAN OF CARE
Problem: Safety - Adult  Goal: Free from fall injury  Outcome: Progressing     Problem: Discharge Planning  Goal: Discharge to home or other facility with appropriate resources  Outcome: Progressing  Flowsheets (Taken 5/19/2024 0800)  Discharge to home or other facility with appropriate resources:   Identify barriers to discharge with patient and caregiver   Arrange for needed discharge resources and transportation as appropriate   Identify discharge learning needs (meds, wound care, etc)   Arrange for interpreters to assist at discharge as needed   Refer to discharge planning if patient needs post-hospital services based on physician order or complex needs related to functional status, cognitive ability or social support system     Problem: Pain  Goal: Verbalizes/displays adequate comfort level or baseline comfort level  Outcome: Progressing     Problem: Skin/Tissue Integrity  Goal: Absence of new skin breakdown  Description: 1.  Monitor for areas of redness and/or skin breakdown  2.  Assess vascular access sites hourly  3.  Every 4-6 hours minimum:  Change oxygen saturation probe site  4.  Every 4-6 hours:  If on nasal continuous positive airway pressure, respiratory therapy assess nares and determine need for appliance change or resting period.  Outcome: Progressing     Problem: ABCDS Injury Assessment  Goal: Absence of physical injury  Outcome: Progressing     Problem: Chronic Conditions and Co-morbidities  Goal: Patient's chronic conditions and co-morbidity symptoms are monitored and maintained or improved  Outcome: Progressing  Flowsheets (Taken 5/19/2024 0800)  Care Plan - Patient's Chronic Conditions and Co-Morbidity Symptoms are Monitored and Maintained or Improved:   Monitor and assess patient's chronic conditions and comorbid symptoms for stability, deterioration, or improvement   Collaborate with multidisciplinary team to address chronic and comorbid conditions and prevent exacerbation or

## 2024-05-20 PROCEDURE — 97530 THERAPEUTIC ACTIVITIES: CPT

## 2024-05-20 PROCEDURE — 99233 SBSQ HOSP IP/OBS HIGH 50: CPT | Performed by: NURSE PRACTITIONER

## 2024-05-20 PROCEDURE — 6370000000 HC RX 637 (ALT 250 FOR IP): Performed by: STUDENT IN AN ORGANIZED HEALTH CARE EDUCATION/TRAINING PROGRAM

## 2024-05-20 PROCEDURE — 6370000000 HC RX 637 (ALT 250 FOR IP): Performed by: INTERNAL MEDICINE

## 2024-05-20 PROCEDURE — 2500000003 HC RX 250 WO HCPCS: Performed by: STUDENT IN AN ORGANIZED HEALTH CARE EDUCATION/TRAINING PROGRAM

## 2024-05-20 PROCEDURE — 99231 SBSQ HOSP IP/OBS SF/LOW 25: CPT | Performed by: INTERNAL MEDICINE

## 2024-05-20 PROCEDURE — 97116 GAIT TRAINING THERAPY: CPT

## 2024-05-20 PROCEDURE — 6370000000 HC RX 637 (ALT 250 FOR IP): Performed by: NURSE PRACTITIONER

## 2024-05-20 PROCEDURE — 97535 SELF CARE MNGMENT TRAINING: CPT

## 2024-05-20 PROCEDURE — 93005 ELECTROCARDIOGRAM TRACING: CPT | Performed by: NURSE PRACTITIONER

## 2024-05-20 PROCEDURE — 94761 N-INVAS EAR/PLS OXIMETRY MLT: CPT

## 2024-05-20 PROCEDURE — 1200000000 HC SEMI PRIVATE

## 2024-05-20 RX ORDER — DIVALPROEX SODIUM 125 MG/1
250 CAPSULE, COATED PELLETS ORAL DAILY
Status: DISCONTINUED | OUTPATIENT
Start: 2024-05-21 | End: 2024-05-21

## 2024-05-20 RX ORDER — HYDROXYZINE HYDROCHLORIDE 50 MG/ML
50 INJECTION, SOLUTION INTRAMUSCULAR ONCE
Status: COMPLETED | OUTPATIENT
Start: 2024-05-21 | End: 2024-05-21

## 2024-05-20 RX ORDER — DIVALPROEX SODIUM 125 MG/1
125 CAPSULE, COATED PELLETS ORAL ONCE
Status: COMPLETED | OUTPATIENT
Start: 2024-05-20 | End: 2024-05-20

## 2024-05-20 RX ADMIN — METOPROLOL TARTRATE 25 MG: 25 TABLET, FILM COATED ORAL at 11:08

## 2024-05-20 RX ADMIN — POTASSIUM BICARBONATE 20 MEQ: 782 TABLET, EFFERVESCENT ORAL at 11:09

## 2024-05-20 RX ADMIN — POLYETHYLENE GLYCOL (3350) 17 G: 17 POWDER, FOR SOLUTION ORAL at 11:22

## 2024-05-20 RX ADMIN — CARBOXYMETHYLCELLULOSE SODIUM 1 DROP: 10 GEL OPHTHALMIC at 14:52

## 2024-05-20 RX ADMIN — MICONAZOLE NITRATE: 2 POWDER TOPICAL at 20:17

## 2024-05-20 RX ADMIN — FUROSEMIDE 20 MG: 20 TABLET ORAL at 11:08

## 2024-05-20 RX ADMIN — Medication 5000 UNITS: at 11:08

## 2024-05-20 RX ADMIN — APIXABAN 5 MG: 5 TABLET, FILM COATED ORAL at 11:08

## 2024-05-20 RX ADMIN — DIVALPROEX SODIUM 125 MG: 125 CAPSULE, COATED PELLETS ORAL at 18:03

## 2024-05-20 RX ADMIN — VITAMIN B COMPLEX 1 CAPSULE: at 11:08

## 2024-05-20 RX ADMIN — DIVALPROEX SODIUM 125 MG: 125 CAPSULE, COATED PELLETS ORAL at 11:08

## 2024-05-20 RX ADMIN — Medication 6 MG: at 20:09

## 2024-05-20 RX ADMIN — APIXABAN 5 MG: 5 TABLET, FILM COATED ORAL at 20:09

## 2024-05-20 RX ADMIN — ASPIRIN 81 MG: 81 TABLET, CHEWABLE ORAL at 11:08

## 2024-05-20 RX ADMIN — DOCUSATE SODIUM 100 MG: 100 CAPSULE, LIQUID FILLED ORAL at 11:08

## 2024-05-20 RX ADMIN — TAMSULOSIN HYDROCHLORIDE 0.4 MG: 0.4 CAPSULE ORAL at 20:09

## 2024-05-20 RX ADMIN — MIDODRINE HYDROCHLORIDE 5 MG: 5 TABLET ORAL at 11:08

## 2024-05-20 RX ADMIN — MICONAZOLE NITRATE: 2 POWDER TOPICAL at 11:22

## 2024-05-20 RX ADMIN — DIVALPROEX SODIUM 500 MG: 125 CAPSULE, COATED PELLETS ORAL at 20:09

## 2024-05-20 RX ADMIN — METOPROLOL TARTRATE 25 MG: 25 TABLET, FILM COATED ORAL at 20:09

## 2024-05-20 RX ADMIN — ATORVASTATIN CALCIUM 80 MG: 40 TABLET, FILM COATED ORAL at 20:09

## 2024-05-20 RX ADMIN — CARBOXYMETHYLCELLULOSE SODIUM 1 DROP: 10 GEL OPHTHALMIC at 11:22

## 2024-05-20 ASSESSMENT — PAIN DESCRIPTION - LOCATION: LOCATION: FOOT

## 2024-05-20 ASSESSMENT — PAIN SCALES - WONG BAKER
WONGBAKER_NUMERICALRESPONSE: NO HURT
WONGBAKER_NUMERICALRESPONSE: NO HURT

## 2024-05-20 ASSESSMENT — PAIN SCALES - GENERAL
PAINLEVEL_OUTOF10: 8
PAINLEVEL_OUTOF10: 0
PAINLEVEL_OUTOF10: 0

## 2024-05-20 ASSESSMENT — PAIN DESCRIPTION - DESCRIPTORS: DESCRIPTORS: SHARP

## 2024-05-20 ASSESSMENT — PAIN DESCRIPTION - ORIENTATION: ORIENTATION: LEFT

## 2024-05-20 NOTE — ADT AUTH CERT
Patient Demographics    Name Patient ID SSN Gender Identity Birth Date   Eliazar Ugarte [id number]  Male [birth year] (83 yrs)     Address Phone Email Employer    [address] [id number] [phone] (H)  [phone] (M) -- RETIRED      County Race Occupation Emp Status    -- White (non-) -- Retired      Reg Status PCP Date Last Verified Next Review Date    Verified Roberta Schafer 24      Admission Date Discharge Date Admitting Provider     24 -- Gabe Call MD       Marital Status Restorationism       [facility]        Emergency Contact 1   Luana Cope (C)  [phone] (H)     Physician Progress Notes  Notes from 24 through 24  Progress Notes by Paris Ng MD at 2024  9:47 AM    Author: Paris Ng MD Service: Internal Medicine Author Type: Physician   Filed: 2024  9:49 AM Date of Service: 2024  9:47 AM Status: Addendum   : Paris Ng MD (Physician)   Related Notes: Original Note by Paris Ng MD (Physician) filed at 2024  9:47 AM   Expand All Collapse All       V2.0  [facility] Hospitalist Progress Note                            Name:  Eliazar Ugarte /Age/Sex: 1940  (83 y.o. male)   MRN & CSN:  [id number] & [id number] Encounter Date/Time: 2024 12:25 PM EDT    Location:  [address] [place] [address] PCP: Roberta Schafer        [facility] Day: 13     Assessment and Plan:   Eliazar Ugarte is a 83 y.o. male  who presents with Acute encephalopathy     Patient is awaiting placement to SNF however needs to be sitter free for 24 hours to be discharged.  Patient is currently confused and is currently requiring sitter due to behavioral disturbances and danger to himself.     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  -Delirium precautions  -Continue Depakote  -Psych consulted, appreciate recs  -Due to elevated QTc avoid all QTc prolonging    Component Value Date/Time     HGB 13.9 05/12/2024 01:54 AM     HCT 43.1 05/12/2024 01:54 AM      BMP:          Lab Results   Component Value Date/Time      05/12/2024 01:54 AM     K 3.7 05/12/2024 01:54 AM      05/12/2024 01:54 AM     CO2 24 05/12/2024 01:54 AM     BUN 14 05/12/2024 01:54 AM     CREATININE 1.2 05/12/2024 01:54 AM     CALCIUM 8.0 05/12/2024 01:54 AM     LABGLOM 60 05/12/2024 01:54 AM     LABGLOM >60 02/06/2024 05:40 AM      PT/INR:          Lab Results   Component Value Date/Time     PROTIME 17.1 05/07/2024 07:49 PM     INR 1.4 05/07/2024 07:49 PM            U/A:          Lab Results   Component Value Date/Time     COLORU YELLOW 05/07/2024 08:54 PM     PROTEINU 100 05/07/2024 08:54 PM     PHUR 6.0 05/07/2024 08:54 PM     WBCUA 4 05/07/2024 08:54 PM     RBCUA 592 05/07/2024 08:54 PM     MUCUS RARE 05/07/2024 08:54 PM     TRICHOMONAS NONE SEEN 05/07/2024 08:54 PM     YEAST FEW 05/07/2024 08:54 PM     BACTERIA RARE 05/07/2024 08:54 PM     CLARITYU CLOUDY 05/07/2024 08:54 PM     LEUKOCYTESUR NEGATIVE 05/07/2024 08:54 PM     UROBILINOGEN 0.2 05/07/2024 08:54 PM     BILIRUBINUR NEGATIVE 05/07/2024 08:54 PM     BLOODU LARGE NUMBER OR AMOUNT OBSERVED 05/07/2024 08:54 PM     GLUCOSEU NEGATIVE 05/07/2024 08:54 PM            Assessment & Plan:       Eliazar Ugarte is a 83 y.o. male admitted 5/7/2024 for AMS workup.  Was scheduled for release today but had a catheter placed overnight for urinary retention     1) urinary retention: chronically on Flomax.  Catheter placed overnight for urinary retention with a PVR of 750 cc.  By report he was pulling at his catheter this AM (confused).     Ideally he'd have intermittent catheterization to minimize risk of his pulling out his catheter particularly when anticoagulated.  Not likely available to him with his social situation.     Suggest voiding trial in ~3 days when ambulatory.  Suggest f/u in office for cystoscopy, urocuff, pvr, prostate volume  05/18/2024 1458 EDT midodrine (PROAMATINE) tablet 5 mg 0 mg Oral Held Brigitte Jones RN    05/18/2024 0946 EDT midodrine (PROAMATINE) tablet 5 mg 5 mg Oral Given Brigitte Jones RN    05/17/2024 1818 EDT midodrine (PROAMATINE) tablet 5 mg 5 mg Oral Not Given Paulette Beltran RN    05/17/2024 1113 EDT midodrine (PROAMATINE) tablet 5 mg 5 mg Oral Not Given Paulette Beltran RN    05/17/2024 0925 EDT midodrine (PROAMATINE) tablet 5 mg 5 mg Oral Given Paulette Beltran RN    05/19/2024 2118 EDT apixaban (ELIQUIS) tablet 5 mg 5 mg Oral Given Brannon Dick RN    05/19/2024 1325 EDT apixaban (ELIQUIS) tablet 5 mg -- Oral Unheld by provider Paris Ng MD    05/19/2024 0900 EDT apixaban (ELIQUIS) tablet 5 mg -- Oral Automatically Held (none)    05/18/2024 2100 EDT apixaban (ELIQUIS) tablet 5 mg -- Oral Automatically Held (none)    05/18/2024 0900 EDT apixaban (ELIQUIS) tablet 5 mg -- Oral Automatically Held (none)    05/17/2024 2100 EDT apixaban (ELIQUIS) tablet 5 mg -- Oral Automatically Held (none)    05/19/2024 0852 EDT furosemide (LASIX) tablet 20 mg 20 mg Oral Given Priya Larsen RN    05/18/2024 0946 EDT furosemide (LASIX) tablet 20 mg 20 mg Oral Given Brigitte Jones RN    05/17/2024 0925 EDT furosemide (LASIX) tablet 20 mg 20 mg Oral Given Paulette Beltran RN    05/19/2024 2118 EDT tamsulosin (FLOMAX) capsule 0.4 mg 0.4 mg Oral Given Brannon Dick RN    05/18/2024 2103 EDT tamsulosin (FLOMAX) capsule 0.4 mg 0.4 mg Oral Given Lawrence Farnsworth, LEANN    05/17/2024 2033 EDT tamsulosin (FLOMAX) capsule 0.4 mg 0.4 mg Oral Given Lea Joseph, RN    05/19/2024 0852 EDT valproate (DEPACON) 125 mg in sodium chloride 0.9 % 100 mL IVPB -- IntraVENous See Alternative Priya Larsen RN    05/18/2024 0950 EDT valproate (DEPACON) 125 mg in sodium chloride 0.9 % 100 mL IVPB -- IntraVENous See Alternative Brigitte Jones, LEANN    05/17/2024 0925 EDT valproate (DEPACON) 125 mg in sodium chloride 0.9 % 100 mL IVPB --

## 2024-05-20 NOTE — PROGRESS NOTES
Physician Progress Note      PATIENT:               NOLBERTO HUTCHISON  CSN #:                  [id number]  :                       1940  ADMIT DATE:       2024 7:19 PM  DISCH DATE:  RESPONDING  PROVIDER #:        Paris Ng MD          QUERY TEXT:    Pt admitted with AMS and facial weakness. Pt noted to have dementia and   metabolic encephalopathy due to PNA. If possible, please document in progress   notes and discharge summary the etiology of AMS.      The medical record reflects the following:  Risk Factors: Dementia, PNA  Clinical Indicators: Neuro note on  \"Altered mental status likely secondary   to metabolic encephalopathy superimposed on SVT, JOSH on CKD, pneumonia. 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    Thank you,  Sia BETANCOURTN, RN, [facility] [phone]  Options provided:  -- AMS due to dementia with behavioral disturbance, encephalopathy was ruled   out  -- AMS due to stroke recrudescence  -- AMS secondary to metabolic encephalopathy due to JOSH  -- AMS due to, please specify.  -- Other - I will add my own diagnosis  -- Disagree - Not applicable / Not valid  -- Disagree - Clinically unable to determine / Unknown  -- Refer to Clinical Documentation Reviewer    PROVIDER RESPONSE TEXT:    This patient has AMS due to dementia with behavioral disturbance,   encephalopathy was ruled out.    Query created by: Sia Lewis on 2024 12:06 PM      QUERY TEXT:    Patient admitted with stroke-like symptoms.  Noted documentation of sepsis in    Dr. Hurley query answer and  it is later noted that pneumonia was   ruled out. If possible, please document in progress notes and discharge   summary the source of sepsis:    The medical record reflects the following:  Risk Factors: Acute CHF  Clinical Indicators: Query answered on 5/14 stated the diagnosis of sepsis per   Dr. Harmon.  The medical record reflects the following:  WBC 12-8.3, LA   2.3-1.7, procal 0.199, T 100.3-96.5, P146-66, R 31-14, Pro-BNP 6805., ED   Provider note \"Respiratory: Crackles in lung bases bilaterally, increased work   of breathing, cough\", 5/14 Dr Ng note \"Pneumonia...no evidence of any   infectious etiology at present that would require continued antibiotic   administration.\", Cardiology note on 5/9 Elevated troponin likely in the   setting of acute stroke and non-ACS in nature.  Demand isc  Treatment: IV Zithromax, IV cefepime, IV Vanc, IV Cardizem started on 5/10,   labs, IV Lasix started on 5/7, troponin, cardiology consult.    Thank you,  Sia Lewis BSN, RN, [facility] [phone]  Options provided:  -- Sepsis, present on admission, due to unknown bacterial infection  -- Sepsis, not present on admission due to resolved pneumonia  -- Sepsis was ruled out  -- SIRS of non-infectious origin due to acute on chronic diastolic CHF with   JOSH and type II MI, sepsis was ruled out  -- Other - I will add my own diagnosis  -- Disagree - Not applicable / Not valid  -- Disagree - Clinically unable to determine / Unknown  -- Refer to Clinical Documentation Reviewer    PROVIDER RESPONSE TEXT:    After study, sepsis was ruled out.    Query created by: Sia Lewis on 5/20/2024 12:06 PM      Electronically signed by:  Paris Ng MD 5/20/2024 1:19 PM

## 2024-05-20 NOTE — PROGRESS NOTES
Psychiatric Progress Note    Eliazar Ugarte  [id number]  05/20/24    CHIEF COMPLAINT: AMS     HPI: Patient is an 83 year old male with pmhx of  a fib on eliquis, coronary artery disease s/p CABG, hypertension, diabetes mellitus type 2, BEBETO as per documentation, dementia, history of CVA, BPH, congestive heart failure, who was sent from the nursing home via EMS to [facility] ED for confusion, fall, strokelike symptoms on 5/7/24. [facility] tele neurology evaluated patient in ED with score of NIH 8 but intervention not recommended as patient is on anticoagulation. AMS due to multiple reasons including SVT and pneumonia as well as progression of dementia. Patient becoming more confused and agitated through hospital stay. MRI done and negative for acute stroke. Sitter and /or COMPA has been utilized.  Consults include cardiology, EP, neurology, interventional  neuro-Radiology, Urology.. Psychiatry consulted by Dr Ng due to \"AMS\"    Met with patient this am at bedside. Room was dark, but sitter put blinds up as they were finishing patient care. Patient was confused but engaged with staff and talking more than his norm. He appears to be doing better this am.   Insight and judgment are impaired.    Medications Prior to Admission: glipiZIDE (GLUCOTROL) 5 MG tablet, Take 1 tablet by mouth 2 times daily (before meals)  furosemide (LASIX) 20 MG tablet, Take 1 tablet by mouth daily  potassium chloride (KLOR-CON) 20 MEQ packet, Take 20 mEq by mouth daily  carboxymethylcellulose 1 % ophthalmic solution, Place 1 drop into both eyes 3 times daily  Cholecalciferol (VITAMIN D3) 125 MCG (5000 UT) TABS, Take by mouth  divalproex (DEPAKOTE) 125 MG DR tablet, Take 1 tablet by mouth daily  divalproex (DEPAKOTE) 500 MG DR tablet, Take 1 tablet by mouth at bedtime  melatonin 3 MG TABS tablet, Take 2 tablets by mouth nightly as needed (for sleep)  Probiotic Product (ACIDOPHILUS PEARLS PO), Take 1 capsule by mouth daily  atorvastatin (LIPITOR) 40 MG tablet,  Take 1 tablet by mouth nightly  docusate sodium (COLACE) 100 MG capsule, Take 1 capsule by mouth daily  apixaban (ELIQUIS) 5 MG TABS tablet, Take 1 tablet by mouth 2 times daily  Multiple Vitamins-Minerals (EYE MULTIVITAMIN/SODIUM PO), Take 1 tablet by mouth 2 times daily  Omega-3 Fatty Acids (FISH OIL) 1000 MG capsule, Take 1 capsule by mouth daily  omeprazole (PRILOSEC) 20 MG delayed release capsule, Take 1 capsule by mouth daily  Calcium Carb-Cholecalciferol (OYSTER SHELL CALCIUM + D PO), Take 1 tablet by mouth daily Receives 500/200 mg tablets  tamsulosin (FLOMAX) 0.4 MG capsule, Take 1 capsule by mouth nightly  b complex vitamins capsule, Take 1 capsule by mouth daily  nitroGLYCERIN (NITROSTAT) 0.4 MG SL tablet, Place 1 tablet under the tongue every 5 minutes as needed for Chest pain up to max of 3 total doses. If no relief after 1 dose, call 911.  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, without behavioral disturbance, severe (HCC)    Other sequelae of other cerebrovascular disease    KALLIE (generalized anxiety disorder)    Acute encephalopathy    Altered mental status    Atrial fibrillation with RVR (HCC)    Pneumonia due to infectious organism    Thrombus    Rash and nonspecific skin eruption       Review of Systems    OBJECTIVE  Vital Signs:  Vitals:    05/20/24 1100   BP: 109/64   Pulse: 71   Resp: 16   SpO2: 95%       Labs:  Recent Results (from the past 48 hour(s))   POCT Glucose    Collection Time: 05/14/24  4:43 PM   Result Value Ref Range    POC Glucose 131 (H) 70 - 99 MG/DL   POCT Glucose    Collection Time:

## 2024-05-20 NOTE — CARE COORDINATION
Chart reviewed and patient discussed in IDR. From [facility] [place]. Wants to return skilled. Will need precert. [facility] following patient. Needs ID final recs. Will need precert.

## 2024-05-20 NOTE — PROGRESS NOTES
Infectious Disease Progress Note  2024   Patient Name: Eliazar Ugarte : 1940     Assessment  Back rash  Initially admitted for strokelike symptoms and treated with vancomycin and cefepime for pneumonia.  He is a poor historian due to neurocognitive disorder.   His RN described that he had a generalized body rash. It appears that this has resolved.   Unlikely to be drug reaction since it has resolved.   Comorbid conditions:  atrial fibrillation, type 2 diabetes mellitus, hyperlipidemia, hypertension     Plan  Therapeutic:  Abx not required  Diagnostic:  F/u:  Other:  Will sign off and not follow the patient actively, please reconsult as needed.  Recommend outpatient allergist referral after discharge      Reason for visit: F/u back rash  History:?Interval history noted  Denies n/v/d/f or untoward effects of antimicrobials  Physical Exam:  Vital Signs: /85   Pulse 80   Temp 98.2 °F (36.8 °C) (Oral)   Resp 18   Ht 1.854 m (6' 1\")   Wt 100.4 kg (221 lb 5.5 oz)   SpO2 98%   BMI 29.20 kg/m²     Gen: alert but confused  Skin: no stigmata of endocarditis  Wounds: C/D/I  HEMT: AT/NC Oropharynx pink, moist, and without lesions or exudates; dentition in good state of repair  Eyes: PERRLA, EOMI, conjunctiva pink, sclera anicteric.   Neck: Supple. Trachea midline. No LAD.  Chest: no distress and CTA. Good air movement.  Heart: RRR and no MRG.   Abd: soft, non-distended, no tenderness, no hepatomegaly. Normoactive bowel sounds.  Ext: no clubbing, cyanosis, or edema  Catheter Site: without erythema or tenderness  LDA:   Neuro: Mental status intact. CN 2-12 intact and no focal sensory or motor deficits     Radiologic / Imaging / TESTING  No results found.     Labs:    Recent Results (from the past 24 hour(s))   POCT Glucose    Collection Time: 24 11:24 AM   Result Value Ref Range    POC Glucose 164 (H) 70 - 99 MG/DL   POCT Glucose    Collection Time: 24  4:11 PM   Result Value Ref Range    POC  Glucose 114 (H) 70 - 99 MG/DL   POCT Glucose    Collection Time: 05/19/24  9:15 PM   Result Value Ref Range    POC Glucose 141 (H) 70 - 99 MG/DL     CULTURE results: Invalid input(s): \"BLOOD CULTURE\", \"URINE CULTURE\", \"SURGICAL CULTURE\"    Diagnosis:  Patient Active Problem List   Diagnosis    Delirium due to medical condition with behavioral disturbance    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, without behavioral disturbance, severe (HCC)    Other sequelae of other cerebrovascular disease    KALLIE (generalized anxiety disorder)    Acute encephalopathy    Altered mental status    Atrial fibrillation with RVR (HCC)    Pneumonia due to infectious organism    Thrombus    Rash and nonspecific skin eruption       Active Problems  Principal Problem:    Acute encephalopathy  Active Problems:    Altered mental status    Atrial fibrillation with RVR (HCC)    Pneumonia due to infectious organism    Thrombus    Rash and nonspecific skin eruption  Resolved Problems:    * No resolved hospital problems. *              Electronically signed by: Electronically signed by Ha Henry MD on 5/20/2024 at 9:07 AM

## 2024-05-20 NOTE — PROGRESS NOTES
Physical Therapy Treatment Note  Name: Eliazar Ugarte MRN: [id number] :   1940   Date:  2024   Admission Date: 2024 Room:  [address] [place], [place] [address]A   Restrictions/Precautions: fall risk, gen prec   Communication with other providers:  Pt okay to see for therapy per RN   Subjective:  Patient states:  \"Do you want to stand on that side of the toilet?\"   Pain:   Location, Type, Intensity (0/10 to 10/10):  Denies   Objective:    Observation:  Pt supine in bed upon PTA arrival.   Objective Measures:  Tele, stable.  Treatment, including education/measures:    Therapeutic Activity Training:   Therapeutic activity training was instructed today.  Cues were given for safety, sequence, UE/LE placement, awareness, and balance. Activities performed today included bed mobility training, sup-sit, sit-stand, SPT.    Mobility:  Sup > sit: Min A x 2 at trunk to right, pt is able to fully transition LEs off EOB.   Scooting: CGA for safety.   STS: Min A x 2 from EOB and from commode.   SPT: Ambulating SPT to commode with CGA for safety max cues for AD management and advancement.     Gait:  Pt ambulated ~25ft with RW and CGA x 2 for safety with close chair follow. Pt requires encouragement and cues for continued initiation and for navigation. Pt demos mild retropulsion, decreased step length, decreased gait speed, NBOS, decreased foot clearance, forward flexed posture, inconsistent foot placement. PTA provided cues for increased SERINA and for navigation this date.     Balance:  Seated: Good, CGA for safety dt impulsivity and attempt to stand prematurely.   Standing: Fair, pt requires CGA for safety, pt is able to don depends with CGA and no LOB noted.     Safety:   Pt returned safely to recliner with chair alarm activated, call light in reach, all needs met.   Assessment / Impression:    Pt tolerated OOB activities well this date with mod increase in fatigue by end of session.   Patient's tolerance of treatment:  Good   Adverse  Reaction: none  Significant change in status and impact:  none  Barriers to improvement:  Cognition  Plan for Next Session:    Plan to continue OOB activities.   Time in:  1427  Time out:  1457  Timed treatment minutes:  30  Total treatment time:  30    Previously filed items:  Social/Functional History  Lives With: Alone  Type of Home: Assisted living ([facility])  Home Layout: One level  Home Access: Level entry  Home Equipment: Walker - Rolling  ADL Assistance: Independent  Homemaking Assistance: Needs assistance  Homemaking Responsibilities: Yes  Ambulation Assistance: Independent  Transfer Assistance: Independent  Active : No        Short Term Goals  Short Term Goal 1: Pt will ambulate 100' with RW and SUP  Short Term Goal 2: Pt will perform supine to sit with SUP  Short Term Goal 3: Pt will perform sit to stand with SUP    Electronically signed by:    Leeanna Faulkner PTA  5/20/2024, 3:53 PM

## 2024-05-20 NOTE — PROGRESS NOTES
Occupational Therapy Treatment Note    Name: Eliazar Ugarte MRN: [id number] :   1940   Date:  2024   Admission Date: 2024 Room:  [address] [place] [address]         Restrictions/Precautions:          fall risk    Communication with other providers: RN approval of therapy, co-tx with PTA, OT present throughout session     Subjective:  Patient states:  \" I need to use the bathroom\"   Pain:   Location, Type, Intensity (0/10 to 10/10):  no complaint of pain this date     Objective:    Observation: supine in bed upon arrival, agreeable to therapy    Objective Measures:on room air     Treatment, including education:  Therapeutic Activity Training:   Therapeutic activity training was instructed today.  Cues were given for safety, sequence, UE/LE placement, awareness, and balance.    Activities performed today included bed mobility training, sup-sit, sit-stand, functional mobility    Self Care Training:   Cues were given for safety, sequence, UE/LE placement, visual cues, and balance.    Activities performed today included LB dressing tasks, toileting    Supine to EOB min A x2 for trunk elevation, HOB elevated and use bed features, mod v/c's for sequencing and use of bed features. Maintain sitting EOB with CGA. Don socks sitting EOB with CGA for balance safety, min v/c's for sequencing. Don depends sit/stand EOB with min A to stabilize front of depends while pt laced feet, pt able to pull rest of way, min v/c's for sequencing. STS from EOB x1, Standard commode x1 with min A x2 for force production. Maintain stand with min A x2 progressing to CGA x2 d/t retropulsion, min v/c's to regain stability (lean forward). Functional mobility 8ft + 25ft +25ft using 2ww with CGA x2 for balance safety, min v/c's for 2ww management and pathway navigation. Pull/up down depends with mod A to reach, mod v/c's for initiation and for pt engagement in task. Complete pericare in standing with max A to reach and thoroughness, additional min A  to maintain stand d/t retropulsion and balance safety, min v/c's for task completion as pt begin attempting to walk away. Sitting in chair, washed face with setupA.     Pt educated on role of OT, benefits of OT, and rationale for therapeutic intervention. Educated on need to be patient advocate, assist to fullest ability with ADL completion, and benefits of OOB activity. Pt left in chair, alarm on, call light within reach, all current needs met     Assessment / Impression:    Patient's tolerance of treatment: Well  Adverse Reaction: None  Significant change in status and impact: Improved from initial evaluation  Barriers to improvement: None noted      Plan for Next Session:    Cont OT POC     Time in:  1428  Time out:  1451  Timed treatment minutes:  23  Total treatment time:  23      Electronically signed by:    ELMIRA Eckert/OT  5/20/2024, 3:35 PM

## 2024-05-20 NOTE — PROGRESS NOTES
V2.0  [facility] Hospitalist Progress Note      Name:  Eliazar Ugarte /Age/Sex: 1940  (83 y.o. male)   MRN & CSN:  [id number] & [id number] Encounter Date/Time: 2024 12:25 PM EDT    Location:  [address] [place] [address]-A PCP: Roberta Schafer       [facility] Day: 14    Assessment and Plan:   Eliazar Ugarte is a 83 y.o. male  who presents with Acute encephalopathy    Patient is awaiting placement to SNF however needs to be sitter free for 24 hours to be discharged.  Patient is currently confused and is currently requiring sitter due to behavioral disturbances and danger to himself.    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  Pneumonia - ruled out  -Chest x-ray-right upper lobe suprahilar airspace disease   -Blood cultures drawn in ED negative  -Procalcitonin 0.199  -IV antibiotics: Vancomycin plus cefepime.  Will discontinue antibiotics as there is no evidence of any infectious etiology at present that would require continued antibiotic administration  -Follow clinically    #.  Strokelike symptoms - stroke ruled out  -Stroke alert called in ED  -Last well-known-5 PM  -NIHSS could not be done as patient is currently sleeping, not able to be kept awake.  -CT head-no acute intracranial abnormality  -CTA head and neck-atherosclerotic changes noted in the region of the carotid bifurcation bilaterally.  Left greater than the right with approximately 40% diameter stenosis and small focal adherent thrombus seen within the lumen of the proximal left internal carotid artery.   PRN  dextrose bolus, 125 mL, PRN   Or  dextrose bolus, 250 mL, PRN  glucagon (rDNA), 1 mg, PRN  dextrose, , Continuous PRN  prochlorperazine, 10 mg, Q8H PRN   Or  prochlorperazine, 10 mg, Q6H PRN        Labs           Imaging/Diagnostics Last 24 Hours   IR Angiogram Carotid Cerebral Bilateral    Result Date: 5/9/2024  Cerebral and Cervical Angiogram Eliazar Ugarte 1940 Procedure Date:  5/9/24 Access:  Right common femoral artery Closure:  Angioseal Indication: thrombus, stroke like symptoms. No significant blood loss Malampati 2 Procedures performed: Ultrasound-guided needle access into the right common femoral artery Right common femoral artery angiogram with Angio-Seal for closure Selective bilateral biplane common carotid artery cervical angiograms Selective right biplane internal carotid artery cerebral angiogram Selective right biplane external carotid artery head angiogram Selective left common carotid artery cerebral angiogram Selective bilateral biplane vertebral cerebral angiograms Procedure: The patient was appropriately consented.  The patient was brought to the angiography suite laid in a supine position on the angiography table. The right groin was prepped and draped in a normal sterile fashion.  Lidocaine was used for local anesthetic.  Fentanyl and Versed were used for conscious sedation.  A micropuncture was used with ultrasound-guided needle access into the right common femoral artery.  A 5 [place] citizen sheath was then placed.  Next a 5 [place] citizen diagnostic catheter was brought up over an angled Glidewire into the aortic arch and used to catheter the great vessels under fluoroscopic guidance. The catheter was taken into the right subclavian artery and then into the right vertebral artery and a cerebral angiogram was done which shows contrast flowing an antegrade direction to the cervical portion of the right vertebral artery and then into the intracranial portion.  In the proximal cervical segment there  imaging performed for the purposes of IV contrast bolus tracking. Axial CT imaging obtained from skull base through the lung apices following administration of IV contrast. Axial images, multiplanar reformatted images, and maximum intensity projection images were reviewed for CT angiographic technique.   Up-to-date CT equipment and radiation dose reduction techniques were employed. IV contrast: 75 mL Isovue-370. FINDINGS: AORTIC ARCH: Standard three-vessel aortic arch anatomy is present. INNOMINATE ARTERY: Normal. RIGHT SUBCLAVIAN ARTERY: Normal. RIGHT VERTEBRAL ARTERY: Right vertebral artery appears occluded distally. RIGHT CAROTID ARTERY: Atherosclerotic calcifications are seen in the region of the carotid bifurcation on the right with mild, approximately 25% diameter narrowing of the proximal right internal carotid artery. LEFT SUBCLAVIAN ARTERY: Normal. LEFT VERTEBRAL ARTERY: Left vertebral artery appears patent and appears to give rise to the basilar artery. Atherosclerotic calcification is seen in the distal left vertebral with atherosclerotic calcification and mild narrowing at the origin. LEFT CAROTID ARTERY: Moderately advanced atherosclerotic calcification is seen in the region of the carotid bifurcation on the left in the proximal left internal carotid artery. There is small adherent thrombus seen within the proximal left internal carotid artery with approximately 40% diameter stenosis. NECK SOFT TISSUES: No neck soft tissue mass or lymphadenopathy. VISUALIZED MEDIASTINUM: No mass or lymphadenopathy. VISUALIZED LUNG APICES: Clear. BONES: No destructive osseous process. OTHER: None.     Atherosclerotic changes seen in the region of the carotid bifurcation bilaterally, left greater the right with approximately 40% diameter stenosis and small focal adherent thrombus seen within the lumen of the proximal left internal carotid artery. 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 Dominant left vertebral artery  gives rise to the basilar artery and shows atherosclerotic calcifications both distally and at its origin. PROCEDURE: CT ANGIOGRAPHY HEAD WITH/WITHOUT CONTRAST INDICATION: ams facial droop COMPARISON: none TECHNIQUE: Axial CT imaging obtained through the head prior to and following administration of IV contrast. Axial images, multiplanar reformatted images, and maximum intensity projection images were reviewed for CT angiographic technique. IV contrast: 75 mL Isovue-370. FINDINGS: ANTERIOR CIRCULATION: The intracranial internal carotid arteries, anterior cerebral arteries, and middle cerebral arteries demonstrate no occlusion or stenosis. No evidence for aneurysm or arteriovenous malformation. POSTERIOR CIRCULATION: The distal left vertebral artery, basilar artery and posterior cerebral arteries demonstrate no occlusion or stenosis. No evidence for aneurysm or arteriovenous malformation. INTRACRANIAL VENOUS SYSTEM: There is no significant enhancement seen of the dural venous sinuses. This may be due to early phase of contrast but thrombus is not excluded. Refer to recent noncontrast CT of the head for additional results.. IMPRESSION: No significant intracranial arterial vascular pathology is identified. There is nonenhancement of the dural saphenous sinuses which may be due to early phase of contrast opacification but the possibility of thrombosis cannot be excluded on the basis of this exam. Electronically signed by Shine Ignacio MD    CT CERVICAL SPINE WO CONTRAST    Result Date: 5/7/2024  EXAM: CT CERVICAL SPINE WO CONTRAST INDICATION: Fall COMPARISON: None Technical factors: Non-contrast CT imaging was performed of the cervical spine. Axial and multiplanar reformatted images reviewed.   Individualized dose optimization technique was used in order to meet ALARA standards for radiation dose reduction.  In addition to vendor specific dose reduction algorithms, the dose reduction techniques vary based on the

## 2024-05-21 LAB
GLUCOSE BLD-MCNC: 121 MG/DL (ref 70–99)
GLUCOSE BLD-MCNC: 122 MG/DL (ref 70–99)
GLUCOSE BLD-MCNC: 126 MG/DL (ref 70–99)
GLUCOSE BLD-MCNC: 131 MG/DL (ref 70–99)

## 2024-05-21 PROCEDURE — 6360000002 HC RX W HCPCS

## 2024-05-21 PROCEDURE — 2580000003 HC RX 258

## 2024-05-21 PROCEDURE — 6370000000 HC RX 637 (ALT 250 FOR IP): Performed by: NURSE PRACTITIONER

## 2024-05-21 PROCEDURE — 6370000000 HC RX 637 (ALT 250 FOR IP): Performed by: INTERNAL MEDICINE

## 2024-05-21 PROCEDURE — 6360000002 HC RX W HCPCS: Performed by: NURSE PRACTITIONER

## 2024-05-21 PROCEDURE — 94761 N-INVAS EAR/PLS OXIMETRY MLT: CPT

## 2024-05-21 PROCEDURE — 1200000000 HC SEMI PRIVATE

## 2024-05-21 PROCEDURE — 82962 GLUCOSE BLOOD TEST: CPT

## 2024-05-21 PROCEDURE — 6370000000 HC RX 637 (ALT 250 FOR IP): Performed by: STUDENT IN AN ORGANIZED HEALTH CARE EDUCATION/TRAINING PROGRAM

## 2024-05-21 PROCEDURE — 99233 SBSQ HOSP IP/OBS HIGH 50: CPT | Performed by: NURSE PRACTITIONER

## 2024-05-21 RX ORDER — ASPIRIN 81 MG/1
81 TABLET, CHEWABLE ORAL DAILY
Qty: 30 TABLET | Refills: 3 | Status: SHIPPED | OUTPATIENT
Start: 2024-05-21

## 2024-05-21 RX ORDER — DIVALPROEX SODIUM 125 MG/1
125 CAPSULE, COATED PELLETS ORAL DAILY
Status: DISCONTINUED | OUTPATIENT
Start: 2024-05-21 | End: 2024-05-23 | Stop reason: HOSPADM

## 2024-05-21 RX ORDER — HYDROXYZINE HYDROCHLORIDE 50 MG/ML
25 INJECTION, SOLUTION INTRAMUSCULAR ONCE
Status: COMPLETED | OUTPATIENT
Start: 2024-05-21 | End: 2024-05-21

## 2024-05-21 RX ORDER — DIVALPROEX SODIUM 250 MG/1
250 TABLET, DELAYED RELEASE ORAL ONCE
Status: DISCONTINUED | OUTPATIENT
Start: 2024-05-21 | End: 2024-05-23 | Stop reason: HOSPADM

## 2024-05-21 RX ORDER — DIVALPROEX SODIUM 250 MG/1
250 TABLET, DELAYED RELEASE ORAL
Status: DISCONTINUED | OUTPATIENT
Start: 2024-05-21 | End: 2024-05-23 | Stop reason: HOSPADM

## 2024-05-21 RX ORDER — DIVALPROEX SODIUM 250 MG/1
250 TABLET, DELAYED RELEASE ORAL
Qty: 90 TABLET | Refills: 3 | Status: SHIPPED | OUTPATIENT
Start: 2024-05-21

## 2024-05-21 RX ORDER — MIDODRINE HYDROCHLORIDE 5 MG/1
5 TABLET ORAL
Qty: 90 TABLET | Refills: 3 | Status: SHIPPED | OUTPATIENT
Start: 2024-05-21

## 2024-05-21 RX ORDER — DIVALPROEX SODIUM 125 MG/1
125 TABLET, DELAYED RELEASE ORAL DAILY
Qty: 90 TABLET | Refills: 3 | Status: SHIPPED | OUTPATIENT
Start: 2024-05-21

## 2024-05-21 RX ADMIN — DIVALPROEX SODIUM 500 MG: 125 CAPSULE, COATED PELLETS ORAL at 20:45

## 2024-05-21 RX ADMIN — METOPROLOL TARTRATE 25 MG: 25 TABLET, FILM COATED ORAL at 20:45

## 2024-05-21 RX ADMIN — APIXABAN 5 MG: 5 TABLET, FILM COATED ORAL at 20:46

## 2024-05-21 RX ADMIN — APIXABAN 5 MG: 5 TABLET, FILM COATED ORAL at 10:57

## 2024-05-21 RX ADMIN — ASPIRIN 81 MG: 81 TABLET, CHEWABLE ORAL at 10:57

## 2024-05-21 RX ADMIN — CARBOXYMETHYLCELLULOSE SODIUM 1 DROP: 10 GEL OPHTHALMIC at 16:24

## 2024-05-21 RX ADMIN — POTASSIUM BICARBONATE 20 MEQ: 782 TABLET, EFFERVESCENT ORAL at 10:52

## 2024-05-21 RX ADMIN — TAMSULOSIN HYDROCHLORIDE 0.4 MG: 0.4 CAPSULE ORAL at 20:45

## 2024-05-21 RX ADMIN — HYDROXYZINE HYDROCHLORIDE 25 MG: 50 INJECTION, SOLUTION INTRAMUSCULAR at 22:48

## 2024-05-21 RX ADMIN — MICONAZOLE NITRATE: 2 POWDER TOPICAL at 20:46

## 2024-05-21 RX ADMIN — WATER 5 MG: 1 INJECTION INTRAMUSCULAR; INTRAVENOUS; SUBCUTANEOUS at 20:01

## 2024-05-21 RX ADMIN — MICONAZOLE NITRATE: 2 POWDER TOPICAL at 13:40

## 2024-05-21 RX ADMIN — ATORVASTATIN CALCIUM 80 MG: 40 TABLET, FILM COATED ORAL at 20:45

## 2024-05-21 RX ADMIN — VITAMIN B COMPLEX 1 CAPSULE: at 10:58

## 2024-05-21 RX ADMIN — Medication 6 MG: at 20:45

## 2024-05-21 RX ADMIN — HYDROXYZINE HYDROCHLORIDE 50 MG: 50 INJECTION, SOLUTION INTRAMUSCULAR at 00:13

## 2024-05-21 RX ADMIN — DOCUSATE SODIUM 100 MG: 100 CAPSULE, LIQUID FILLED ORAL at 10:57

## 2024-05-21 RX ADMIN — CARBOXYMETHYLCELLULOSE SODIUM 1 DROP: 10 GEL OPHTHALMIC at 13:39

## 2024-05-21 RX ADMIN — POLYETHYLENE GLYCOL (3350) 17 G: 17 POWDER, FOR SOLUTION ORAL at 10:58

## 2024-05-21 RX ADMIN — METOPROLOL TARTRATE 25 MG: 25 TABLET, FILM COATED ORAL at 10:57

## 2024-05-21 RX ADMIN — PANTOPRAZOLE SODIUM 40 MG: 40 TABLET, DELAYED RELEASE ORAL at 04:35

## 2024-05-21 RX ADMIN — Medication 5000 UNITS: at 10:52

## 2024-05-21 RX ADMIN — ACETAMINOPHEN 650 MG: 325 TABLET ORAL at 04:35

## 2024-05-21 RX ADMIN — FUROSEMIDE 20 MG: 20 TABLET ORAL at 10:51

## 2024-05-21 RX ADMIN — ACETAMINOPHEN 650 MG: 325 TABLET ORAL at 22:50

## 2024-05-21 ASSESSMENT — PAIN SCALES - GENERAL: PAINLEVEL_OUTOF10: 0

## 2024-05-21 ASSESSMENT — PAIN SCALES - WONG BAKER: WONGBAKER_NUMERICALRESPONSE: NO HURT

## 2024-05-21 NOTE — PROGRESS NOTES
V2.0  [facility] [place] [facility]ist Progress Note      Name:  Eliazar Ugarte /Age/Sex: 1940  (83 y.o. male)   MRN & CSN:  [id number] & [id number] Encounter Date/Time: 2024 12:25 PM EDT    Location:  [facility] PCP: Roberta Schafer       [facility] Day: 15      Subjective:     Chief Complaint: Altered Mental Status, Fall, and Extremity Weakness     Patient seen and evaluated at bedside.  Off sitter since yesterday noon. Pt was calm not interactive. However, was noted to be yelling when no one in the room.        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 Calmer today, .hasn't been taking his oral meds all the time.   -Delirium precautions  -Continue Depakote per psych on 125 daily, 250 w dinner and 500 bed time  -Psych consulted, appreciate recs  -Due to elevated QTc avoid all QTc prolonging medications including antipsychotics     Diffuse pruritic macular rash  -Ongoing for the past many days  -little benefit with barrier treatment/hydration therapy  -ID consulted, no acute interventions indicated     Pneumonia - ruled out  -Chest x-ray-right upper lobe suprahilar airspace disease   -Blood cultures drawn in ED negative  -Procalcitonin 0.199  - received IV vanc+cefepime 7 days; no evidence of any infectious etiology at present   -Follow clinically    Strokelike symptoms - stroke ruled out  -Stroke alert called in ED  -Last well-known-5 PM  -NIHSS could not be done as patient is currently sleeping, not able to be kept awake.  -CT head-no acute intracranial abnormality  -CTA head and neck-atherosclerotic changes noted in the region of the carotid bifurcation bilaterally.  Left greater than the right with approximately 40% diameter stenosis and small focal adherent thrombus seen within the lumen of the proximal left internal carotid artery.  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  -Patient was not deemed a TNK candidate as he  Angioseal Indication: thrombus, stroke like symptoms. No significant blood loss Malampati 2 Procedures performed: Ultrasound-guided needle access into the right common femoral artery Right common femoral artery angiogram with Angio-Seal for closure Selective bilateral biplane common carotid artery cervical angiograms Selective right biplane internal carotid artery cerebral angiogram Selective right biplane external carotid artery head angiogram Selective left common carotid artery cerebral angiogram Selective bilateral biplane vertebral cerebral angiograms Procedure: The patient was appropriately consented.  The patient was brought to the angiography suite laid in a supine position on the angiography table. The right groin was prepped and draped in a normal sterile fashion.  Lidocaine was used for local anesthetic.  Fentanyl and Versed were used for conscious sedation.  A micropuncture was used with ultrasound-guided needle access into the right common femoral artery.  A 5 Icelandic sheath was then placed.  Next a 5 Icelandic diagnostic catheter was brought up over an angled Glidewire into the aortic arch and used to catheter the great vessels under fluoroscopic guidance. The catheter was taken into the right subclavian artery and then into the right vertebral artery and a cerebral angiogram was done which shows contrast flowing an antegrade direction to the cervical portion of the right vertebral artery and then into the intracranial portion.  In the proximal cervical segment there is an irregular stenosis of 70%.  Distal flow appears to be delayed from this proximal lesion.  In the V3 segment there is a 50% stenosis.  Flow into the posterior inferior cerebral artery no significant flow is identified in the basilar artery. The catheter was brought into the right common carotid artery and a biplane cervical angiogram was done which shows contrast flowing antegrade direction to the common internal and external carotid  technique. IV contrast: 75 mL Isovue-370. FINDINGS: ANTERIOR CIRCULATION: The intracranial internal carotid arteries, anterior cerebral arteries, and middle cerebral arteries demonstrate no occlusion or stenosis. No evidence for aneurysm or arteriovenous malformation. POSTERIOR CIRCULATION: The distal left vertebral artery, basilar artery and posterior cerebral arteries demonstrate no occlusion or stenosis. No evidence for aneurysm or arteriovenous malformation. INTRACRANIAL VENOUS SYSTEM: There is no significant enhancement seen of the dural venous sinuses. This may be due to early phase of contrast but thrombus is not excluded. Refer to recent noncontrast CT of the head for additional results.. IMPRESSION: No significant intracranial arterial vascular pathology is identified. There is nonenhancement of the dural saphenous sinuses which may be due to early phase of contrast opacification but the possibility of thrombosis cannot be excluded on the basis of this exam. Electronically signed by Shine Ignacio MD    CT CERVICAL SPINE WO CONTRAST    Result Date: 5/7/2024  EXAM: CT CERVICAL SPINE WO CONTRAST INDICATION: Fall COMPARISON: None Technical factors: Non-contrast CT imaging was performed of the cervical spine. Axial and multiplanar reformatted images reviewed.   Individualized dose optimization technique was used in order to meet ALARA standards for radiation dose reduction.  In addition to vendor specific dose reduction algorithms, the dose reduction techniques vary based on the specific scanner utilized but frequently include automated exposure control, adjustment of the mA and/or kV according to patient size, and use of iterative reconstruction technique. Contrast: None FINDINGS: No evidence of acute fracture or traumatic abnormality is seen. Multilevel spondylotic changes seen with uncovertebral spurring at multiple levels noted bilaterally throughout the cervical spine. If concern discogenic disease, MRI

## 2024-05-21 NOTE — DISCHARGE INSTR - DIET

## 2024-05-21 NOTE — PROGRESS NOTES
Physician Progress Note      PATIENT:               NOLBERTO HUTCHISON  CSN #:                  [id number]  :                       1940  ADMIT DATE:       2024 7:19 PM  DISCH DATE:  RESPONDING  PROVIDER #:        Nolan Timmons MD          QUERY TEXT:    Pt admitted with AMS. Pt noted to have major neurocognitive disorder due to   vascular changes and Hx of acute ischemic stroke. If possible, please document   in progress notes and discharge summary the relationship, if any, between   dementia and previous ischemic stroke .    The medical record reflects the following:  Risk Factors: Hx ischemic stroke  Clinical Indicators: Psych progress note on  \"Major neurocognitive   disorder due to vascular changes, Other sequelae of cerebrovascular disease\",   head CT on  \"Age-related atrophy is seen with periventricular   low-attenuation seen diffusely consistent with chronic ischemic myelopathy.\"  Treatment: Psych consult, head CT, brain MRI Eliquis, ASA, Lipitor.    Thank you,  Sia DAVALOS, RN, [facility] [phone]  Options provided:  -- Vascular dementia as a sequelae of ischemic CVA: Vascular dementia as a   sequelae of ischemic CVA  -- Vascular dementia unrelated to sequelae of ischemic CVA  -- Other - I will add my own diagnosis  -- Disagree - Not applicable / Not valid  -- Disagree - Clinically unable to determine / Unknown  -- Refer to Clinical Documentation Reviewer    PROVIDER RESPONSE TEXT:    This patient has Vascular dementia, unrelated to sequelae of ischemic CVA .    Query created by: Sia Lewis on 2024 1:34 PM      Electronically signed by:  Nolan Timmons MD 2024 1:40 PM

## 2024-05-21 NOTE — CARE COORDINATION
Precert initiated via [employer] portal and pending at this time. 2:33 PM   The authorization request [id number] has been submitted    Electronic PAS completed

## 2024-05-21 NOTE — PROGRESS NOTES
Psychiatric Progress Note    Eliazar Ugarte  [id number]  05/21/24    CHIEF COMPLAINT: AMS     HPI: Patient is an 83 year old male with pmhx of  a fib on eliquis, coronary artery disease s/p CABG, hypertension, diabetes mellitus type 2, BEBETO as per documentation, dementia, history of CVA, BPH, congestive heart failure, who was sent from the nursing home via EMS to [facility] ED for confusion, fall, strokelike symptoms on 5/7/24. [facility] tele neurology evaluated patient in ED with score of NIH 8 but intervention not recommended as patient is on anticoagulation. AMS due to multiple reasons including SVT and pneumonia as well as progression of dementia. Patient becoming more confused and agitated through hospital stay. MRI done and negative for acute stroke. Sitter and /or COMPA has been utilized.  Consults include cardiology, EP, neurology, interventional  neuro-Radiology, Urology.. Psychiatry consulted by Dr Ng due to \"AMS\"    Provider PS multiple times through the evening as patient was agitated and required redirection often as he wanted oob. Depakote 125 mg po given but was not effective. Noted patient received vistaril 50 mg IM at  0013 last evening.    Met with patient this am at bedside.   Room is not well lit. He is alert, laying trendelenburg as he tries to get oob easily. He is agitated, pulling on techs arm and saying in appropriate comments to staff. Insight and judgment are impaired.    Medications Prior to Admission: glipiZIDE (GLUCOTROL) 5 MG tablet, Take 1 tablet by mouth 2 times daily (before meals)  furosemide (LASIX) 20 MG tablet, Take 1 tablet by mouth daily  potassium chloride (KLOR-CON) 20 MEQ packet, Take 20 mEq by mouth daily  carboxymethylcellulose 1 % ophthalmic solution, Place 1 drop into both eyes 3 times daily  Cholecalciferol (VITAMIN D3) 125 MCG (5000 UT) TABS, Take by mouth  divalproex (DEPAKOTE) 125 MG DR tablet, Take 1 tablet by mouth daily  divalproex (DEPAKOTE) 500 MG DR tablet, Take 1 tablet by  Spontaneous.  Thought Form: Coherent, not linear, logical but goal-directed. No tangentiality or circumstantiality. No flight of ideas or loosening of associations.  Thought Content/Perceptions: Pt is limited in response at this time.  Pt does not display any homicidal or suicidal ideations or auditory or visual hallucinations.   Insight: impaired  Judgment impaired  Memory: Immediate, recent, and remote appear impaired, though not formally tested.  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 Recommending avoiding all qtc prolonging medications including antipsychotics.   If agitated could recommend utilizing Depacon 125 mg po or IVPB prn once daily    Standard Delirium precautions:  o Redirect / reorient / reassure the patient  o Early mobilization (please allow patient to walk halls with assistance if needed)   o Reduce noise and provide adequate daytime light in the room; eula-side room preferable if available  o Prevent sleep deprivation  o Minimize use of anticholinergic drugs, benzodiazepines, and narcotics  o Use of eyeglasses and hearing aids  o Treat volume depletion  o Bowel regimens  o Avoid lines or catheters if possible  o Monitor for unintentional self-harm  o Assess fall risk    Labs and EKG reviewed  5/10 EKG qtc  536   5/15 EKG QTC  565  5/20 EKG   5/15 valproic acid level 15.0- most likely patient has not been getting Depakote due to low level  5/15 hemoglobin 15, platelets 197,

## 2024-05-21 NOTE — PROGRESS NOTES
Comprehensive Nutrition Assessment    Type and Reason for Visit:  Reassess    Nutrition Recommendations/Plan:   Continue soft and bite sized diet   Will continue to offer diabetic oral nutrition supplement  (Glucerna) with meals to provide 220 kcal and 10 grams of protein each    Assist with meals as needed  Encourage meal and supplement intake  Please document all PO intakes in I/O    Will continue to follow up during stay      Malnutrition Assessment:  Malnutrition Status:  Moderate malnutrition (05/14/24 1428)    Context:  Acute Illness     Findings of the 6 clinical characteristics of malnutrition:  Energy Intake:  50% or less of estimated energy requirements for 5 or more days  Weight Loss:  1% to 2% over 1 week     Body Fat Loss:  Unable to assess     Muscle Mass Loss:  Unable to assess    Fluid Accumulation:  Unable to assess     Strength:  Not Performed    Nutrition Assessment:    Remains on soft and bite sized diet with diabetic oral nutrition supplement offered with meals. Varying intake during stay, 25-75% but most meals intake less than 50%. Noted hx dementia, increased confusion during stay with need for sitter at times. Will continue to follow at high nutrition risk with inadequate intake, slow to improve.    Nutrition Related Findings:    receiving care on visit, discharge plan for [facility]      meds noted: remeron Wound Type: None       Current Nutrition Intake & Therapies:    Average Meal Intake: 1-25%, 51-75%  Average Supplements Intake: Unable to assess  ADULT ORAL NUTRITION SUPPLEMENT; Breakfast, Lunch, Dinner; Diabetic Oral Supplement  ADULT DIET; Dysphagia - Soft and Bite Sized    Anthropometric Measures:  Height: 185.4 cm (6' 1\")  Ideal Body Weight (IBW): 184 lbs (84 kg)    Admission Body Weight: 102.4 kg (225 lb 12 oz)  Current Body Weight: 100.2 kg (220 lb 14.4 oz), 119.8 % IBW. Weight Source: Bed Scale  Current BMI (kg/m2): 29.2  Usual Body Weight: 88.8 kg (195 lb 12.3 oz) (2/1/23)  % Weight  Change (Calculated): 12.6  Weight Adjustment For: No Adjustment                 BMI Categories: Overweight (BMI 25.0-29.9)    Estimated Daily Nutrient Needs:  Energy Requirements Based On: Formula  Weight Used for Energy Requirements: Current  Energy (kcal/day): 2100-2275 (MSJ)  Weight Used for Protein Requirements: Ideal  Protein (g/day):  (1-1.2 g/kg)  Method Used for Fluid Requirements: 1 ml/kcal  Fluid (ml/day): 2227-3312    Nutrition Diagnosis:   Moderate malnutrition, In context of acute illness or injury related to acute injury/trauma, cognitive or neurological impairment (reduced appetite s/s agitation, disorientation, and acute AMS) as evidenced by Criteria as identified in malnutrition assessment (meal intakes of <50% x 7 days, 2.3% wt loss in 1 week)    Nutrition Interventions:   Food and/or Nutrient Delivery: Continue Current Diet, Continue Oral Nutrition Supplement  Nutrition Education/Counseling: Education not appropriate (AMS)  Coordination of Nutrition Care: Continue to monitor while inpatient, Feeding Assistance/Environment Change  Plan of Care discussed with: n/a    Goals:  Previous Goal Met: Progressing toward Goal(s)  Goals: PO intake 50% or greater       Nutrition Monitoring and Evaluation:   Behavioral-Environmental Outcomes: None Identified  Food/Nutrient Intake Outcomes: Diet Advancement/Tolerance, Food and Nutrient Intake, Supplement Intake  Physical Signs/Symptoms Outcomes: Biochemical Data, Skin, Weight, Meal Time Behavior    Discharge Planning:    Continue Oral Nutrition Supplement     Taylor Cardozo RD, LD  Contact: [phone]

## 2024-05-21 NOTE — CONSULTS
[facility] Nurse  Consult Note       Eliazar Ugarte  AGE: 83 y.o.   GENDER: male  : 1940  TODAY'S DATE:  2024    Subjective:     Reason for  Evaluation and Assessment: wound care reassessment.      Eliazar Ugarte is a 83 y.o. male referred by:   [x] Physician  [] Nursing  [] Other:     Wound Identification:  Wound Type: traumatic, moisture, skin tear   Contributing Factors: chronic pressure and decreased mobility        PAST MEDICAL HISTORY        Diagnosis Date    Atrial fibrillation (HCC)     Cerebral artery occlusion with cerebral infarction (HCC)     Diabetes mellitus (HCC)     Hyperlipidemia     Hypertension        PAST SURGICAL HISTORY    History reviewed. No pertinent surgical history.    FAMILY HISTORY    History reviewed. No pertinent family history.    SOCIAL HISTORY    Social History     Tobacco Use    Smoking status: Former     Types: Cigarettes    Smokeless tobacco: Former   Substance Use Topics    Alcohol use: Not Currently    Drug use: Never       ALLERGIES    No Known Allergies    MEDICATIONS    No current facility-administered medications on file prior to encounter.     Current Outpatient Medications on File Prior to Encounter   Medication Sig Dispense Refill    glipiZIDE (GLUCOTROL) 5 MG tablet Take 1 tablet by mouth 2 times daily (before meals)      furosemide (LASIX) 20 MG tablet Take 1 tablet by mouth daily      potassium chloride (KLOR-CON) 20 MEQ packet Take 20 mEq by mouth daily      carboxymethylcellulose 1 % ophthalmic solution Place 1 drop into both eyes 3 times daily      Cholecalciferol (VITAMIN D3) 125 MCG (5000 UT) TABS Take by mouth      divalproex (DEPAKOTE) 500 MG DR tablet Take 1 tablet by mouth at bedtime 90 tablet 3    melatonin 3 MG TABS tablet Take 2 tablets by mouth nightly as needed (for sleep) 30 tablet 0    Probiotic Product (ACIDOPHILUS PEARLS PO) Take 1 capsule by mouth daily      atorvastatin (LIPITOR) 40 MG tablet Take 1 tablet by mouth  nightly      docusate sodium (COLACE) 100 MG capsule Take 1 capsule by mouth daily      apixaban (ELIQUIS) 5 MG TABS tablet Take 1 tablet by mouth 2 times daily      Multiple Vitamins-Minerals (EYE MULTIVITAMIN/SODIUM PO) Take 1 tablet by mouth 2 times daily      Omega-3 Fatty Acids (FISH OIL) 1000 MG capsule Take 1 capsule by mouth daily      omeprazole (PRILOSEC) 20 MG delayed release capsule Take 1 capsule by mouth daily      Calcium Carb-Cholecalciferol (OYSTER SHELL CALCIUM + D PO) Take 1 tablet by mouth daily Receives 500/200 mg tablets      tamsulosin (FLOMAX) 0.4 MG capsule Take 1 capsule by mouth nightly      b complex vitamins capsule Take 1 capsule by mouth daily      nitroGLYCERIN (NITROSTAT) 0.4 MG SL tablet Place 1 tablet under the tongue every 5 minutes as needed for Chest pain up to max of 3 total doses. If no relief after 1 dose, call 911.      polyethylene glycol (GLYCOLAX) 17 g packet Take 1 packet by mouth daily           Objective:      BP (!) 153/95   Pulse 78   Temp 97.4 °F (36.3 °C) (Axillary)   Resp 18   Ht 1.854 m (6' 1\")   Wt 100.2 kg (221 lb)   SpO2 100%   BMI 29.16 kg/m²   Garry Risk Score: Garry Scale Score: 15    LABS    CBC:   Lab Results   Component Value Date/Time    WBC 8.4 05/15/2024 02:08 AM    RBC 4.69 05/15/2024 02:08 AM    HGB 15.0 05/15/2024 02:08 AM    HCT 49.8 05/15/2024 02:08 AM    .2 05/15/2024 02:08 AM    MCH 32.0 05/15/2024 02:08 AM    MCHC 30.1 05/15/2024 02:08 AM    RDW 14.5 05/15/2024 02:08 AM     05/15/2024 02:08 AM    MPV 9.5 05/15/2024 02:08 AM     CMP:    Lab Results   Component Value Date/Time     05/15/2024 02:08 AM    K 4.1 05/15/2024 02:08 AM     05/15/2024 02:08 AM    CO2 19 05/15/2024 02:08 AM    BUN 16 05/15/2024 02:08 AM    CREATININE 1.1 05/15/2024 02:08 AM    LABGLOM 67 05/15/2024 02:08 AM    LABGLOM >60 02/06/2024 05:40 AM    GLUCOSE 107 05/15/2024 02:08 AM    CALCIUM 8.3 05/15/2024 02:08 AM    BILITOT 0.7 05/09/2024  risk for skin breakdown AEB regine. Follow regine orders. Unable to apply atmos air pump no connection. Unable to take pictures camera not working correctly.           Specialty Bed Required : yes  [] Low Air Loss   [x] Pressure Redistribution  [] Fluid Immersion  [] Bariatric  [] Total Pressure Relief  [] Other:     Discharge Plan:  Placement for patient upon discharge: tbd  Hospice Care: no  Patient appropriate for Outpatient Wound Care Center: no    Patient/Caregiver Teaching:  Level of patient/caregiver understanding able to:  not appropriate for teaching.        Electronically signed by Kimi Treadwell RN,  on 5/21/2024 at 12:16 PM

## 2024-05-21 NOTE — CARE COORDINATION
This RN case manager spoke with Dorothy with [facility] admissions this morning. They are able to accept patient once sitter free for 24 hours and precert has been completed.

## 2024-05-22 LAB
EKG ATRIAL RATE: 77 BPM
EKG DIAGNOSIS: NORMAL
EKG P AXIS: 61 DEGREES
EKG P-R INTERVAL: 214 MS
EKG Q-T INTERVAL: 460 MS
EKG QRS DURATION: 170 MS
EKG QTC CALCULATION (BAZETT): 520 MS
EKG R AXIS: -56 DEGREES
EKG T AXIS: 30 DEGREES
EKG VENTRICULAR RATE: 77 BPM
GLUCOSE BLD-MCNC: 116 MG/DL (ref 70–99)
GLUCOSE BLD-MCNC: 134 MG/DL (ref 70–99)
GLUCOSE BLD-MCNC: 138 MG/DL (ref 70–99)
GLUCOSE BLD-MCNC: 139 MG/DL (ref 70–99)

## 2024-05-22 PROCEDURE — 6360000002 HC RX W HCPCS

## 2024-05-22 PROCEDURE — 6370000000 HC RX 637 (ALT 250 FOR IP): Performed by: INTERNAL MEDICINE

## 2024-05-22 PROCEDURE — 97535 SELF CARE MNGMENT TRAINING: CPT

## 2024-05-22 PROCEDURE — 6370000000 HC RX 637 (ALT 250 FOR IP)

## 2024-05-22 PROCEDURE — 1200000000 HC SEMI PRIVATE

## 2024-05-22 PROCEDURE — 2580000003 HC RX 258

## 2024-05-22 PROCEDURE — 82962 GLUCOSE BLOOD TEST: CPT

## 2024-05-22 PROCEDURE — 94761 N-INVAS EAR/PLS OXIMETRY MLT: CPT

## 2024-05-22 PROCEDURE — 97110 THERAPEUTIC EXERCISES: CPT

## 2024-05-22 PROCEDURE — 6370000000 HC RX 637 (ALT 250 FOR IP): Performed by: NURSE PRACTITIONER

## 2024-05-22 PROCEDURE — 99233 SBSQ HOSP IP/OBS HIGH 50: CPT | Performed by: NURSE PRACTITIONER

## 2024-05-22 PROCEDURE — 97530 THERAPEUTIC ACTIVITIES: CPT

## 2024-05-22 PROCEDURE — 6370000000 HC RX 637 (ALT 250 FOR IP): Performed by: STUDENT IN AN ORGANIZED HEALTH CARE EDUCATION/TRAINING PROGRAM

## 2024-05-22 PROCEDURE — 93010 ELECTROCARDIOGRAM REPORT: CPT | Performed by: INTERNAL MEDICINE

## 2024-05-22 RX ORDER — HYDROXYZINE HYDROCHLORIDE 50 MG/ML
25 INJECTION, SOLUTION INTRAMUSCULAR ONCE
Status: COMPLETED | OUTPATIENT
Start: 2024-05-22 | End: 2024-05-22

## 2024-05-22 RX ORDER — LORAZEPAM 2 MG/ML
1 INJECTION INTRAMUSCULAR ONCE
Status: DISCONTINUED | OUTPATIENT
Start: 2024-05-22 | End: 2024-05-22

## 2024-05-22 RX ORDER — LORAZEPAM 2 MG/ML
1 INJECTION INTRAMUSCULAR ONCE
Status: COMPLETED | OUTPATIENT
Start: 2024-05-22 | End: 2024-05-22

## 2024-05-22 RX ORDER — OXYCODONE HYDROCHLORIDE 5 MG/1
5 TABLET ORAL ONCE
Status: COMPLETED | OUTPATIENT
Start: 2024-05-22 | End: 2024-05-22

## 2024-05-22 RX ORDER — DIPHENHYDRAMINE HYDROCHLORIDE 50 MG/ML
50 INJECTION INTRAMUSCULAR; INTRAVENOUS ONCE
Status: COMPLETED | OUTPATIENT
Start: 2024-05-22 | End: 2024-05-22

## 2024-05-22 RX ADMIN — OXYCODONE HYDROCHLORIDE 5 MG: 5 TABLET ORAL at 02:20

## 2024-05-22 RX ADMIN — MIDODRINE HYDROCHLORIDE 5 MG: 5 TABLET ORAL at 12:33

## 2024-05-22 RX ADMIN — DIVALPROEX SODIUM 125 MG: 125 CAPSULE, COATED PELLETS ORAL at 07:59

## 2024-05-22 RX ADMIN — WATER 2.5 MG: 1 INJECTION INTRAMUSCULAR; INTRAVENOUS; SUBCUTANEOUS at 22:28

## 2024-05-22 RX ADMIN — Medication 6 MG: at 22:13

## 2024-05-22 RX ADMIN — DOCUSATE SODIUM 100 MG: 100 CAPSULE, LIQUID FILLED ORAL at 11:00

## 2024-05-22 RX ADMIN — TAMSULOSIN HYDROCHLORIDE 0.4 MG: 0.4 CAPSULE ORAL at 22:13

## 2024-05-22 RX ADMIN — MIDODRINE HYDROCHLORIDE 5 MG: 5 TABLET ORAL at 10:59

## 2024-05-22 RX ADMIN — MIDODRINE HYDROCHLORIDE 5 MG: 5 TABLET ORAL at 18:00

## 2024-05-22 RX ADMIN — HYDROXYZINE HYDROCHLORIDE 25 MG: 50 INJECTION, SOLUTION INTRAMUSCULAR at 02:20

## 2024-05-22 RX ADMIN — ATORVASTATIN CALCIUM 80 MG: 40 TABLET, FILM COATED ORAL at 22:12

## 2024-05-22 RX ADMIN — VITAMIN B COMPLEX 1 CAPSULE: at 11:01

## 2024-05-22 RX ADMIN — DIVALPROEX SODIUM 250 MG: 250 TABLET, DELAYED RELEASE ORAL at 18:16

## 2024-05-22 RX ADMIN — CARBOXYMETHYLCELLULOSE SODIUM 1 DROP: 10 GEL OPHTHALMIC at 00:07

## 2024-05-22 RX ADMIN — ASPIRIN 81 MG: 81 TABLET, CHEWABLE ORAL at 11:00

## 2024-05-22 RX ADMIN — Medication 5000 UNITS: at 10:59

## 2024-05-22 RX ADMIN — POTASSIUM BICARBONATE 20 MEQ: 782 TABLET, EFFERVESCENT ORAL at 11:00

## 2024-05-22 RX ADMIN — APIXABAN 5 MG: 5 TABLET, FILM COATED ORAL at 22:13

## 2024-05-22 RX ADMIN — DIVALPROEX SODIUM 500 MG: 125 CAPSULE, COATED PELLETS ORAL at 22:13

## 2024-05-22 RX ADMIN — APIXABAN 5 MG: 5 TABLET, FILM COATED ORAL at 11:00

## 2024-05-22 RX ADMIN — LORAZEPAM 1 MG: 2 INJECTION INTRAMUSCULAR; INTRAVENOUS at 23:25

## 2024-05-22 RX ADMIN — DIPHENHYDRAMINE HYDROCHLORIDE 50 MG: 50 INJECTION, SOLUTION INTRAMUSCULAR; INTRAVENOUS at 23:26

## 2024-05-22 RX ADMIN — METOPROLOL TARTRATE 25 MG: 25 TABLET, FILM COATED ORAL at 22:13

## 2024-05-22 ASSESSMENT — PAIN SCALES - GENERAL: PAINLEVEL_OUTOF10: 0

## 2024-05-22 ASSESSMENT — PAIN DESCRIPTION - LOCATION
LOCATION: SHOULDER;BACK
LOCATION: SHOULDER;BACK

## 2024-05-22 ASSESSMENT — PAIN SCALES - WONG BAKER
WONGBAKER_NUMERICALRESPONSE: NO HURT

## 2024-05-22 NOTE — PROGRESS NOTES
SLP ALL NOTES        Attempted to see pt this am for diet tolerance monitoring.  RN requested that pt not be awakened.  She reports that he did well with breakfast with no observed difficulty.    Jordyn Singh MA [facility] SLP  Speech Language Pathologist

## 2024-05-22 NOTE — PROGRESS NOTES
V2.0  [facility] [place] [facility]ist Progress Note      Name:  Eliazar Ugarte /Age/Sex: 1940  (83 y.o. male)   MRN & CSN:  [id number] & [id number] Encounter Date/Time: 2024 12:25 PM EDT    Location:  [address] [place] [address]-A PCP: Roberta Schafer       Hospital Day: 16      Subjective:     Chief Complaint: Altered Mental Status, Fall, and Extremity Weakness   Overnight, pt was agitated required doses of hydroxyzine, olanzapine .   This AM pt was also agitated, and aggressive toward but improved after taking the morning Depakote.     Patient seen and evaluated at bedside.  Pt was calm not interactive. Appears calm.        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 Calmer today, .hasn't been taking his oral meds all the time.   -Delirium precautions  -Continue Depakote per psych on 125 daily, 250 w dinner and 500 bed time  -Psych consulted, appreciate recs  -Due to elevated QTc avoid all QTc prolonging medications including antipsychotics     Diffuse pruritic macular rash  -Ongoing for the past many days  -little benefit with barrier treatment/hydration therapy  -ID consulted, no acute interventions indicated     Pneumonia - ruled out  -Chest x-ray-right upper lobe suprahilar airspace disease   -Blood cultures drawn in ED negative  -Procalcitonin 0.199  - received IV vanc+cefepime 7 days; no evidence of any infectious etiology at present   -Follow clinically    Strokelike symptoms - stroke ruled out  -Stroke alert called in ED  -Last well-known-5 PM  -NIHSS could not be done as patient is currently sleeping, not able to be kept awake.  -CT head-no acute intracranial abnormality  -CTA head and neck-atherosclerotic changes noted in the region of the carotid bifurcation bilaterally.  Left greater than the right with approximately 40% diameter stenosis and small focal adherent thrombus seen within the lumen of the proximal left internal carotid  aortic arch anatomy is present. INNOMINATE ARTERY: Normal. RIGHT SUBCLAVIAN ARTERY: Normal. RIGHT VERTEBRAL ARTERY: Right vertebral artery appears occluded distally. RIGHT CAROTID ARTERY: Atherosclerotic calcifications are seen in the region of the carotid bifurcation on the right with mild, approximately 25% diameter narrowing of the proximal right internal carotid artery. LEFT SUBCLAVIAN ARTERY: Normal. LEFT VERTEBRAL ARTERY: Left vertebral artery appears patent and appears to give rise to the basilar artery. Atherosclerotic calcification is seen in the distal left vertebral with atherosclerotic calcification and mild narrowing at the origin. LEFT CAROTID ARTERY: Moderately advanced atherosclerotic calcification is seen in the region of the carotid bifurcation on the left in the proximal left internal carotid artery. There is small adherent thrombus seen within the proximal left internal carotid artery with approximately 40% diameter stenosis. NECK SOFT TISSUES: No neck soft tissue mass or lymphadenopathy. VISUALIZED MEDIASTINUM: No mass or lymphadenopathy. VISUALIZED LUNG APICES: Clear. BONES: No destructive osseous process. OTHER: None.     Atherosclerotic changes seen in the region of the carotid bifurcation bilaterally, left greater the right with approximately 40% diameter stenosis and small focal adherent thrombus seen within the lumen of the proximal left internal carotid artery. 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 Dominant left vertebral artery gives rise to the basilar artery and shows atherosclerotic calcifications both distally and at its origin. PROCEDURE: CT ANGIOGRAPHY HEAD WITH/WITHOUT CONTRAST INDICATION: ams facial droop COMPARISON: none TECHNIQUE: Axial CT imaging obtained through the head prior to and following administration of IV contrast. Axial images, multiplanar reformatted images, and maximum intensity projection images were reviewed for CT angiographic technique. IV contrast: 75  mL Isovue-370. FINDINGS: ANTERIOR CIRCULATION: The intracranial internal carotid arteries, anterior cerebral arteries, and middle cerebral arteries demonstrate no occlusion or stenosis. No evidence for aneurysm or arteriovenous malformation. POSTERIOR CIRCULATION: The distal left vertebral artery, basilar artery and posterior cerebral arteries demonstrate no occlusion or stenosis. No evidence for aneurysm or arteriovenous malformation. INTRACRANIAL VENOUS SYSTEM: There is no significant enhancement seen of the dural venous sinuses. This may be due to early phase of contrast but thrombus is not excluded. Refer to recent noncontrast CT of the head for additional results.. IMPRESSION: No significant intracranial arterial vascular pathology is identified. There is nonenhancement of the dural saphenous sinuses which may be due to early phase of contrast opacification but the possibility of thrombosis cannot be excluded on the basis of this exam. Electronically signed by Shine Ignaico MD    CT CERVICAL SPINE WO CONTRAST    Result Date: 5/7/2024  EXAM: CT CERVICAL SPINE WO CONTRAST INDICATION: Fall COMPARISON: None Technical factors: Non-contrast CT imaging was performed of the cervical spine. Axial and multiplanar reformatted images reviewed.   Individualized dose optimization technique was used in order to meet ALARA standards for radiation dose reduction.  In addition to vendor specific dose reduction algorithms, the dose reduction techniques vary based on the specific scanner utilized but frequently include automated exposure control, adjustment of the mA and/or kV according to patient size, and use of iterative reconstruction technique. Contrast: None FINDINGS: No evidence of acute fracture or traumatic abnormality is seen. Multilevel spondylotic changes seen with uncovertebral spurring at multiple levels noted bilaterally throughout the cervical spine. If concern discogenic disease, MRI correlation would be

## 2024-05-22 NOTE — PROGRESS NOTES
Physical Therapy  Name: Eliazar Ugarte MRN: [id number] :   1940   Date:  2024   Admission Date: 2024 Room:  [address] [place] [address]   Restrictions/Precautions:        General, fall risk, Contact (MRSA)   Communication with other providers:  Laura NORIEGA states pt is ok to see for therapy  Subjective:  Patient states:  '' ouch. I hurt. All of it. ''   Pain:   Location, Type, Intensity (0/10 to 10/10):  cannot quantify  Objective:    Observation:  pt in semi fowlers in bed    Treatment, including education/measures:  Pt presented in room and was in semi fowlers in bed asleep. Extended time to rouse patient. Nurse reports pt was given medication that causes drowsiness/ lethergy. Pt oriented to self. He was poorly positioned in bed and required multiple attempts, and finally maxA x2 to scoot up. Reports laying flat hurts his back. He was able to sit EOB with Lloyd but refused standing, and quickly returned to supine. Extended time required to complete tasks with slow processing. Required several attempt to reposition for comfort once in semi fowlers    Supine Exercises:  Ankle pumps x 15  Quad sets x 15  Glute sets x 15  Heel slides x 10 AAROM  Hip abd x 8  Hip IR/ER x 12  SAQ's x 5  Pillow squeezes x 15        Therapeutic Exercise:  Therapeutic exercises were instructed today.  Cues were given for technique, safety, recruitment, and rationale.  Cues were verbal and/or tactile.    Transfers   Rolling: ModA  Supine to sit :Lloyd  Sit to supine :Lloyd  Scooting :MaxA x2 in supine        Safety  Patient left safely in the bed, with call light/phone in reach with alarm applied. Gait belt was used for transfers and gait.    Assessment / Impression:     Patient's tolerance of treatment:  fair   Adverse Reaction: no  Significant change in status and impact:  no  Barriers to improvement:  cognition, strength impairments     Plan for Next Session:    Will cont to work towards pt's goals per patient tolerance  Time in:  1:06  Time

## 2024-05-22 NOTE — PROGRESS NOTES
Psychiatric Progress Note    Eliazar Ugarte  [id number]  05/22/24    CHIEF COMPLAINT: AMS     HPI: Patient is an 83 year old male with pmhx of  a fib on eliquis, coronary artery disease s/p CABG, hypertension, diabetes mellitus type 2, BEBETO as per documentation, dementia, history of CVA, BPH, congestive heart failure, who was sent from the nursing home via EMS to [facility] ED for confusion, fall, strokelike symptoms on 5/7/24. [facility] tele neurology evaluated patient in ED with score of NIH 8 but intervention not recommended as patient is on anticoagulation. AMS due to multiple reasons including SVT and pneumonia as well as progression of dementia. Patient becoming more confused and agitated through hospital stay. MRI done and negative for acute stroke. Sitter and /or COMPA has been utilized.  Consults include cardiology, EP, neurology, interventional  neuro-Radiology, Urology.. Psychiatry consulted by Dr Ng due to \"AMS\"         Met with patient this am at bedside. He is sleeping and was not disturbed. Patient has been agitated this am but settled down after receiving his morning medicaitons. Insight and judgment are impaired.    Medications Prior to Admission: glipiZIDE (GLUCOTROL) 5 MG tablet, Take 1 tablet by mouth 2 times daily (before meals)  furosemide (LASIX) 20 MG tablet, Take 1 tablet by mouth daily  potassium chloride (KLOR-CON) 20 MEQ packet, Take 20 mEq by mouth daily  carboxymethylcellulose 1 % ophthalmic solution, Place 1 drop into both eyes 3 times daily  Cholecalciferol (VITAMIN D3) 125 MCG (5000 UT) TABS, Take by mouth  [DISCONTINUED] divalproex (DEPAKOTE) 125 MG DR tablet, Take 1 tablet by mouth daily  divalproex (DEPAKOTE) 500 MG DR tablet, Take 1 tablet by mouth at bedtime  melatonin 3 MG TABS tablet, Take 2 tablets by mouth nightly as needed (for sleep)  Probiotic Product (ACIDOPHILUS PEARLS PO), Take 1 capsule by mouth daily  atorvastatin (LIPITOR) 40 MG tablet, Take 1 tablet by mouth nightly  docusate  or suicidal ideations or auditory or visual hallucinations.   Insight: impaired  Judgment impaired  Memory: Immediate, recent, and remote appear impaired, though not formally tested.  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 Recommending avoiding all qtc prolonging medications including antipsychotics.   If agitated could recommend utilizing Depacon 125 mg po or IVPB prn once daily    Standard Delirium precautions:  o Redirect / reorient / reassure the patient  o Early mobilization (please allow patient to walk halls with assistance if needed)   o Reduce noise and provide adequate daytime light in the room; eula-side room preferable if available  o Prevent sleep deprivation  o Minimize use of anticholinergic drugs, benzodiazepines, and narcotics  o Use of eyeglasses and hearing aids  o Treat volume depletion  o Bowel regimens  o Avoid lines or catheters if possible  o Monitor for unintentional self-harm  o Assess fall risk    Labs and EKG reviewed  5/10 EKG qtc  536   5/15 EKG QTC  565  5/20 EKG   5/15 valproic acid level 15.0- most likely patient has not been getting Depakote due to low level  5/15 hemoglobin 15, platelets 197, Na+ 139, K+ 4.1, BUN 16, cr 1.1, est glom filt rate 67  5/14 UA trace of leukocyte esterase,   Psychiatric management:medication initiation and titration, group and individual therapy, safe and theraputic environment.  Status of problem/condition: ?Improving  Medical co-morbidities: [facility], appreciate  assistance  Legal Status:Pending  Disposition:estimated LOS: Pending.  The treatment team reviewed with the patient the diagnosis and treatment recommendations to include the risks, benefits, and side effects of chosen medications.  The patient verbalized understanding and agreed with the treatment regimen as outlined above.  The patient was encouraged to participate in groups.  Medical records, Labs, Diagnotic tests reviewed  q15 min safety checks for safety  Interval History.  Review current labs  Continue current medications  Supportive Therapy Provided  Pt had an opportunity to ask questions and address concerns  Pt encouraged to continue therapy group or individual.  Pt was in agreement with treatment plan.  The risks benefits and side effects of medications were discussed with the patient, including alternatives and no treatment.    Electronically signed by KALI Law CNP on 5/22/2024 at 12:44 PM

## 2024-05-22 NOTE — PLAN OF CARE
Problem: Safety - Adult  Goal: Free from fall injury  Outcome: Progressing     Problem: Discharge Planning  Goal: Discharge to home or other facility with appropriate resources  Outcome: Progressing     Problem: Pain  Goal: Verbalizes/displays adequate comfort level or baseline comfort level  Outcome: Progressing     Problem: Skin/Tissue Integrity  Goal: Absence of new skin breakdown  Outcome: Progressing     Problem: ABCDS Injury Assessment  Goal: Absence of physical injury  Outcome: Progressing     Problem: Chronic Conditions and Co-morbidities  Goal: Patient's chronic conditions and co-morbidity symptoms are monitored and maintained or improved  Outcome: Progressing     Problem: Nutrition Deficit:  Goal: Optimize nutritional status  Outcome: Progressing  Flowsheets (Taken 5/21/2024 1434 by Taylor Cardozo, RD, LD)  Nutrient intake appropriate for improving, restoring, or maintaining nutritional needs:   Assess nutritional status and recommend course of action   Monitor oral intake, labs, and treatment plans   Recommend appropriate diets, oral nutritional supplements, and vitamin/mineral supplements

## 2024-05-22 NOTE — PROGRESS NOTES
Occupational Therapy    Occupational Therapy Treatment Note    Name: Eliazar Ugarte MRN: [id number] :   1940   Date:  2024   Admission Date: 2024 Room:  [address] [place] [address]-A     Primary Problem:  Acute encephalopathy     Restrictions/Precautions:  General, fall risk, Contact (MRSA)     Communication with other providers: LPN, STNA in during session     Subjective:  Patient states: \"I'm just saying ouch, I don't mean it\"  Pain: Denied     Objective:    Observation: Pt side lying in bed, LPN at bedside providing medication, agreeable to therapy.  Objective Measures:  On room air     Treatment, including education:  Therapeutic Activity Training:   Therapeutic activity training was instructed today.  Cues were given for safety, sequence, UE/LE placement, awareness, and balance.    Activities performed today included bed mobility training, sup-sit, sit-stand, functional mobility   Self Care Training:   Cues were given for safety, sequence, UE/LE placement, visual cues, and balance.    Activities performed today included UB/LB dressing tasks, toileting, hand hygiene at sink    Pt received semi fowlers in bed, agreeable to therapy. Pt provided w/ re-education on the importance of therapy and updated on current plan of care. Pt washed face /w cloth supine in bed w/ SBA and verbal cues. Pt donned hospital gown w/ Sumaya to manage arms. Pt performed sup to sit w/ modA to bring torso upright. Pt required maxA to don socks while seated EOB. Pt stood from EOB to RW w/ modA. Pt performed approx 15ft functional mobility to bathroom CGA. Pt sat to toilet w/ Sumaya for safe eccentric control. Pt required maxA for rena hygiene. Pt stood from commode to RW w/ maxA. Pt performed 15ft functional mobility back to bed w/ RW CGA. Pt sat to EOB CGA and performed sit to sup w/ Sumaya. Pt required set up assist for lunch but noted to be asleep before able to initiate feeding. Pt positioned in bed for comfort w/ all needs met, bed alarm

## 2024-05-23 VITALS
BODY MASS INDEX: 29.29 KG/M2 | TEMPERATURE: 98.2 F | DIASTOLIC BLOOD PRESSURE: 99 MMHG | SYSTOLIC BLOOD PRESSURE: 128 MMHG | HEIGHT: 73 IN | HEART RATE: 56 BPM | WEIGHT: 221 LBS | RESPIRATION RATE: 18 BRPM | OXYGEN SATURATION: 96 %

## 2024-05-23 LAB
GLUCOSE BLD-MCNC: 113 MG/DL (ref 70–99)
GLUCOSE BLD-MCNC: 157 MG/DL (ref 70–99)

## 2024-05-23 PROCEDURE — 6370000000 HC RX 637 (ALT 250 FOR IP): Performed by: STUDENT IN AN ORGANIZED HEALTH CARE EDUCATION/TRAINING PROGRAM

## 2024-05-23 PROCEDURE — 94761 N-INVAS EAR/PLS OXIMETRY MLT: CPT

## 2024-05-23 PROCEDURE — 6370000000 HC RX 637 (ALT 250 FOR IP): Performed by: NURSE PRACTITIONER

## 2024-05-23 PROCEDURE — 6370000000 HC RX 637 (ALT 250 FOR IP): Performed by: INTERNAL MEDICINE

## 2024-05-23 PROCEDURE — 99233 SBSQ HOSP IP/OBS HIGH 50: CPT | Performed by: NURSE PRACTITIONER

## 2024-05-23 PROCEDURE — 93005 ELECTROCARDIOGRAM TRACING: CPT | Performed by: NURSE PRACTITIONER

## 2024-05-23 PROCEDURE — 82962 GLUCOSE BLOOD TEST: CPT

## 2024-05-23 RX ADMIN — METOPROLOL TARTRATE 25 MG: 25 TABLET, FILM COATED ORAL at 09:10

## 2024-05-23 RX ADMIN — DIVALPROEX SODIUM 125 MG: 125 CAPSULE, COATED PELLETS ORAL at 09:11

## 2024-05-23 RX ADMIN — MIDODRINE HYDROCHLORIDE 5 MG: 5 TABLET ORAL at 09:11

## 2024-05-23 RX ADMIN — DOCUSATE SODIUM 100 MG: 100 CAPSULE, LIQUID FILLED ORAL at 09:11

## 2024-05-23 RX ADMIN — APIXABAN 5 MG: 5 TABLET, FILM COATED ORAL at 09:10

## 2024-05-23 RX ADMIN — POTASSIUM BICARBONATE 20 MEQ: 782 TABLET, EFFERVESCENT ORAL at 09:10

## 2024-05-23 RX ADMIN — VITAMIN B COMPLEX 1 CAPSULE: at 09:11

## 2024-05-23 RX ADMIN — CARBOXYMETHYLCELLULOSE SODIUM 1 DROP: 10 GEL OPHTHALMIC at 09:11

## 2024-05-23 RX ADMIN — FUROSEMIDE 20 MG: 20 TABLET ORAL at 09:10

## 2024-05-23 RX ADMIN — MICONAZOLE NITRATE: 2 POWDER TOPICAL at 09:11

## 2024-05-23 RX ADMIN — POLYETHYLENE GLYCOL (3350) 17 G: 17 POWDER, FOR SOLUTION ORAL at 09:11

## 2024-05-23 RX ADMIN — ASPIRIN 81 MG: 81 TABLET, CHEWABLE ORAL at 09:11

## 2024-05-23 RX ADMIN — Medication 5000 UNITS: at 09:10

## 2024-05-23 ASSESSMENT — PAIN SCALES - WONG BAKER
WONGBAKER_NUMERICALRESPONSE: NO HURT

## 2024-05-23 ASSESSMENT — PAIN SCALES - GENERAL: PAINLEVEL_OUTOF10: 0

## 2024-05-23 NOTE — DISCHARGE SUMMARY
Discharge Summary    Name:  Eliazar Ugarte /Age/Sex: 1940  (83 y.o. male)   MRN & CSN:  [id number] & [id number] Admission Date/Time: 2024  7:19 PM   Attending:  Nolan Timmons MD Discharging Physician: Nolan Timmons MD     Hospital Course:   Eliazar Ugarte is a 83 y.o.  male  who presents with Acute encephalopathy    HPI  \"Eliazar Ugarte is a 83 y.o. male with coronary artery disease s/p CABG, hypertension, diabetes mellitus type 2, BEBETO as per documentation, dementia, history of CVA, BPH, congestive heart failure, who was sent from the nursing home for confusion, fall, strokelike symptoms.  Patient is currently in a deep sleep, was hard to wake him up.  Did not follow any commands.  Most of the information was on review of records.  Vitals on arrival /94, , RR 16, temp 100.3, saturating 96% on 4 L of oxygen.  Labs significant for sodium 131, chloride 96, creatinine 1.5, random glucose 204, procalcitonin 0.199, troponin 93, proBNP 2005, LFTs within normal range, WBC 12, UA not suggestive of infection.  Rapid COVID, influenza a and B-.  VBG-pH 7.37, pCO2 43, pO2 61, HCO3 24.9.  CT head, CTA head and neck, CT C-spine, chest x-ray done NAD.  Stroke alert called in ED, not a TNK candidate as per [facility] stroke team.\"       The following problems have been addressed during this hospitalization.  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 Calmer today.  -Continue Depakote per psych on 125 daily, 250 w dinner and 500 bed time  -Psych consulted, appreciate recs  -Due to elevated QTc avoid all QTc prolonging medications including antipsychotics (QTC  537)      Diffuse pruritic macular rash  -Ongoing for the past many days  -little benefit with barrier treatment/hydration therapy  -ID consulted, no acute interventions indicated      Pneumonia - ruled out  -Chest x-ray-right upper lobe suprahilar airspace disease  98.2 °F (36.8 °C)   SpO2: 96%       General: NAD  Eyes: EOMI  ENT: neck supple  Cardiovascular: Regular rate.  Respiratory: Clear to auscultation  Gastrointestinal: Soft, non tender  Genitourinary: no suprapubic tenderness  Musculoskeletal: No edema  Skin: warm, dry  Neuro: Alert.  Psych: Mood appropriate.     The patient expressed appropriate understanding of and agreement with the discharge recommendations, medications, and plan.     Consults this admission:  IP CONSULT TO NEUROLOGY  IP CONSULT TO CARDIOLOGY  IP CONSULT TO NEUROINTERVENTIONAL SURGERY  IP CONSULT TO INTERVENTIONAL RADIOLOGY  IP CONSULT TO PHARMACY  IP CONSULT TO IV TEAM  IP CONSULT TO HOME CARE NEEDS  IP CONSULT TO UROLOGY  IP CONSULT TO PSYCHIATRY  IP CONSULT TO IV TEAM  IP CONSULT TO INFECTIOUS DISEASES      Discharge Instruction:   Handoff to PCP:   - f/u cardiology, urology    Follow up appointments: SNF  Primary care physician: Roberta Schafer      Diet: ADULT ORAL NUTRITION SUPPLEMENT; Breakfast, Lunch, Dinner; Diabetic Oral Supplement  ADULT DIET; Dysphagia - Soft and Bite Sized  Activity: activity as tolerated  Disposition: Discharged to:    []Home, [][facility], [x]SNF, []Acute Rehab, []Hospice   Condition on discharge: Stable    Discharge Medications:        Medication List        START taking these medications      aspirin 81 MG chewable tablet  Take 1 tablet by mouth daily     metoprolol tartrate 25 MG tablet  Commonly known as: LOPRESSOR  Take 1 tablet by mouth 2 times daily     midodrine 5 MG tablet  Commonly known as: PROAMATINE  Take 1 tablet by mouth 3 times daily (with meals)     mupirocin 2 % ointment  Commonly known as: BACTROBAN  Apply to both nostrils twice daily for 5 days            CHANGE how you take these medications      * divalproex 500 MG DR tablet  Commonly known as: DEPAKOTE  Take 1 tablet by mouth at bedtime  What changed: Another medication with the same name was added. Make sure you understand how and when to take

## 2024-05-23 NOTE — PROGRESS NOTES
Psychiatric Progress Note    Eliazar Ugarte  [id number]  05/23/24    CHIEF COMPLAINT: AMS     HPI: Patient is an 83 year old male with pmhx of  a fib on eliquis, coronary artery disease s/p CABG, hypertension, diabetes mellitus type 2, BEBETO as per documentation, dementia, history of CVA, BPH, congestive heart failure, who was sent from the nursing home via EMS to [facility] ED for confusion, fall, strokelike symptoms on 5/7/24. [facility] tele neurology evaluated patient in ED with score of NIH 8 but intervention not recommended as patient is on anticoagulation. AMS due to multiple reasons including SVT and pneumonia as well as progression of dementia. Patient becoming more confused and agitated through hospital stay. MRI done and negative for acute stroke. Sitter and /or COMPA has been utilized.  Consults include cardiology, EP, neurology, interventional  neuro-Radiology, Urology.. Psychiatry consulted by Dr Ng due to \"AMS\"         Met with patient this am at bedside. He is somnolent, not talking and unable to drink water from a straw due to receiving ativan and antipsychotic earlier in the morning. Discussed in detail with his nurse and charge concerns.  Mainor Rn notes yesterday he did much better getting up in a chair and was more redirectable utilizing non pharmacological techniques. Insight and judgment are impaired.    Medications Prior to Admission: glipiZIDE (GLUCOTROL) 5 MG tablet, Take 1 tablet by mouth 2 times daily (before meals)  furosemide (LASIX) 20 MG tablet, Take 1 tablet by mouth daily  potassium chloride (KLOR-CON) 20 MEQ packet, Take 20 mEq by mouth daily  carboxymethylcellulose 1 % ophthalmic solution, Place 1 drop into both eyes 3 times daily  Cholecalciferol (VITAMIN D3) 125 MCG (5000 UT) TABS, Take by mouth  [DISCONTINUED] divalproex (DEPAKOTE) 125 MG DR tablet, Take 1 tablet by mouth daily  divalproex (DEPAKOTE) 500 MG DR tablet, Take 1 tablet by mouth at bedtime  melatonin 3 MG TABS tablet, Take 2 tablets  by mouth nightly as needed (for sleep)  Probiotic Product (ACIDOPHILUS PEARLS PO), Take 1 capsule by mouth daily  atorvastatin (LIPITOR) 40 MG tablet, Take 1 tablet by mouth nightly  docusate sodium (COLACE) 100 MG capsule, Take 1 capsule by mouth daily  apixaban (ELIQUIS) 5 MG TABS tablet, Take 1 tablet by mouth 2 times daily  Multiple Vitamins-Minerals (EYE MULTIVITAMIN/SODIUM PO), Take 1 tablet by mouth 2 times daily  Omega-3 Fatty Acids (FISH OIL) 1000 MG capsule, Take 1 capsule by mouth daily  omeprazole (PRILOSEC) 20 MG delayed release capsule, Take 1 capsule by mouth daily  Calcium Carb-Cholecalciferol (OYSTER SHELL CALCIUM + D PO), Take 1 tablet by mouth daily Receives 500/200 mg tablets  tamsulosin (FLOMAX) 0.4 MG capsule, Take 1 capsule by mouth nightly  b complex vitamins capsule, Take 1 capsule by mouth daily  nitroGLYCERIN (NITROSTAT) 0.4 MG SL tablet, Place 1 tablet under the tongue every 5 minutes as needed for Chest pain up to max of 3 total doses. If no relief after 1 dose, call 911.  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, without behavioral disturbance, severe (HCC)    Other sequelae of other cerebrovascular disease    KALLIE (generalized anxiety disorder)    Acute encephalopathy    Altered mental status    Atrial fibrillation with RVR (HCC)    Pneumonia due to infectious organism    Thrombus    Rash and nonspecific skin eruption       Review of Systems    OBJECTIVE  Vital Signs:  Vitals:    05/23/24 0900   BP: (!) 128/99   Pulse: 56   Resp: 18   Temp: 98.2 °F (36.8 °C)   SpO2: 96%       Labs:  Recent  circumstantiality. No flight of ideas or loosening of associations.  Thought Content/Perceptions: Pt is limited in response at this time.  Pt does not display any homicidal or suicidal ideations or auditory or visual hallucinations.   Insight: impaired  Judgment impaired  Memory: Immediate, recent, and remote appear impaired, though not formally tested.  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   If agitated could recommend utilizing Depacon 125 mg po or IVPB prn once daily    Standard Delirium precautions:  o Redirect / reorient / reassure the patient  o Early mobilization (please allow patient to walk halls with assistance if needed)   o Reduce noise and provide adequate daytime light in the room; eula-side room preferable if available  o Prevent sleep deprivation  o Minimize use of anticholinergic drugs, benzodiazepines, and narcotics  o Use of eyeglasses and hearing aids  o Treat volume depletion  o Bowel regimens  o Avoid lines or catheters if possible  o Monitor for unintentional self-harm  o Assess fall risk    Labs and EKG reviewed  5/10 EKG qtc  536   5/15 EKG QTC  565  5/20 EKG   5/23 EKG   5/15 valproic acid level 15.0- most likely patient has not been getting Depakote due to low level  5/15 hemoglobin 15, platelets 197, Na+ 139, K+ 4.1, BUN 16, cr 1.1, est glom filt rate 67  5/14 UA trace of  leukocyte esterase,   PS to DR Timmons regarding recommendations  Psychiatric management:medication initiation and titration, group and individual therapy, safe and theraputic environment.  Status of problem/condition: ?Improving  Medical co-morbidities: [facility], appreciate assistance  Legal Status:Pending  Disposition:estimated LOS: Pending.  The treatment team reviewed with the patient the diagnosis and treatment recommendations to include the risks, benefits, and side effects of chosen medications.  The patient verbalized understanding and agreed with the treatment regimen as outlined above.  The patient was encouraged to participate in groups.  Medical records, Labs, Diagnotic tests reviewed  q15 min safety checks for safety  Interval History.  Review current labs  Continue current medications  Supportive Therapy Provided  Pt had an opportunity to ask questions and address concerns  Pt encouraged to continue therapy group or individual.  Pt was in agreement with treatment plan.  The risks benefits and side effects of medications were discussed with the patient, including alternatives and no treatment.    Electronically signed by KALI Law CNP on 5/23/2024 at 12:19 PM

## 2024-05-23 NOTE — CARE COORDINATION
Discharge order noted. Per primary RN transport arranged for 2pm  w/ [facility]. Voicemail left for [facility] admissions, to update her with discharge time.

## 2024-05-23 NOTE — PROGRESS NOTES
V2.0  [facility] Hospitalist Progress Note      Name:  Eliazar Ugarte /Age/Sex: 1940  (83 y.o. male)   MRN & CSN:  [id number] & [id number] Encounter Date/Time: 2024 12:25 PM EDT    [facility]: Roberta Schafer       [facility] Day: 17      Subjective:     Chief Complaint: Altered Mental Status, Fall, and Extremity Weakness    Overnight, pt was agitated required doses of Ativan and Benadryl.  Patient seen and evaluated at bedside.  Pt was calm not interactive; not answering questions.     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 Calmer today, .hasn't been taking his oral meds all the time.   -Delirium precautions  -Continue Depakote per psych on 125 daily, 250 w dinner and 500 bed time  -Psych consulted, appreciate recs  -Due to elevated QTc avoid all QTc prolonging medications including antipsychotics (QTC  537)     Diffuse pruritic macular rash  -Ongoing for the past many days  -little benefit with barrier treatment/hydration therapy  -ID consulted, no acute interventions indicated     Pneumonia - ruled out  -Chest x-ray-right upper lobe suprahilar airspace disease   -Blood cultures drawn in ED negative  -Procalcitonin 0.199  - received IV vanc+cefepime 7 days; no evidence of any infectious etiology at present   -Follow clinically    Strokelike symptoms - stroke ruled out  -Stroke alert called in ED  -Last well-known-5 PM  -NIHSS could not be done as patient is currently sleeping, not able to be kept awake.  -CT head-no acute intracranial abnormality  -CTA head and neck-atherosclerotic changes noted in the region of the carotid bifurcation bilaterally.  Left greater than the right with approximately 40% diameter stenosis and small focal adherent thrombus seen within the lumen of the proximal left internal carotid artery.  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  -Patient was not deemed a TNK  continued admission due to pre-cert   Surrogate Decision Maker/ POA      Personally reviewed Lab Studies and Imaging     EKG interpreted personally and results no acute ST changes    Review of Systems:    Review of Systems   Unable to perform ROS: Mental status change         Objective:   No intake or output data in the 24 hours ending 05/23/24 1038       Vitals:   Vitals:    05/23/24 0900   BP: (!) 128/99   Pulse: 56   Resp: 18   Temp: 98.2 °F (36.8 °C)   SpO2: 96%       Physical Exam:     Physical Exam  Constitutional:       General: He is not in acute distress.     Appearance: Normal appearance.   HENT:      Head: Normocephalic and atraumatic.      Nose: Nose normal.      Mouth/Throat:      Mouth: Mucous membranes are moist.      Pharynx: Oropharynx is clear.   Eyes:      Extraocular Movements: Extraocular movements intact.      Conjunctiva/sclera: Conjunctivae normal.      Pupils: Pupils are equal, round, and reactive to light.   Cardiovascular:      Rate and Rhythm: Normal rate and regular rhythm.      Pulses: Normal pulses.      Heart sounds: Normal heart sounds.   Pulmonary:      Effort: Pulmonary effort is normal.      Breath sounds: No rhonchi.   Abdominal:      General: Abdomen is flat. Bowel sounds are normal.      Palpations: Abdomen is soft.   Musculoskeletal:         General: Normal range of motion.      Cervical back: Normal range of motion and neck supple.      Comments: Right lower chest ecchymosis   Skin:     General: Skin is warm and dry.      Findings: Rash present.      Comments: Rash on back, macular   Neurological:      General: No focal deficit present.      Mental Status: He is alert and oriented to person, place, and time. Mental status is at baseline.   Psychiatric:         Mood and Affect: Mood normal.         Behavior: Behavior normal.         Thought Content: Thought content normal.         Medications:   Medications:    valproate  125 mg IntraVENous Daily    Or    divalproex  125 mg

## 2024-05-23 NOTE — PLAN OF CARE
Problem: Safety - Adult  Goal: Free from fall injury  5/23/2024 1155 by Haley Ding RN  Outcome: Adequate for Discharge  5/23/2024 0957 by Terry Garvey RN  Outcome: Progressing  5/23/2024 0957 by Terry Garvey RN  Outcome: Progressing  5/23/2024 0016 by Hazel Orr LPN  Outcome: Progressing     Problem: Discharge Planning  Goal: Discharge to home or other facility with appropriate resources  5/23/2024 1155 by Haley Ding RN  Outcome: Adequate for Discharge  5/23/2024 0957 by Terry Garvey RN  Outcome: Progressing  5/23/2024 0957 by Terry Garvey RN  Outcome: Progressing  5/23/2024 0016 by Hazel Orr LPN  Outcome: Progressing     Problem: Pain  Goal: Verbalizes/displays adequate comfort level or baseline comfort level  5/23/2024 1155 by Haley Ding RN  Outcome: Adequate for Discharge  5/23/2024 0957 by Terry Garvey RN  Outcome: Progressing  5/23/2024 0957 by Terry Garvey RN  Outcome: Progressing  5/23/2024 0016 by Hazel Orr LPN  Outcome: Progressing     Problem: Skin/Tissue Integrity  Goal: Absence of new skin breakdown  Description: 1.  Monitor for areas of redness and/or skin breakdown  2.  Assess vascular access sites hourly  3.  Every 4-6 hours minimum:  Change oxygen saturation probe site  4.  Every 4-6 hours:  If on nasal continuous positive airway pressure, respiratory therapy assess nares and determine need for appliance change or resting period.  5/23/2024 1155 by Haley Ding RN  Outcome: Adequate for Discharge  5/23/2024 0957 by Terry Garvey RN  Outcome: Progressing  5/23/2024 0957 by Terry Garvey RN  Outcome: Progressing  5/23/2024 0016 by Hazel Orr LPN  Outcome: Progressing     Problem: ABCDS Injury Assessment  Goal: Absence of physical injury  5/23/2024 1155 by Haley Ding RN  Outcome: Adequate for Discharge  5/23/2024 0957 by Terry Garvey RN  Outcome: Progressing  5/23/2024 0957 by Guru,

## 2024-05-23 NOTE — PROGRESS NOTES
Occupational Therapy  Attempted to see pt this AM with nursing recommended holding at this time d/t pt receiving multiple medications last night to help him relax and pt is currently resting. Will continue to follow as able/appropriate.     Radha VASQUEZ

## 2024-05-24 ENCOUNTER — HOSPITAL ENCOUNTER (OUTPATIENT)
Age: 84
Setting detail: SPECIMEN
Discharge: HOME | End: 2024-05-24

## 2024-05-24 ENCOUNTER — TELEPHONE (OUTPATIENT)
Dept: CARDIOLOGY CLINIC | Age: 84
End: 2024-05-24

## 2024-05-24 LAB
ALBUMIN SERPL-MCNC: 3.1 GM/DL (ref 3.4–5)
ALP BLD-CCNC: 114 IU/L (ref 40–129)
ALT SERPL-CCNC: 30 U/L (ref 10–40)
ANION GAP SERPL CALCULATED.3IONS-SCNC: 15 MMOL/L (ref 7–16)
AST SERPL-CCNC: 33 IU/L (ref 15–37)
BILIRUB SERPL-MCNC: 0.7 MG/DL (ref 0–1)
BUN SERPL-MCNC: 29 MG/DL (ref 6–23)
CALCIUM SERPL-MCNC: 8.5 MG/DL (ref 8.3–10.6)
CHLORIDE BLD-SCNC: 104 MMOL/L (ref 99–110)
CO2: 24 MMOL/L (ref 21–32)
CREAT SERPL-MCNC: 1.5 MG/DL (ref 0.9–1.3)
ESTIMATED AVERAGE GLUCOSE: 171 MG/DL
GFR, ESTIMATED: 46 ML/MIN/1.73M2
GLUCOSE SERPL-MCNC: 107 MG/DL (ref 70–99)
HBA1C MFR BLD: 7.6 % (ref 4.2–6.3)
HCT VFR BLD CALC: 43.8 % (ref 42–52)
HEMOGLOBIN: 13.6 GM/DL (ref 13.5–18)
MCH RBC QN AUTO: 31.3 PG (ref 27–31)
MCHC RBC AUTO-ENTMCNC: 31.1 % (ref 32–36)
MCV RBC AUTO: 100.9 FL (ref 78–100)
PDW BLD-RTO: 14.7 % (ref 11.7–14.9)
PLATELET # BLD: 189 K/CU MM (ref 140–440)
PMV BLD AUTO: 10.1 FL (ref 7.5–11.1)
POTASSIUM SERPL-SCNC: 4.2 MMOL/L (ref 3.5–5.1)
RBC # BLD: 4.34 M/CU MM (ref 4.6–6.2)
SODIUM BLD-SCNC: 143 MMOL/L (ref 135–145)
TOTAL PROTEIN: 5.9 GM/DL (ref 6.4–8.2)
WBC # BLD: 5.6 K/CU MM (ref 4–10.5)

## 2024-05-24 PROCEDURE — 36415 COLL VENOUS BLD VENIPUNCTURE: CPT

## 2024-05-24 PROCEDURE — 83036 HEMOGLOBIN GLYCOSYLATED A1C: CPT

## 2024-05-24 PROCEDURE — 80053 COMPREHEN METABOLIC PANEL: CPT

## 2024-05-24 PROCEDURE — 85027 COMPLETE CBC AUTOMATED: CPT

## 2024-05-24 NOTE — TELEPHONE ENCOUNTER
Called Haley at [facility], advised Haley per Savannah Barnes CNP that at this time patient does not see EP that she should have a f/u with cardiology. Haley stated she would call [facility].

## 2024-05-25 LAB
EKG ATRIAL RATE: 69 BPM
EKG DIAGNOSIS: NORMAL
EKG P AXIS: 66 DEGREES
EKG P-R INTERVAL: 230 MS
EKG Q-T INTERVAL: 490 MS
EKG QRS DURATION: 174 MS
EKG QTC CALCULATION (BAZETT): 525 MS
EKG R AXIS: -59 DEGREES
EKG T AXIS: 29 DEGREES
EKG VENTRICULAR RATE: 69 BPM

## 2024-05-25 PROCEDURE — 93010 ELECTROCARDIOGRAM REPORT: CPT | Performed by: INTERNAL MEDICINE

## 2024-06-04 ENCOUNTER — HOSPITAL ENCOUNTER (OUTPATIENT)
Age: 84
Setting detail: SPECIMEN
Discharge: HOME | End: 2024-06-04

## 2024-06-04 LAB
ANION GAP SERPL CALCULATED.3IONS-SCNC: 15 MMOL/L (ref 7–16)
BUN SERPL-MCNC: 23 MG/DL (ref 6–23)
CALCIUM SERPL-MCNC: 8.4 MG/DL (ref 8.3–10.6)
CHLORIDE BLD-SCNC: 99 MMOL/L (ref 99–110)
CO2: 26 MMOL/L (ref 21–32)
CREAT SERPL-MCNC: 1.7 MG/DL (ref 0.9–1.3)
GFR, ESTIMATED: 40 ML/MIN/1.73M2
GLUCOSE SERPL-MCNC: 138 MG/DL (ref 70–99)
POTASSIUM SERPL-SCNC: 4.2 MMOL/L (ref 3.5–5.1)
SODIUM BLD-SCNC: 140 MMOL/L (ref 135–145)

## 2024-06-04 PROCEDURE — 80048 BASIC METABOLIC PNL TOTAL CA: CPT

## 2024-06-04 PROCEDURE — 36415 COLL VENOUS BLD VENIPUNCTURE: CPT

## 2024-06-07 ENCOUNTER — HOSPITAL ENCOUNTER (OUTPATIENT)
Age: 84
Setting detail: SPECIMEN
Discharge: HOME | End: 2024-06-07

## 2024-06-07 LAB
DOSE AMOUNT: ABNORMAL
DOSE TIME: ABNORMAL
VALPROIC ACID LEVEL: 29.3 UG/ML (ref 50–100)

## 2024-06-07 PROCEDURE — 9900360100 HC STAT COLLECTION FEE SNF

## 2024-06-07 PROCEDURE — 36415 COLL VENOUS BLD VENIPUNCTURE: CPT

## 2024-06-07 PROCEDURE — 80164 ASSAY DIPROPYLACETIC ACD TOT: CPT

## 2024-06-17 ENCOUNTER — HOSPITAL ENCOUNTER (OUTPATIENT)
Age: 84
Setting detail: SPECIMEN
Discharge: HOME | End: 2024-06-17
Payer: MEDICARE

## 2024-06-17 LAB
ALBUMIN SERPL-MCNC: 3.4 GM/DL (ref 3.4–5)
ALP BLD-CCNC: 118 IU/L (ref 40–128)
ALT SERPL-CCNC: 47 U/L (ref 10–40)
ANION GAP SERPL CALCULATED.3IONS-SCNC: 14 MMOL/L (ref 7–16)
AST SERPL-CCNC: 45 IU/L (ref 15–37)
BILIRUB SERPL-MCNC: 0.5 MG/DL (ref 0–1)
BUN SERPL-MCNC: 19 MG/DL (ref 6–23)
CALCIUM SERPL-MCNC: 8.4 MG/DL (ref 8.3–10.6)
CHLORIDE BLD-SCNC: 104 MMOL/L (ref 99–110)
CO2: 23 MMOL/L (ref 21–32)
CREAT SERPL-MCNC: 1.4 MG/DL (ref 0.9–1.3)
GFR, ESTIMATED: 50 ML/MIN/1.73M2
GLUCOSE SERPL-MCNC: 85 MG/DL (ref 70–99)
HCT VFR BLD CALC: 35.7 % (ref 42–52)
HEMOGLOBIN: 11.5 GM/DL (ref 13.5–18)
MCH RBC QN AUTO: 32.1 PG (ref 27–31)
MCHC RBC AUTO-ENTMCNC: 32.2 % (ref 32–36)
MCV RBC AUTO: 99.7 FL (ref 78–100)
PDW BLD-RTO: 15.2 % (ref 11.7–14.9)
PLATELET # BLD: 144 K/CU MM (ref 140–440)
PMV BLD AUTO: 9.7 FL (ref 7.5–11.1)
POTASSIUM SERPL-SCNC: 4.3 MMOL/L (ref 3.5–5.1)
RBC # BLD: 3.58 M/CU MM (ref 4.6–6.2)
SODIUM BLD-SCNC: 141 MMOL/L (ref 135–145)
TOTAL PROTEIN: 6.1 GM/DL (ref 6.4–8.2)
WBC # BLD: 5.7 K/CU MM (ref 4–10.5)

## 2024-06-17 PROCEDURE — 36415 COLL VENOUS BLD VENIPUNCTURE: CPT

## 2024-06-17 PROCEDURE — 80053 COMPREHEN METABOLIC PANEL: CPT

## 2024-06-17 PROCEDURE — 85027 COMPLETE CBC AUTOMATED: CPT

## 2024-06-18 ENCOUNTER — APPOINTMENT (OUTPATIENT)
Dept: CT IMAGING | Age: 84
End: 2024-06-18
Payer: MEDICARE

## 2024-06-18 ENCOUNTER — APPOINTMENT (OUTPATIENT)
Dept: GENERAL RADIOLOGY | Age: 84
End: 2024-06-18
Payer: MEDICARE

## 2024-06-18 ENCOUNTER — HOSPITAL ENCOUNTER (EMERGENCY)
Age: 84
Discharge: PSYCHIATRIC OR SUBSTANCE USE FACILITY | End: 2024-06-18
Attending: STUDENT IN AN ORGANIZED HEALTH CARE EDUCATION/TRAINING PROGRAM
Payer: MEDICARE

## 2024-06-18 VITALS
WEIGHT: 221 LBS | DIASTOLIC BLOOD PRESSURE: 93 MMHG | OXYGEN SATURATION: 96 % | RESPIRATION RATE: 17 BRPM | BODY MASS INDEX: 29.29 KG/M2 | HEIGHT: 73 IN | SYSTOLIC BLOOD PRESSURE: 141 MMHG | TEMPERATURE: 98.2 F | HEART RATE: 92 BPM

## 2024-06-18 DIAGNOSIS — R46.89 AGGRESSIVE BEHAVIOR: Primary | ICD-10-CM

## 2024-06-18 DIAGNOSIS — N30.00 ACUTE CYSTITIS WITHOUT HEMATURIA: ICD-10-CM
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
## 2024-06-18 LAB
ACETAMINOPHEN LEVEL: <5 UG/ML (ref 15–30)
ALBUMIN SERPL-MCNC: 3 GM/DL (ref 3.4–5)
ALCOHOL SCREEN SERUM: <0.01 %WT/VOL
ALP BLD-CCNC: 111 IU/L (ref 40–128)
ALT SERPL-CCNC: 44 U/L (ref 10–40)
AMPHETAMINES: NEGATIVE
ANION GAP SERPL CALCULATED.3IONS-SCNC: 13 MMOL/L (ref 7–16)
AST SERPL-CCNC: 41 IU/L (ref 15–37)
BACTERIA: ABNORMAL /HPF
BARBITURATE SCREEN URINE: NEGATIVE
BASOPHILS ABSOLUTE: 0.1 K/CU MM
BASOPHILS RELATIVE PERCENT: 0.7 % (ref 0–1)
BENZODIAZEPINE SCREEN, URINE: NEGATIVE
BILIRUB SERPL-MCNC: 0.5 MG/DL (ref 0–1)
BILIRUBIN, URINE: NEGATIVE MG/DL
BLOOD, URINE: ABNORMAL
BUN SERPL-MCNC: 20 MG/DL (ref 6–23)
CALCIUM SERPL-MCNC: 8.4 MG/DL (ref 8.3–10.6)
CANNABINOID SCREEN URINE: NEGATIVE
CHLORIDE BLD-SCNC: 104 MMOL/L (ref 99–110)
CLARITY: ABNORMAL
CO2: 20 MMOL/L (ref 21–32)
COCAINE METABOLITE: NEGATIVE
COLOR: YELLOW
CREAT SERPL-MCNC: 1.4 MG/DL (ref 0.9–1.3)
DIFFERENTIAL TYPE: ABNORMAL
DOSE AMOUNT: ABNORMAL
DOSE AMOUNT: ABNORMAL
DOSE TIME: ABNORMAL
DOSE TIME: ABNORMAL
EOSINOPHILS ABSOLUTE: 0.2 K/CU MM
EOSINOPHILS RELATIVE PERCENT: 2.8 % (ref 0–3)
FENTANYL URINE: NEGATIVE
GFR, ESTIMATED: 50 ML/MIN/1.73M2
GLUCOSE SERPL-MCNC: 108 MG/DL (ref 70–99)
GLUCOSE URINE: NEGATIVE MG/DL
HCT VFR BLD CALC: 32.2 % (ref 42–52)
HEMOGLOBIN: 10.5 GM/DL (ref 13.5–18)
IMMATURE NEUTROPHIL %: 0.4 % (ref 0–0.43)
KETONES, URINE: NEGATIVE MG/DL
LEUKOCYTE ESTERASE, URINE: ABNORMAL
LYMPHOCYTES ABSOLUTE: 1.6 K/CU MM
LYMPHOCYTES RELATIVE PERCENT: 21 % (ref 24–44)
MCH RBC QN AUTO: 32.2 PG (ref 27–31)
MCHC RBC AUTO-ENTMCNC: 32.6 % (ref 32–36)
MCV RBC AUTO: 98.8 FL (ref 78–100)
MONOCYTES ABSOLUTE: 0.9 K/CU MM
MONOCYTES RELATIVE PERCENT: 11.9 % (ref 0–4)
NEUTROPHILS ABSOLUTE: 4.8 K/CU MM
NEUTROPHILS RELATIVE PERCENT: 63.2 % (ref 36–66)
NITRITE URINE, QUANTITATIVE: NEGATIVE
NUCLEATED RBC %: 0 %
OPIATES, URINE: NEGATIVE
OXYCODONE: NEGATIVE
PDW BLD-RTO: 15.1 % (ref 11.7–14.9)
PH, URINE: 7.5 (ref 5–8)
PLATELET # BLD: 140 K/CU MM (ref 140–440)
PMV BLD AUTO: 9.4 FL (ref 7.5–11.1)
POTASSIUM SERPL-SCNC: 4.2 MMOL/L (ref 3.5–5.1)
PROTEIN UA: ABNORMAL MG/DL
RBC # BLD: 3.26 M/CU MM (ref 4.6–6.2)
RBC URINE: 68 /HPF (ref 0–3)
SALICYLATE LEVEL: <0.3 MG/DL (ref 15–30)
SODIUM BLD-SCNC: 137 MMOL/L (ref 135–145)
SPECIFIC GRAVITY UA: 1.01 (ref 1–1.03)
TOTAL IMMATURE NEUTOROPHIL: 0.03 K/CU MM
TOTAL NUCLEATED RBC: 0 K/CU MM
TOTAL PROTEIN: 6.1 GM/DL (ref 6.4–8.2)
TRICHOMONAS: ABNORMAL /HPF
UROBILINOGEN, URINE: 1 MG/DL (ref 0.2–1)
WBC # BLD: 7.6 K/CU MM (ref 4–10.5)
WBC CLUMP: ABNORMAL /HPF
WBC UA: 686 /HPF (ref 0–2)

## 2024-06-18 PROCEDURE — G0480 DRUG TEST DEF 1-7 CLASSES: HCPCS

## 2024-06-18 PROCEDURE — 81001 URINALYSIS AUTO W/SCOPE: CPT

## 2024-06-18 PROCEDURE — 85025 COMPLETE CBC W/AUTO DIFF WBC: CPT

## 2024-06-18 PROCEDURE — 71045 X-RAY EXAM CHEST 1 VIEW: CPT

## 2024-06-18 PROCEDURE — 87086 URINE CULTURE/COLONY COUNT: CPT

## 2024-06-18 PROCEDURE — 72125 CT NECK SPINE W/O DYE: CPT

## 2024-06-18 PROCEDURE — 99285 EMERGENCY DEPT VISIT HI MDM: CPT

## 2024-06-18 PROCEDURE — 80053 COMPREHEN METABOLIC PANEL: CPT

## 2024-06-18 PROCEDURE — 6370000000 HC RX 637 (ALT 250 FOR IP): Performed by: EMERGENCY MEDICINE

## 2024-06-18 PROCEDURE — 73502 X-RAY EXAM HIP UNI 2-3 VIEWS: CPT

## 2024-06-18 PROCEDURE — 80307 DRUG TEST PRSMV CHEM ANLYZR: CPT

## 2024-06-18 PROCEDURE — 70450 CT HEAD/BRAIN W/O DYE: CPT

## 2024-06-18 RX ORDER — CEPHALEXIN 250 MG/1
250 CAPSULE ORAL 3 TIMES DAILY
Qty: 30 CAPSULE | Refills: 0 | Status: SHIPPED | OUTPATIENT
Start: 2024-06-18 | End: 2024-06-28

## 2024-06-18 RX ORDER — CEPHALEXIN 250 MG/1
500 CAPSULE ORAL ONCE
Status: COMPLETED | OUTPATIENT
Start: 2024-06-18 | End: 2024-06-18

## 2024-06-18 RX ORDER — ACETAMINOPHEN 325 MG/1
650 TABLET ORAL ONCE
Status: COMPLETED | OUTPATIENT
Start: 2024-06-18 | End: 2024-06-18

## 2024-06-18 RX ADMIN — ACETAMINOPHEN 650 MG: 325 TABLET ORAL at 07:23

## 2024-06-18 RX ADMIN — CEPHALEXIN 500 MG: 250 CAPSULE ORAL at 09:53

## 2024-06-18 ASSESSMENT — PAIN SCALES - GENERAL
PAINLEVEL_OUTOF10: 5
PAINLEVEL_OUTOF10: 0
PAINLEVEL_OUTOF10: 0
PAINLEVEL_OUTOF10: 5

## 2024-06-18 ASSESSMENT — LIFESTYLE VARIABLES
HOW MANY STANDARD DRINKS CONTAINING ALCOHOL DO YOU HAVE ON A TYPICAL DAY: PATIENT DOES NOT DRINK
HOW OFTEN DO YOU HAVE A DRINK CONTAINING ALCOHOL: NEVER

## 2024-06-18 ASSESSMENT — PAIN DESCRIPTION - ORIENTATION: ORIENTATION: MID

## 2024-06-18 ASSESSMENT — PAIN - FUNCTIONAL ASSESSMENT
PAIN_FUNCTIONAL_ASSESSMENT: 0-10

## 2024-06-18 ASSESSMENT — PAIN DESCRIPTION - LOCATION
LOCATION: BACK
LOCATION: BACK

## 2024-06-18 ASSESSMENT — PAIN DESCRIPTION - DESCRIPTORS: DESCRIPTORS: ACHING

## 2024-06-18 NOTE — ED NOTES
Asked for pain medication for his back notified that it burns when he urinates.   
[facility]ed [facility] to [facility]. Denies any questions. Waiting for transport.   
Fax [facility] slip to [facility]   
MD Rasta at bedside.  
[facility] report [phone] option 1 [facility] and fax [phone] transport after 10am  
Patient medically clear for inpatient psychiatry     Eugenio Garcia MD  06/18/24 0057    
Pt repeatedly yells for help and that he has to use the bathroom, then instantly says too late. This tech has helped him attempt to use the bathroom many times and pt has only been able to go once. Repeatedly assuring pt that I am at bedside to help him and remind him he is at the hospital.  
Report given to [facility]; care transferred.  
This nurse in to help pull pt up. Marci reports frequent urination and when using urinal states pt complains of pain. Dr Sharpe made aware.   
Transportation is here for the patient. Report has been called to [facility].   
Updated [facility] about patient.   
Updated daughter.   
Updated daughter.    Phone number: [phone]  
packet   Yes No   Sig: Take 1 packet by mouth daily   potassium chloride (KLOR-CON) 20 MEQ packet   Yes No   Sig: Take 20 mEq by mouth daily   tamsulosin (FLOMAX) 0.4 MG capsule   Yes No   Sig: Take 1 capsule by mouth nightly      Facility-Administered Medications: None          Additional Information  Isolation:      HIV Positive: unknown    Oxygen Use: No    Any Legal Issues (Current or Past): unknown    Does Patient have POA or Guardian: No    Current Living Situation:      Roommate Appropriate: Yes    Is this a special case or have any other considerations:  No  (pregnancy, autism, developmental disabled, etc.)    Does the patient have confirmed or suspected COVID-19 Symptoms: No  (if yes, test must be completed, if no test is not required)       Last COVID Lab SARS-CoV-2, NAAT (no units)   Date Value   05/07/2024 NOT DETECTED              Immunization status:   Immunization History   Administered Date(s) Administered    COVID-19, MODERNA, (2023-24 formula), (age 12y+), IM, 50mcg/0.5mL 03/12/2024    COVID-19, PFIZER PURPLE top, DILUTE for use, (age 12 y+), 30mcg/0.3mL 01/21/2021, 02/11/2021
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
## 2024-06-18 NOTE — ED PROVIDER NOTES
Patient was signed out to me that by Dr. Magdaleno,    83-year-old he has a history of vascular dementia, is at a facility, behavioral issues at baseline.  Verbally and physically was more sexually aggressive, was pink slipped by his provider there at [place].  He did have a fall and so trauma workup was also initiated, this has been negative.    Patient had been evaluated by psychiatry, pending transfer to [facility].    Notified by nurse at 8:15 AM that he is complaining of pain with urination and so I did also add a urinalysis at this time     Divine Sharpe MD  06/18/24 8069        Patient does have some bacteria in urine, will treat with keflex.      Divine Sharpe MD  06/18/24 8494    
   Special Requests NONE     Culture       Final Report >50,000 CFU/ml Mixed pathogens Multiple organisms isolated, no predominance. Culture indicates probable contamination. Please review colony count and clinical indications to determine if a repeat culture is necessary. No further workup to be done.     CBC with Auto Differential   Result Value Ref Range    WBC 7.6 4.0 - 10.5 K/CU MM    RBC 3.26 (L) 4.6 - 6.2 M/CU MM    Hemoglobin 10.5 (L) 13.5 - 18.0 GM/DL    Hematocrit 32.2 (L) 42 - 52 %    MCV 98.8 78 - 100 FL    MCH 32.2 (H) 27 - 31 PG    MCHC 32.6 32.0 - 36.0 %    RDW 15.1 (H) 11.7 - 14.9 %    Platelets 140 140 - 440 K/CU MM    MPV 9.4 7.5 - 11.1 FL    Differential Type AUTOMATED DIFFERENTIAL     Neutrophils % 63.2 36 - 66 %    Lymphocytes % 21.0 (L) 24 - 44 %    Monocytes % 11.9 (H) 0 - 4 %    Eosinophils % 2.8 0 - 3 %    Basophils % 0.7 0 - 1 %    Neutrophils Absolute 4.8 K/CU MM    Lymphocytes Absolute 1.6 K/CU MM    Monocytes Absolute 0.9 K/CU MM    Eosinophils Absolute 0.2 K/CU MM    Basophils Absolute 0.1 K/CU MM    Nucleated RBC % 0.0 %    Total Nucleated RBC 0.0 K/CU MM    Total Immature Neutrophil 0.03 K/CU MM    Immature Neutrophil % 0.4 0 - 0.43 %   Comprehensive Metabolic Panel w/ Reflex to MG   Result Value Ref Range    Sodium 137 135 - 145 MMOL/L    Potassium 4.2 3.5 - 5.1 MMOL/L    Chloride 104 99 - 110 mMol/L    CO2 20 (L) 21 - 32 MMOL/L    Anion Gap 13 7 - 16    Glucose 108 (H) 70 - 99 MG/DL    BUN 20 6 - 23 MG/DL    Creatinine 1.4 (H) 0.9 - 1.3 MG/DL    Est, Glom Filt Rate 50 (L) >60 mL/min/1.73m2    Calcium 8.4 8.3 - 10.6 MG/DL    Total Protein 6.1 (L) 6.4 - 8.2 GM/DL    Albumin 3.0 (L) 3.4 - 5.0 GM/DL    Total Bilirubin 0.5 0.0 - 1.0 MG/DL    Alkaline Phosphatase 111 40 - 128 IU/L    ALT 44 (H) 10 - 40 U/L    AST 41 (H) 15 - 37 IU/L   Ethanol   Result Value Ref Range    Alcohol Scrn <0.01 <0.01 %WT/VOL   Acetaminophen level   Result Value Ref Range    Acetaminophen Level <5.0 (L) 15 - 30

## 2024-06-19 LAB
CULTURE: NORMAL
Lab: NORMAL
SPECIMEN: NORMAL

## 2024-06-25 ENCOUNTER — HOSPITAL ENCOUNTER (OUTPATIENT)
Age: 84
Setting detail: SPECIMEN
Discharge: HOME | End: 2024-06-25
Payer: MEDICARE

## 2024-06-25 PROCEDURE — 87086 URINE CULTURE/COLONY COUNT: CPT

## 2024-06-25 PROCEDURE — 87077 CULTURE AEROBIC IDENTIFY: CPT

## 2024-06-25 PROCEDURE — 87186 SC STD MICRODIL/AGAR DIL: CPT

## 2024-06-25 PROCEDURE — 81001 URINALYSIS AUTO W/SCOPE: CPT

## 2024-06-26 ENCOUNTER — HOSPITAL ENCOUNTER (OUTPATIENT)
Age: 84
Setting detail: SPECIMEN
Discharge: HOME | End: 2024-06-26
Payer: MEDICARE

## 2024-06-26 LAB
ALBUMIN SERPL-MCNC: 2.9 GM/DL (ref 3.4–5)
ALP BLD-CCNC: 108 IU/L (ref 40–129)
ALT SERPL-CCNC: 30 U/L (ref 10–40)
ANION GAP SERPL CALCULATED.3IONS-SCNC: 12 MMOL/L (ref 7–16)
AST SERPL-CCNC: 25 IU/L (ref 15–37)
BACTERIA: NEGATIVE /HPF
BILIRUB SERPL-MCNC: 0.7 MG/DL (ref 0–1)
BILIRUBIN, URINE: NEGATIVE MG/DL
BLOOD, URINE: ABNORMAL
BUN SERPL-MCNC: 19 MG/DL (ref 6–23)
CALCIUM OXALATE CRYSTALS: ABNORMAL /HPF
CALCIUM SERPL-MCNC: 8.3 MG/DL (ref 8.3–10.6)
CHLORIDE BLD-SCNC: 105 MMOL/L (ref 99–110)
CHOLEST SERPL-MCNC: 90 MG/DL
CLARITY: CLEAR
CO2: 24 MMOL/L (ref 21–32)
COLOR: YELLOW
CREAT SERPL-MCNC: 1.4 MG/DL (ref 0.9–1.3)
ESTIMATED AVERAGE GLUCOSE: 128 MG/DL
GFR, ESTIMATED: 50 ML/MIN/1.73M2
GLUCOSE SERPL-MCNC: 93 MG/DL (ref 70–99)
GLUCOSE URINE: NEGATIVE MG/DL
HBA1C MFR BLD: 6.1 % (ref 4.2–6.3)
HCT VFR BLD CALC: 32.3 % (ref 42–52)
HDLC SERPL-MCNC: 34 MG/DL
HEMOGLOBIN: 10.4 GM/DL (ref 13.5–18)
HYALINE CASTS: 2 /LPF
KETONES, URINE: NEGATIVE MG/DL
LDLC SERPL CALC-MCNC: 38 MG/DL
LEUKOCYTE ESTERASE, URINE: ABNORMAL
MCH RBC QN AUTO: 32.4 PG (ref 27–31)
MCHC RBC AUTO-ENTMCNC: 32.2 % (ref 32–36)
MCV RBC AUTO: 100.6 FL (ref 78–100)
MUCUS: ABNORMAL HPF
NITRITE URINE, QUANTITATIVE: NEGATIVE
PDW BLD-RTO: 16.1 % (ref 11.7–14.9)
PH, URINE: 5.5 (ref 5–8)
PLATELET # BLD: 200 K/CU MM (ref 140–440)
PMV BLD AUTO: 9.6 FL (ref 7.5–11.1)
POTASSIUM SERPL-SCNC: 3.9 MMOL/L (ref 3.5–5.1)
PROSTATE SPECIFIC ANTIGEN: 267.4 NG/ML (ref 0–4)
PROTEIN UA: ABNORMAL MG/DL
RBC # BLD: 3.21 M/CU MM (ref 4.6–6.2)
RBC URINE: 17 /HPF (ref 0–3)
SODIUM BLD-SCNC: 141 MMOL/L (ref 135–145)
SPECIFIC GRAVITY UA: 1.02 (ref 1–1.03)
SQUAMOUS EPITHELIAL: 1 /HPF
TOTAL PROTEIN: 5.2 GM/DL (ref 6.4–8.2)
TRICHOMONAS: ABNORMAL /HPF
TRIGL SERPL-MCNC: 90 MG/DL
UROBILINOGEN, URINE: 1 MG/DL (ref 0.2–1)
WBC # BLD: 5.5 K/CU MM (ref 4–10.5)
WBC CLUMP: ABNORMAL /HPF
WBC UA: 88 /HPF (ref 0–2)

## 2024-06-26 PROCEDURE — 83036 HEMOGLOBIN GLYCOSYLATED A1C: CPT

## 2024-06-26 PROCEDURE — G0103 PSA SCREENING: HCPCS

## 2024-06-26 PROCEDURE — 85027 COMPLETE CBC AUTOMATED: CPT

## 2024-06-26 PROCEDURE — 80053 COMPREHEN METABOLIC PANEL: CPT

## 2024-06-26 PROCEDURE — 36415 COLL VENOUS BLD VENIPUNCTURE: CPT

## 2024-06-26 PROCEDURE — 80061 LIPID PANEL: CPT

## 2024-06-29 LAB
CULTURE: ABNORMAL
Lab: ABNORMAL
SPECIMEN: ABNORMAL

## 2024-07-11 ENCOUNTER — APPOINTMENT (OUTPATIENT)
Dept: CT IMAGING | Age: 84
End: 2024-07-11
Payer: MEDICARE

## 2024-07-11 ENCOUNTER — HOSPITAL ENCOUNTER (EMERGENCY)
Age: 84
Discharge: HOME | End: 2024-07-12
Attending: EMERGENCY MEDICINE
Payer: MEDICARE

## 2024-07-11 VITALS
RESPIRATION RATE: 18 BRPM | BODY MASS INDEX: 30.48 KG/M2 | TEMPERATURE: 98.2 F | HEART RATE: 90 BPM | WEIGHT: 230 LBS | HEIGHT: 73 IN | SYSTOLIC BLOOD PRESSURE: 121 MMHG | DIASTOLIC BLOOD PRESSURE: 93 MMHG | OXYGEN SATURATION: 96 %

## 2024-07-11 DIAGNOSIS — E04.1 THYROID NODULE: ICD-10-CM

## 2024-07-11 DIAGNOSIS — S09.90XA CLOSED HEAD INJURY, INITIAL ENCOUNTER: Primary | ICD-10-CM

## 2024-07-11 PROCEDURE — 70450 CT HEAD/BRAIN W/O DYE: CPT

## 2024-07-11 PROCEDURE — 72125 CT NECK SPINE W/O DYE: CPT

## 2024-07-11 PROCEDURE — 99284 EMERGENCY DEPT VISIT MOD MDM: CPT

## 2024-07-12 NOTE — ED PROVIDER NOTES
consolidation. No pleural effusion or pneumothorax. Normal cardiomediastinal silhouette. Intact osseous structures.     Pulmonary vascular prominence without tori edema or consolidation. Electronically signed by SHAMAR LARIOS      MDM:  CC/HPI Summary, DDx, ED Course, and Reassessment:   Pt presents as above.  Emergent conditions considered.  Presentation prompted initial imaging as above.  Patient is expedited to CT.  CT head and cervical spine are negative for acute fracture or bleed.  C-spine is cleared by myself.  Patient on my recheck is again without any symptoms now 1 hour in the ED course.  Heart rate has been variable all around 100 with A-fib on the monitor.  Blood pressure has been stable and patient's been saturating well on room air.  No other trauma noted.  Patient will be discharged back to his nursing home.    History from : Patient and EMS    Limitations to history : Altered Mental Status (baseline mental status)    Patient was given the following medications:  Medications - No data to display    Independent Imaging Interpretation by me: CT head without ICH per my read    Chronic conditions affecting care: AFIB on Eliquis    Discussion with Other Profesionals : None    Social Determinants : None    Records Reviewed : None    Disposition Considerations (tests considered but not done, Shared Decision Making, Pt Expectation of Test or Tx.):   Appropriate for outpatient management      I discussed specific signs and symptoms and when to return to the emergency department as well as need for close outpatient follow-up.  Questions sought and answered with the patient. They voice understanding and agree with plan.    I am the Primary Clinician of Record.            Clinical Impression:  1. Closed head injury, initial encounter    2. Thyroid nodule      Disposition referral (if applicable):  Your Primary Care Physician    Schedule an appointment as soon as possible for a visit   PLEASE CALL TO SCHEDULE AN